# Patient Record
Sex: FEMALE | Race: WHITE | NOT HISPANIC OR LATINO | Employment: OTHER | URBAN - METROPOLITAN AREA
[De-identification: names, ages, dates, MRNs, and addresses within clinical notes are randomized per-mention and may not be internally consistent; named-entity substitution may affect disease eponyms.]

---

## 2017-01-09 ENCOUNTER — OFFICE VISIT (OUTPATIENT)
Dept: CARDIOLOGY | Facility: CLINIC | Age: 77
End: 2017-01-09

## 2017-01-09 VITALS
SYSTOLIC BLOOD PRESSURE: 132 MMHG | DIASTOLIC BLOOD PRESSURE: 74 MMHG | HEART RATE: 65 BPM | WEIGHT: 179 LBS | BODY MASS INDEX: 30.02 KG/M2

## 2017-01-09 DIAGNOSIS — R60.0 LOCALIZED EDEMA: ICD-10-CM

## 2017-01-09 DIAGNOSIS — I10 ESSENTIAL HYPERTENSION: Primary | ICD-10-CM

## 2017-01-09 DIAGNOSIS — F32.89 OTHER DEPRESSION: ICD-10-CM

## 2017-01-09 DIAGNOSIS — R06.02 SHORTNESS OF BREATH ON EXERTION: ICD-10-CM

## 2017-01-09 PROCEDURE — 99204 OFFICE O/P NEW MOD 45 MIN: CPT | Performed by: INTERNAL MEDICINE

## 2017-01-09 PROCEDURE — 93000 ELECTROCARDIOGRAM COMPLETE: CPT | Performed by: INTERNAL MEDICINE

## 2017-01-09 RX ORDER — ASPIRIN 81 MG/1
81 TABLET ORAL DAILY
COMMUNITY

## 2017-01-09 NOTE — PROGRESS NOTES
[facility]  Cardiology Consult Note  .  Patient Identification:  Name: Estela Joshua  Age: 76 y.o.  Sex: female  :  1940  MRN: [id number]       2017      Requesting Physician:Dr Sheldon  Reason for Consultation: shortness of breath      Chief Complaint   Patient presents with   • Hypertension   • Shortness of Breath     HPI     She has known CAD with occluded RCA [facility] with left to right collaterals. Normal EF in the past    Now she has progressively worsening dyspnea on exertion for the last 6 months. Walking less than a block makes her short of breath. No pnd or orthopnea. No edema of the legs.  Occasionally she gets chest tightness on walking and going up/down steps. The tightess doesn't last long. No heart palpitations or syncope.    She does get bronchitis and quit smoking 30 years ago.    Past Medical History:  Past Medical History   Diagnosis Date   • Depression    • Glaucoma    • Hyperglycemia    • Hyperlipidemia    • Hypertension    • Osteoarthritis      Past Surgical History:  Past Surgical History   Procedure Laterality Date   • Appendectomy     • Cardiac surgery     • Laparoscopic tubal ligation     • Cataract extraction        Home Meds:    (Not in a hospital admission)  Current Meds:     Current Outpatient Prescriptions:   •  amLODIPine (NORVASC) 5 MG tablet, TAKE 1 TABLET BY MOUTH TWICE DAILY, Disp: 180 tablet, Rfl: 0  •  aspirin 81 MG EC tablet, Take 81 mg by mouth Daily., Disp: , Rfl:   •  atorvastatin (LIPITOR) 40 MG tablet, TAKE 1 TABLET BY MOUTH EVERY DAY, Disp: 90 tablet, Rfl: 0  •  BESIVANCE 0.6 % suspension ophthalmic suspension, SHAKE LQ AND INT 1 GTT IN OD BID, Disp: , Rfl: 1  •  diclofenac (VOLTAREN) 75 MG EC tablet, TAKE 1 TABLET BY MOUTH TWICE DAILY WITH FOOD, Disp: 180 tablet, Rfl: 0  •  fluticasone (FLONASE) 50 MCG/ACT nasal spray, into each nostril., Disp: , Rfl:   •  FLUZONE HIGH-DOSE 0.5 ML suspension prefilled syringe  injection, ADM 0.5ML IM UTD, Disp: , Rfl: 0  •  furosemide (LASIX) 20 MG tablet, TAKE 1 TABLET BY MOUTH EVERY MORNING, Disp: 90 tablet, Rfl: 0  •  Glucosamine HCl (GLUCOSAMINE PO), Take  by mouth., Disp: , Rfl:   •  LOTEMAX 0.5 % gel ophthalmic gel, INT 1 DROP IN THE OD BID, Disp: , Rfl: 1  •  pantoprazole (PROTONIX) 40 MG EC tablet, TAKE 1 TABLET BY MOUTH EVERY DAY, Disp: 90 tablet, Rfl: 5  •  PROLENSA 0.07 % solution, INT 1 DROP IN THE OD QD, Disp: , Rfl: 1  •  sertraline (ZOLOFT) 25 MG tablet, TAKE 1 TABLET BY MOUTH EVERY DAY, Disp: 90 tablet, Rfl: 0  •  vitamin D (ERGOCALCIFEROL) 71039 UNITS capsule capsule, Take  by mouth., Disp: , Rfl:   Allergies:  No Known Allergies  Social History:   Social History   Substance Use Topics   • Smoking status: Former Smoker   • Smokeless tobacco: Not on file   • Alcohol use Yes      Family History:  Family History   Problem Relation Age of Onset   • Alzheimer's disease Mother    • Stroke Mother    • Heart failure Father       Review of Systems   Constitution: Positive for malaise/fatigue. Negative for chills and fever.   HENT: Negative for headaches.    Eyes: Negative for blurred vision and double vision.   Cardiovascular: Positive for irregular heartbeat and leg swelling. Negative for chest pain and palpitations.   Respiratory: Positive for shortness of breath and snoring.    Skin: Negative for color change.   Musculoskeletal: Positive for arthritis.   Gastrointestinal: Negative for abdominal pain, nausea and vomiting.   Neurological: Positive for light-headedness. Negative for dizziness and numbness.   Psychiatric/Behavioral: Depression: meds.      Al the other Ros are stable  Objective:  Visit Vitals   • /74   • Pulse 65   • Wt 179 lb (81.2 kg)   • BMI 30.02 kg/m2     Exam:  Physical Exam    General: No acute distress, well developed, well nourished    Skin: Warm and dry, no diaphoresis noted; well hydrated; no rash; no pallor or cyanosis   HEENT: Normal Pupills; no  conjunctiva erythema; external ear and nose normal; oral mucosa moist, no xanthelasma, lips not cyanotic   Neck: Supple; no carotid bruits; no JVP; thyroid not enlarged. Trahea mid line    Heart: S1S2 regular rate and rhythm; no murmurs, no rubs or gallops are auscultated.   Chest: Respirations regular, unlabored at rest, bilateral breath sounds have good air entry throughout all lung fields; no crackles, rubs or wheezes auscultated.     Abdomen: Soft, non-tender, non-distended, positive bowel sounds, no organomegaly   Extremities: Bilateral lower extremities have no pre-tibial pitting edema; Femoral pulses are palpable   Musculoskeletal:  Moves all extremities well; no deformities; no tenderness; no clubbing of the fingers   Neurological/  Psychological:  Alert and oriented x 3; no new  motor deficits; speech and behavior appropriate; normal mood and affect    Rest of the exam is stable       ECG 12 Lead  Date/Time: 1/9/2017 1:06 PM  Performed by: QUIN SALVADOR  Authorized by: QUIN SALVADOR   Previous ECG: no previous ECG available  Rhythm: sinus rhythm  Rate: normal  QRS axis: normal  Comments: Nonspecific ST abnormality            Assessment:    Problem List Items Addressed This Visit        Cardiovascular and Mediastinum    Hypertension - Primary       Respiratory    Shortness of breath on exertion       Other    Depression    Edema          Recommendations:    Overall her shortness of breath appears multifactorial. It could be ischemia equivalent as well. I will get an Echo and a cardiolyte stress test and further recommondations will follow. I will check her BNP        Quin Salvador MD  1/9/2017, 1:06 PM

## 2017-01-09 NOTE — LETTER
2017     Robert Miner MD  [address] [facility] [address]    Patient: Estela Joshua   YOB: 1940   Date of Visit: 2017       Dear Dr. Kristofer MD:    Thank you for referring Estela Joshua to me for evaluation. Below are the relevant portions of my assessment and plan of care.    If you have questions, please do not hesitate to call me. I look forward to following Estela along with you.         Sincerely,        Pramod Salvador MD        CC: No Recipients  Pramod Salvador MD  2017  1:09 PM  Sign at close encounter  [facility]  Cardiology Consult Note  .  Patient Identification:  Name: Estela Joshua  Age: 76 y.o.  Sex: female  :  1940  MRN: [id number]       2017      Requesting Physician:Dr Sheldon  Reason for Consultation: shortness of breath      Chief Complaint   Patient presents with   • Hypertension   • Shortness of Breath     HPI     She has known CAD with occluded RCA per cath report in  at [facility] with left to right collaterals. Normal EF in the past    Now she has progressively worsening dyspnea on exertion for the last 6 months. Walking less than a block makes her short of breath. No pnd or orthopnea. No edema of the legs.  Occasionally she gets chest tightness on walking and going up/down steps. The tightess doesn't last long. No heart palpitations or syncope.    She does get bronchitis and quit smoking 30 years ago.    Past Medical History:  Past Medical History   Diagnosis Date   • Depression    • Glaucoma    • Hyperglycemia    • Hyperlipidemia    • Hypertension    • Osteoarthritis      Past Surgical History:  Past Surgical History   Procedure Laterality Date   • Appendectomy     • Cardiac surgery     • Laparoscopic tubal ligation     • Cataract extraction        Home Meds:    (Not in a hospital admission)  Current Meds:     Current Outpatient Prescriptions:   •  amLODIPine (NORVASC) 5 MG tablet, TAKE 1 TABLET BY MOUTH  TWICE DAILY, Disp: 180 tablet, Rfl: 0  •  aspirin 81 MG EC tablet, Take 81 mg by mouth Daily., Disp: , Rfl:   •  atorvastatin (LIPITOR) 40 MG tablet, TAKE 1 TABLET BY MOUTH EVERY DAY, Disp: 90 tablet, Rfl: 0  •  BESIVANCE 0.6 % suspension ophthalmic suspension, SHAKE LQ AND INT 1 GTT IN OD BID, Disp: , Rfl: 1  •  diclofenac (VOLTAREN) 75 MG EC tablet, TAKE 1 TABLET BY MOUTH TWICE DAILY WITH FOOD, Disp: 180 tablet, Rfl: 0  •  fluticasone (FLONASE) 50 MCG/ACT nasal spray, into each nostril., Disp: , Rfl:   •  FLUZONE HIGH-DOSE 0.5 ML suspension prefilled syringe injection, ADM 0.5ML IM UTD, Disp: , Rfl: 0  •  furosemide (LASIX) 20 MG tablet, TAKE 1 TABLET BY MOUTH EVERY MORNING, Disp: 90 tablet, Rfl: 0  •  Glucosamine HCl (GLUCOSAMINE PO), Take  by mouth., Disp: , Rfl:   •  LOTEMAX 0.5 % gel ophthalmic gel, INT 1 DROP IN THE OD BID, Disp: , Rfl: 1  •  pantoprazole (PROTONIX) 40 MG EC tablet, TAKE 1 TABLET BY MOUTH EVERY DAY, Disp: 90 tablet, Rfl: 5  •  PROLENSA 0.07 % solution, INT 1 DROP IN THE OD QD, Disp: , Rfl: 1  •  sertraline (ZOLOFT) 25 MG tablet, TAKE 1 TABLET BY MOUTH EVERY DAY, Disp: 90 tablet, Rfl: 0  •  vitamin D (ERGOCALCIFEROL) 28075 UNITS capsule capsule, Take  by mouth., Disp: , Rfl:   Allergies:  No Known Allergies  Social History:   Social History   Substance Use Topics   • Smoking status: Former Smoker   • Smokeless tobacco: Not on file   • Alcohol use Yes      Family History:  Family History   Problem Relation Age of Onset   • Alzheimer's disease Mother    • Stroke Mother    • Heart failure Father       Review of Systems   Constitution: Positive for malaise/fatigue. Negative for chills and fever.   HENT: Negative for headaches.    Eyes: Negative for blurred vision and double vision.   Cardiovascular: Positive for irregular heartbeat and leg swelling. Negative for chest pain and palpitations.   Respiratory: Positive for shortness of breath and snoring.    Skin: Negative for color change.    Musculoskeletal: Positive for arthritis.   Gastrointestinal: Negative for abdominal pain, nausea and vomiting.   Neurological: Positive for light-headedness. Negative for dizziness and numbness.   Psychiatric/Behavioral: Depression: meds.      Al the other Ros are stable  Objective:  Visit Vitals   • /74   • Pulse 65   • Wt 179 lb (81.2 kg)   • BMI 30.02 kg/m2     Exam:  Physical Exam    General: No acute distress, well developed, well nourished    Skin: Warm and dry, no diaphoresis noted; well hydrated; no rash; no pallor or cyanosis   HEENT: Normal Pupills; no conjunctiva erythema; external ear and nose normal; oral mucosa moist, no xanthelasma, lips not cyanotic   Neck: Supple; no carotid bruits; no JVP; thyroid not enlarged. Trahea mid line    Heart: S1S2 regular rate and rhythm; no murmurs, no rubs or gallops are auscultated.   Chest: Respirations regular, unlabored at rest, bilateral breath sounds have good air entry throughout all lung fields; no crackles, rubs or wheezes auscultated.     Abdomen: Soft, non-tender, non-distended, positive bowel sounds, no organomegaly   Extremities: Bilateral lower extremities have no pre-tibial pitting edema; Femoral pulses are palpable   Musculoskeletal:  Moves all extremities well; no deformities; no tenderness; no clubbing of the fingers   Neurological/  Psychological:  Alert and oriented x 3; no new  motor deficits; speech and behavior appropriate; normal mood and affect    Rest of the exam is stable       ECG 12 Lead  Date/Time: 1/9/2017 1:06 PM  Performed by: QUIN DA SILVA  Authorized by: QUIN DA SILVA   Previous ECG: no previous ECG available  Rhythm: sinus rhythm  Rate: normal  QRS axis: normal  Comments: Nonspecific ST abnormality            Assessment:    Problem List Items Addressed This Visit        Cardiovascular and Mediastinum    Hypertension - Primary       Respiratory    Shortness of breath on exertion       Other    Depression     Edema          Recommendations:    Overall her shortness of breath appears multifactorial. It could be ischemia equivalent as well. I will get an Echo and a cardiolyte stress test and further recommondations will follow. I will check her BNP        Pramod Salvador MD  1/9/2017, 1:06 PM

## 2017-01-09 NOTE — MR AVS SNAPSHOT
[facility]  [phone]                    Estela Joshua   1/9/2017 12:30 PM   Office Visit    Dept Phone:  [phone]   Encounter #:  [id number]    Provider:  Pramod Salvador MD   Department:  [facility]                Your Full Care Plan              Today's Medication Changes          These changes are accurate as of: 1/9/17  1:13 PM.  If you have any questions, ask your nurse or doctor.               Medication(s)that have changed:     sertraline 25 MG tablet   Commonly known as:  ZOLOFT   TAKE 1 TABLET BY MOUTH EVERY DAY   What changed:  Another medication with the same name was removed. Continue taking this medication, and follow the directions you see here.   Changed by:  Robert Miner MD                  Your Updated Medication List          This list is accurate as of: 1/9/17  1:13 PM.  Always use your most recent med list.                amLODIPine 5 MG tablet   Commonly known as:  NORVASC   TAKE 1 TABLET BY MOUTH TWICE DAILY       aspirin 81 MG EC tablet       atorvastatin 40 MG tablet   Commonly known as:  LIPITOR   TAKE 1 TABLET BY MOUTH EVERY DAY       BESIVANCE 0.6 % suspension ophthalmic suspension   Generic drug:  besifloxacin       diclofenac 75 MG EC tablet   Commonly known as:  VOLTAREN   TAKE 1 TABLET BY MOUTH TWICE DAILY WITH FOOD       fluticasone 50 MCG/ACT nasal spray   Commonly known as:  FLONASE       FLUZONE HIGH-DOSE 0.5 ML suspension prefilled syringe injection   Generic drug:  influenza vac split high-dose       furosemide 20 MG tablet   Commonly known as:  LASIX   TAKE 1 TABLET BY MOUTH EVERY MORNING       GLUCOSAMINE PO       LOTEMAX 0.5 % gel ophthalmic gel   Generic drug:  loteprednol etabonate       pantoprazole 40 MG EC tablet   Commonly known as:  PROTONIX   TAKE 1 TABLET BY MOUTH EVERY DAY       PROLENSA 0.07 % solution   Generic drug:  Bromfenac Sodium       sertraline 25  MG tablet   Commonly known as:  ZOLOFT   TAKE 1 TABLET BY MOUTH EVERY DAY       vitamin D 00686 UNITS capsule capsule   Commonly known as:  ERGOCALCIFEROL               We Performed the Following     ECG 12 Lead     proBNP       You Were Diagnosed With        Codes Comments    Essential hypertension    -  Primary ICD-10-CM: I10  ICD-9-CM: 401.9     Shortness of breath on exertion     ICD-10-CM: R06.02  ICD-9-CM: 786.05     Other depression     ICD-10-CM: F32.89     Localized edema     ICD-10-CM: R60.0  ICD-9-CM: 782.3       Instructions     None    Patient Instructions History      Upcoming Appointments     Visit Type Date Time Department    NEW PATIENT 2017 12:30 PM [facility]    [facility]    FOLLOW UP 2017  9:30 AM [facility]      [employer] Signup     [facility] [employer] allows you to send messages to your doctor, view your test results, renew your prescriptions, schedule appointments, and more. To sign up, go to [employer]aze and click on the Sign Up Now link in the New User? box. Enter your [employer] Activation Code exactly as it appears below along with the last four digits of your Social Security Number and your Date of Birth () to complete the sign-up process. If you do not sign up before the expiration date, you must request a new code.    [employer] Activation Code: [email or web address]  Expires: 2017  1:13 PM    If you have questions, you can email [email or web address] or call [phone] to talk to our [employer] staff. Remember, [employer] is NOT to be used for urgent needs. For medical emergencies, dial 911.               Other Info from Your Visit           Your Appointments     2017  8:30 AM EDT   [facility] with [facility] (--)    [address] [facility] [address]   [phone]            2017  9:30 AM EDT   Follow Up with Robert Miner MD    [facility] (--)    [address] [facility] [address] [phone]           Arrive 15 minutes prior to appointment.              Allergies     No Known Allergies      Reason for Visit     Hypertension     Shortness of Breath           Vital Signs     Blood Pressure Pulse Weight Body Mass Index Smoking Status       132/74 65 179 lb (81.2 kg) 30.02 kg/m2 Former Smoker       Problems and Diagnoses Noted     Depression    Accumulation of fluid in tissues    High blood pressure    Shortness of breath on exertion      Results     ECG 12 Lead

## 2017-01-10 ENCOUNTER — HOSPITAL ENCOUNTER (OUTPATIENT)
Dept: CARDIOLOGY | Facility: HOSPITAL | Age: 77
Discharge: HOME | End: 2017-01-10
Attending: INTERNAL MEDICINE

## 2017-01-10 ENCOUNTER — HOSPITAL ENCOUNTER (OUTPATIENT)
Dept: CARDIOLOGY | Facility: HOSPITAL | Age: 77
Discharge: HOME | End: 2017-01-10
Attending: INTERNAL MEDICINE | Admitting: INTERNAL MEDICINE

## 2017-01-10 ENCOUNTER — HOSPITAL ENCOUNTER (OUTPATIENT)
Dept: CARDIOLOGY | Facility: HOSPITAL | Age: 77
Discharge: HOME | End: 2017-01-10

## 2017-01-10 VITALS — HEART RATE: 56 BPM | DIASTOLIC BLOOD PRESSURE: 68 MMHG | SYSTOLIC BLOOD PRESSURE: 108 MMHG

## 2017-01-10 VITALS — BODY MASS INDEX: 30.56 KG/M2 | HEIGHT: 64 IN | WEIGHT: 179 LBS

## 2017-01-10 DIAGNOSIS — I10 ESSENTIAL HYPERTENSION: ICD-10-CM

## 2017-01-10 DIAGNOSIS — R06.02 SHORTNESS OF BREATH ON EXERTION: ICD-10-CM

## 2017-01-10 DIAGNOSIS — F32.89 OTHER DEPRESSION: ICD-10-CM

## 2017-01-10 DIAGNOSIS — R60.0 LOCALIZED EDEMA: ICD-10-CM

## 2017-01-10 LAB — NT-PROBNP SERPL-MCNC: 159.1 PG/ML (ref 0–1800)

## 2017-01-10 PROCEDURE — 93018 CV STRESS TEST I&R ONLY: CPT | Performed by: INTERNAL MEDICINE

## 2017-01-10 PROCEDURE — 93016 CV STRESS TEST SUPVJ ONLY: CPT | Performed by: INTERNAL MEDICINE

## 2017-01-10 PROCEDURE — 78452 HT MUSCLE IMAGE SPECT MULT: CPT

## 2017-01-10 PROCEDURE — 0399T HC MYOCARDL STRAIN IMAG QUAN ASSMT PER SESS: CPT

## 2017-01-10 PROCEDURE — 25010000002 REGADENOSON 0.4 MG/5ML SOLUTION: Performed by: INTERNAL MEDICINE

## 2017-01-10 PROCEDURE — 93306 TTE W/DOPPLER COMPLETE: CPT

## 2017-01-10 PROCEDURE — A9500 TC99M SESTAMIBI: HCPCS | Performed by: INTERNAL MEDICINE

## 2017-01-10 PROCEDURE — 93306 TTE W/DOPPLER COMPLETE: CPT | Performed by: INTERNAL MEDICINE

## 2017-01-10 PROCEDURE — 36415 COLL VENOUS BLD VENIPUNCTURE: CPT | Performed by: INTERNAL MEDICINE

## 2017-01-10 PROCEDURE — 0 TECHNETIUM SESTAMIBI: Performed by: INTERNAL MEDICINE

## 2017-01-10 PROCEDURE — 78452 HT MUSCLE IMAGE SPECT MULT: CPT | Performed by: INTERNAL MEDICINE

## 2017-01-10 PROCEDURE — 93017 CV STRESS TEST TRACING ONLY: CPT

## 2017-01-10 PROCEDURE — 83880 ASSAY OF NATRIURETIC PEPTIDE: CPT | Performed by: INTERNAL MEDICINE

## 2017-01-10 RX ADMIN — Medication 1 DOSE: at 08:50

## 2017-01-10 RX ADMIN — Medication 1 DOSE: at 10:47

## 2017-01-10 RX ADMIN — REGADENOSON 0.4 MG: 0.08 INJECTION, SOLUTION INTRAVENOUS at 10:48

## 2017-01-11 LAB
BH CV STRESS BP STAGE 1: NORMAL
BH CV STRESS DURATION MIN STAGE 1: 3
BH CV STRESS DURATION SEC STAGE 1: 0
BH CV STRESS GRADE STAGE 1: 10
BH CV STRESS HR STAGE 1: 111
BH CV STRESS METS STAGE 1: 4.6
BH CV STRESS PROTOCOL 1: NORMAL
BH CV STRESS PROTOCOL 2 COMMENTS STAGE 1: NORMAL
BH CV STRESS PROTOCOL 2 DOSE REGADENOSON STAGE 1: 0.4
BH CV STRESS PROTOCOL 2 DURATION MIN STAGE 1: 0
BH CV STRESS PROTOCOL 2 DURATION SEC STAGE 1: 15
BH CV STRESS PROTOCOL 2 HR STAGE 1: 114
BH CV STRESS PROTOCOL 2 STAGE 1: 1
BH CV STRESS PROTOCOL 2: NORMAL
BH CV STRESS RECOVERY BP: NORMAL MMHG
BH CV STRESS RECOVERY HR: 76 BPM
BH CV STRESS SPEED STAGE 1: 1.7
BH CV STRESS STAGE 1: 1
LV EF NUC BP: 77 %
MAXIMAL PREDICTED HEART RATE: 144 BPM
PERCENT MAX PREDICTED HR: 79.17 %
STRESS BASELINE BP: NORMAL MMHG
STRESS BASELINE HR: 60 BPM
STRESS PERCENT HR: 93 %
STRESS POST ESTIMATED WORKLOAD: 5 METS
STRESS POST EXERCISE DUR MIN: 6 MIN
STRESS POST EXERCISE DUR SEC: 0 SEC
STRESS POST PEAK BP: NORMAL MMHG
STRESS POST PEAK HR: 114 BPM
STRESS TARGET HR: 122 BPM

## 2017-01-12 ENCOUNTER — TELEPHONE (OUTPATIENT)
Dept: CARDIOLOGY | Facility: HOSPITAL | Age: 77
End: 2017-01-12

## 2017-01-12 NOTE — TELEPHONE ENCOUNTER
Left VM asking patient to call back regarding abnormal ECG on Cardiolite stress test per Dr. Salvador and to keep FU OV scheduled for 1/24/17 at 3:00.

## 2017-01-16 LAB
BH CV ECHO MEAS - ACS: 2 CM
BH CV ECHO MEAS - AI DEC SLOPE: 158 CM/SEC^2
BH CV ECHO MEAS - AI MAX PG: 37.5 MMHG
BH CV ECHO MEAS - AI MAX VEL: 306 CM/SEC
BH CV ECHO MEAS - AI P1/2T: 567.2 MSEC
BH CV ECHO MEAS - AO MAX PG (FULL): 8.2 MMHG
BH CV ECHO MEAS - AO MAX PG: 14.4 MMHG
BH CV ECHO MEAS - AO MEAN PG (FULL): 4 MMHG
BH CV ECHO MEAS - AO MEAN PG: 7 MMHG
BH CV ECHO MEAS - AO ROOT AREA (BSA CORRECTED): 1.6
BH CV ECHO MEAS - AO ROOT AREA: 7.1 CM^2
BH CV ECHO MEAS - AO ROOT DIAM: 3 CM
BH CV ECHO MEAS - AO V2 MAX: 190 CM/SEC
BH CV ECHO MEAS - AO V2 MEAN: 123 CM/SEC
BH CV ECHO MEAS - AO V2 VTI: 41.9 CM
BH CV ECHO MEAS - AVA(I,A): 2.6 CM^2
BH CV ECHO MEAS - AVA(I,D): 2.6 CM^2
BH CV ECHO MEAS - AVA(V,A): 2.3 CM^2
BH CV ECHO MEAS - AVA(V,D): 2.3 CM^2
BH CV ECHO MEAS - BSA(HAYCOCK): 1.9 M^2
BH CV ECHO MEAS - BSA: 1.9 M^2
BH CV ECHO MEAS - BZI_BMI: 30.7 KILOGRAMS/M^2
BH CV ECHO MEAS - BZI_METRIC_HEIGHT: 162.6 CM
BH CV ECHO MEAS - BZI_METRIC_WEIGHT: 81.2 KG
BH CV ECHO MEAS - CONTRAST EF 4CH: 69.9 ML/M^2
BH CV ECHO MEAS - EDV(CUBED): 91.1 ML
BH CV ECHO MEAS - EDV(MOD-SP4): 73 ML
BH CV ECHO MEAS - EDV(TEICH): 92.4 ML
BH CV ECHO MEAS - EF(CUBED): 60.6 %
BH CV ECHO MEAS - EF(MOD-SP4): 69.9 %
BH CV ECHO MEAS - EF(TEICH): 52.3 %
BH CV ECHO MEAS - ESV(CUBED): 35.9 ML
BH CV ECHO MEAS - ESV(MOD-SP4): 22 ML
BH CV ECHO MEAS - ESV(TEICH): 44.1 ML
BH CV ECHO MEAS - FS: 26.7 %
BH CV ECHO MEAS - IVS/LVPW: 0.82
BH CV ECHO MEAS - IVSD: 0.9 CM
BH CV ECHO MEAS - LA DIMENSION: 4.1 CM
BH CV ECHO MEAS - LA/AO: 1.4
BH CV ECHO MEAS - LAT PEAK E' VEL: 7.7 CM/SEC
BH CV ECHO MEAS - LV DIASTOLIC VOL/BSA (35-75): 39.1 ML/M^2
BH CV ECHO MEAS - LV MASS(C)D: 153.3 GRAMS
BH CV ECHO MEAS - LV MASS(C)DI: 82.1 GRAMS/M^2
BH CV ECHO MEAS - LV MAX PG: 6.3 MMHG
BH CV ECHO MEAS - LV MEAN PG: 3 MMHG
BH CV ECHO MEAS - LV SYSTOLIC VOL/BSA (12-30): 11.8 ML/M^2
BH CV ECHO MEAS - LV V1 MAX: 125 CM/SEC
BH CV ECHO MEAS - LV V1 MEAN: 88.3 CM/SEC
BH CV ECHO MEAS - LV V1 VTI: 31.3 CM
BH CV ECHO MEAS - LVIDD: 4.5 CM
BH CV ECHO MEAS - LVIDS: 3.3 CM
BH CV ECHO MEAS - LVLD AP4: 6.7 CM
BH CV ECHO MEAS - LVLS AP4: 5.2 CM
BH CV ECHO MEAS - LVOT AREA (M): 3.5 CM^2
BH CV ECHO MEAS - LVOT AREA: 3.5 CM^2
BH CV ECHO MEAS - LVOT DIAM: 2.1 CM
BH CV ECHO MEAS - LVPWD: 1.1 CM
BH CV ECHO MEAS - MED PEAK E' VEL: 7.8 CM/SEC
BH CV ECHO MEAS - MR MAX PG: 84.6 MMHG
BH CV ECHO MEAS - MR MAX VEL: 460 CM/SEC
BH CV ECHO MEAS - MV A DUR: 0.19 SEC
BH CV ECHO MEAS - MV A MAX VEL: 126 CM/SEC
BH CV ECHO MEAS - MV DEC SLOPE: 484 CM/SEC^2
BH CV ECHO MEAS - MV DEC TIME: 0.23 SEC
BH CV ECHO MEAS - MV E MAX VEL: 126 CM/SEC
BH CV ECHO MEAS - MV E/A: 1
BH CV ECHO MEAS - MV MAX PG: 6.4 MMHG
BH CV ECHO MEAS - MV MEAN PG: 2 MMHG
BH CV ECHO MEAS - MV P1/2T MAX VEL: 123 CM/SEC
BH CV ECHO MEAS - MV P1/2T: 74.4 MSEC
BH CV ECHO MEAS - MV V2 MAX: 126 CM/SEC
BH CV ECHO MEAS - MV V2 MEAN: 66.7 CM/SEC
BH CV ECHO MEAS - MV V2 VTI: 40.7 CM
BH CV ECHO MEAS - MVA P1/2T LCG: 1.8 CM^2
BH CV ECHO MEAS - MVA(P1/2T): 3 CM^2
BH CV ECHO MEAS - MVA(VTI): 2.7 CM^2
BH CV ECHO MEAS - PA MAX PG (FULL): 3 MMHG
BH CV ECHO MEAS - PA MAX PG: 3.8 MMHG
BH CV ECHO MEAS - PA V2 MAX: 96.9 CM/SEC
BH CV ECHO MEAS - PULM A REVS DUR: 0.16 SEC
BH CV ECHO MEAS - PULM A REVS VEL: 35.9 CM/SEC
BH CV ECHO MEAS - PULM DIAS VEL: 55.3 CM/SEC
BH CV ECHO MEAS - PULM S/D: 1.2
BH CV ECHO MEAS - PULM SYS VEL: 64 CM/SEC
BH CV ECHO MEAS - PVA(V,A): 2.2 CM^2
BH CV ECHO MEAS - PVA(V,D): 2.2 CM^2
BH CV ECHO MEAS - QP/QS: 0.4
BH CV ECHO MEAS - RAP SYSTOLE: 10 MMHG
BH CV ECHO MEAS - RV MAX PG: 0.78 MMHG
BH CV ECHO MEAS - RV MEAN PG: 0 MMHG
BH CV ECHO MEAS - RV V1 MAX: 44.3 CM/SEC
BH CV ECHO MEAS - RV V1 MEAN: 31.5 CM/SEC
BH CV ECHO MEAS - RV V1 VTI: 8.8 CM
BH CV ECHO MEAS - RVDD: 2.7 CM
BH CV ECHO MEAS - RVOT AREA: 4.9 CM^2
BH CV ECHO MEAS - RVOT DIAM: 2.5 CM
BH CV ECHO MEAS - RVSP: 39.6 MMHG
BH CV ECHO MEAS - SI(AO): 158.7 ML/M^2
BH CV ECHO MEAS - SI(CUBED): 29.6 ML/M^2
BH CV ECHO MEAS - SI(LVOT): 58.1 ML/M^2
BH CV ECHO MEAS - SI(MOD-SP4): 27.3 ML/M^2
BH CV ECHO MEAS - SI(TEICH): 25.9 ML/M^2
BH CV ECHO MEAS - SV(AO): 296.2 ML
BH CV ECHO MEAS - SV(CUBED): 55.2 ML
BH CV ECHO MEAS - SV(LVOT): 108.4 ML
BH CV ECHO MEAS - SV(MOD-SP4): 51 ML
BH CV ECHO MEAS - SV(RVOT): 43.2 ML
BH CV ECHO MEAS - SV(TEICH): 48.3 ML
BH CV ECHO MEAS - TAPSE (>1.6): 2 CM2
BH CV ECHO MEAS - TR MAX VEL: 272 CM/SEC
BH CV XLRA - RV BASE: 3.5 CM
BH CV XLRA - RV LENGTH: 5.9 CM
BH CV XLRA - RV MID: 2 CM
BH CV XLRA - TDI S': 12 CM/SEC

## 2017-01-16 RX ORDER — ERGOCALCIFEROL 1.25 MG/1
CAPSULE ORAL
Qty: 12 CAPSULE | Refills: 0 | Status: ON HOLD | OUTPATIENT
Start: 2017-01-16 | End: 2017-01-31

## 2017-01-24 ENCOUNTER — OFFICE VISIT (OUTPATIENT)
Dept: CARDIOLOGY | Facility: CLINIC | Age: 77
End: 2017-01-24

## 2017-01-24 VITALS
WEIGHT: 180 LBS | HEART RATE: 58 BPM | SYSTOLIC BLOOD PRESSURE: 117 MMHG | BODY MASS INDEX: 30.9 KG/M2 | DIASTOLIC BLOOD PRESSURE: 70 MMHG

## 2017-01-24 DIAGNOSIS — R73.01 IMPAIRED FASTING GLUCOSE: ICD-10-CM

## 2017-01-24 DIAGNOSIS — F32.89 OTHER DEPRESSION: ICD-10-CM

## 2017-01-24 DIAGNOSIS — I25.10 CAD IN NATIVE ARTERY: ICD-10-CM

## 2017-01-24 DIAGNOSIS — I10 ESSENTIAL HYPERTENSION: Primary | ICD-10-CM

## 2017-01-24 DIAGNOSIS — R06.02 SHORTNESS OF BREATH ON EXERTION: ICD-10-CM

## 2017-01-24 PROCEDURE — 93000 ELECTROCARDIOGRAM COMPLETE: CPT | Performed by: INTERNAL MEDICINE

## 2017-01-24 PROCEDURE — 99214 OFFICE O/P EST MOD 30 MIN: CPT | Performed by: INTERNAL MEDICINE

## 2017-01-24 NOTE — PROGRESS NOTES
Procedure   [facility]  Cardiology Progress Note    Patient Identification:  Name:Estela Joshua  Age:76 y.o.  Sex: female  :  1940  MRN: [id number]           2017    Subjective:    Chief Complaint   Patient presents with   • Hypertension   CAD    HPI    She has known CAD with occluded RCA per cath report in  at [facility] with left to right collaterals. Normal EF in the past     Now she has progressively worsening dyspnea on exertion for the last 6 months. Walking less than a block makes her short of breath. No pnd or orthopnea. No edema of the legs. Occasionally she gets chest tightness on walking and going up/down steps. The tightess doesn't last long. No heart palpitations or syncope.     She does get bronchitis and quit smoking 30 years ago.    She did have an Echo done that showed normal LVEF.  Her cardiolyte stress test did not show ischemia but she got very short of breath with mild exertion on the treadmill. She still c/o class 3 dyspnea with occasional chest tightness        Review of Systems   Constitution: Positive for malaise/fatigue. Negative for chills and fever.   HENT: Negative for headaches.    Eyes: Negative for blurred vision and double vision.   Cardiovascular: Positive for chest pain and leg swelling. Negative for irregular heartbeat and palpitations.   Respiratory: Positive for shortness of breath and snoring.    Skin: Negative for color change.   Musculoskeletal: Positive for arthritis and joint pain.   Gastrointestinal: Negative for abdominal pain, nausea and vomiting.   Neurological: Positive for light-headedness. Negative for dizziness and numbness.   Psychiatric/Behavioral: Positive for depression.       Past Medical History   Diagnosis Date   • Depression    • Glaucoma    • Hyperglycemia    • Hyperlipidemia    • Hypertension    • Osteoarthritis        Past Surgical History   Procedure Laterality Date   • Appendectomy     • Cardiac surgery     • Laparoscopic  tubal ligation     • Cataract extraction         Social History     Social History   • Marital status:      Spouse name: N/A   • Number of children: N/A   • Years of education: N/A     Occupational History   • Not on file.     Social History Main Topics   • Smoking status: Former Smoker   • Smokeless tobacco: Not on file   • Alcohol use Yes   • Drug use: Not on file   • Sexual activity: Not on file     Other Topics Concern   • Not on file     Social History Narrative       Family History   Problem Relation Age of Onset   • Alzheimer's disease Mother    • Stroke Mother    • Heart failure Father        Scheduled Meds:    Current Outpatient Prescriptions:   •  amLODIPine (NORVASC) 5 MG tablet, TAKE 1 TABLET BY MOUTH TWICE DAILY, Disp: 180 tablet, Rfl: 0  •  aspirin 81 MG EC tablet, Take 81 mg by mouth Daily., Disp: , Rfl:   •  atorvastatin (LIPITOR) 40 MG tablet, TAKE 1 TABLET BY MOUTH EVERY DAY, Disp: 90 tablet, Rfl: 0  •  BESIVANCE 0.6 % suspension ophthalmic suspension, SHAKE LQ AND INT 1 GTT IN OD BID, Disp: , Rfl: 1  •  diclofenac (VOLTAREN) 75 MG EC tablet, TAKE 1 TABLET BY MOUTH TWICE DAILY WITH FOOD, Disp: 180 tablet, Rfl: 0  •  fluticasone (FLONASE) 50 MCG/ACT nasal spray, into each nostril., Disp: , Rfl:   •  FLUZONE HIGH-DOSE 0.5 ML suspension prefilled syringe injection, ADM 0.5ML IM UTD, Disp: , Rfl: 0  •  furosemide (LASIX) 20 MG tablet, TAKE 1 TABLET BY MOUTH EVERY MORNING, Disp: 90 tablet, Rfl: 0  •  Glucosamine HCl (GLUCOSAMINE PO), Take  by mouth., Disp: , Rfl:   •  LOTEMAX 0.5 % gel ophthalmic gel, INT 1 DROP IN THE OD BID, Disp: , Rfl: 1  •  pantoprazole (PROTONIX) 40 MG EC tablet, TAKE 1 TABLET BY MOUTH EVERY DAY, Disp: 90 tablet, Rfl: 5  •  PROLENSA 0.07 % solution, INT 1 DROP IN THE OD QD, Disp: , Rfl: 1  •  sertraline (ZOLOFT) 25 MG tablet, TAKE 1 TABLET BY MOUTH EVERY DAY, Disp: 90 tablet, Rfl: 0  •  vitamin D (ERGOCALCIFEROL) 20136 UNITS capsule capsule, TAKE 1 CAPSULE BY MOUTH EVERY  WEEK, Disp: 12 capsule, Rfl: 0    Objective:  Visit Vitals   • /70   • Pulse 58   • Wt 180 lb (81.6 kg)   • BMI 30.9 kg/m2        Physical Exam  Physical Exam:    General: No acute distress.    Skin: Warm and dry, no diaphoresis noted   HEENT: No ptosis; external ear and nose normal; oral mucosa moist   Neck: Supple; no carotid bruits; no JVD, Trachea mid line   Heart: S1S2 regular rate and rhythm; no murmurs; no gallop or rub appreciated, apex not displaced   Chest: Respirations regular, unlabored at rest, bilateral breath sounds have good air entry; no  wheezes auscultated.     Abdomen: Soft, non-tender, non-distended, positive bowel sounds  No hepatosplenomegaly   Extremities: Bilateral lower extremities have no pre-tibial pitting edema; Radials are palpable   Neurological: Alert and oriented x 3; no new motor deficits,           ECG 12 Lead  Date/Time: 1/24/2017 4:16 PM  Performed by: QUIN SALVADOR  Authorized by: QUIN SALVADOR   Comparison: compared with previous ECG   Similar to previous ECG  Rhythm: sinus rhythm  Rate: normal  QRS axis: left               Assessment:  Problem List Items Addressed This Visit        Cardiovascular and Mediastinum    Hypertension - Primary       Respiratory    Shortness of breath on exertion       Endocrine    Impaired fasting glucose       Other    Depression          Plan:    Overall she is still complaining of significant shortness of breath on exertion and occasional dyspnea. Her occluded RCA may be causing these symptoms, but she is known to have it occluded for 4 years and I don't know why she is having symptoms worsening in the last 6 months    I will obtain right and left heart cath to further evaluate her marked dyspnea on exertion.        01/24/2017  Ricardo Salvador MD, FACC

## 2017-01-24 NOTE — MR AVS SNAPSHOT
Estela Joshua   1/24/2017 3:00 PM   Office Visit    Dept Phone:  [phone]   Encounter #:  [id number]    Provider:  Pramod Salvador MD   Department:  [facility]                Your Full Care Plan              Your Updated Medication List          This list is accurate as of: 1/24/17  4:25 PM.  Always use your most recent med list.                amLODIPine 5 MG tablet   Commonly known as:  NORVASC   TAKE 1 TABLET BY MOUTH TWICE DAILY       aspirin 81 MG EC tablet       atorvastatin 40 MG tablet   Commonly known as:  LIPITOR   TAKE 1 TABLET BY MOUTH EVERY DAY       BESIVANCE 0.6 % suspension ophthalmic suspension   Generic drug:  besifloxacin       diclofenac 75 MG EC tablet   Commonly known as:  VOLTAREN   TAKE 1 TABLET BY MOUTH TWICE DAILY WITH FOOD       fluticasone 50 MCG/ACT nasal spray   Commonly known as:  FLONASE       FLUZONE HIGH-DOSE 0.5 ML suspension prefilled syringe injection   Generic drug:  influenza vac split high-dose       furosemide 20 MG tablet   Commonly known as:  LASIX   TAKE 1 TABLET BY MOUTH EVERY MORNING       GLUCOSAMINE PO       LOTEMAX 0.5 % gel ophthalmic gel   Generic drug:  loteprednol etabonate       pantoprazole 40 MG EC tablet   Commonly known as:  PROTONIX   TAKE 1 TABLET BY MOUTH EVERY DAY       PROLENSA 0.07 % solution   Generic drug:  Bromfenac Sodium       sertraline 25 MG tablet   Commonly known as:  ZOLOFT   TAKE 1 TABLET BY MOUTH EVERY DAY       vitamin D 56734 UNITS capsule capsule   Commonly known as:  ERGOCALCIFEROL   TAKE 1 CAPSULE BY MOUTH EVERY WEEK               We Performed the Following     Case Request Cath Lab: Right and Left Heart Cath     ECG 12 Lead       You Were Diagnosed With        Codes Comments    Essential hypertension    -  Primary ICD-10-CM: I10  ICD-9-CM: 401.9     Shortness of breath on exertion     ICD-10-CM: R06.02  ICD-9-CM: 786.05     Impaired fasting glucose      ICD-10-CM: R73.01  ICD-9-CM: 790.21     Other depression     ICD-10-CM: F32.89     CAD in native artery     ICD-10-CM: I25.10  ICD-9-CM: 414.01       Instructions     None    Patient Instructions History      Upcoming Appointments     Visit Type Date Time Department    OFFICE VISIT 1/24/2017  3:00 PM [facility]    [facility] 6/20/2017  8:30 AM [facility]    [facility]      MyChart Signup     Our records indicate that you have an active [employer] account.    You can view your After Visit Summary by going to [employer] and logging in with your [employer] username and password.  If you don't have a [employer] username and password but a parent or guardian has access to your record, the parent or guardian should login with their own [employer] username and password and access your record to view the After Visit Summary.    If you have questions, you can email [email or web address] or call [phone] to talk to our [employer] staff.  Remember, [employer] is NOT to be used for urgent needs.  For medical emergencies, dial 911.               Other Info from Your Visit           Your Appointments     Jun 20, 2017  8:30 AM EDT   [facility] (--)    [address] [facility] [address] [facility] [id number] [phone]            Jun 27, 2017  9:30 AM EDT   Follow Up with Robert Miner MD   [facility] (--)    [address] [address] [facility] [id number] [phone]           Arrive 15 minutes prior to appointment.              Allergies     No Known Allergies      Reason for Visit     Hypertension           Vital Signs     Blood Pressure Pulse Weight Body Mass Index Smoking Status       117/70 58 180 lb (81.6 kg) 30.9 kg/m2 Former Smoker       Problems and Diagnoses Noted     Depression    High blood pressure    Increased fasting blood sugar    Shortness  of breath on exertion    Heart disease due to blocked artery          Results     ECG 12 Lead

## 2017-01-24 NOTE — LETTER
2017     Robert Miner MD  [address] [address] [address]    Patient: Estela Joshua   YOB: 1940   Date of Visit: 2017       Dear Dr. Kristofer MD:    Thank you for referring Estela Joshua to me for evaluation. Below are the relevant portions of my assessment and plan of care.    If you have questions, please do not hesitate to call me. I look forward to following Estela along with you.         Sincerely,        Pramod Salvador MD        CC: No Recipients  Pramod Salvador MD  2017  4:21 PM  Sign at close encounter  Procedure   [facility]  Cardiology Progress Note    Patient Identification:  Name:Estela Joshua  Age:76 y.o.  Sex: female  :  1940  MRN: [id number]           2017    Subjective:    Chief Complaint   Patient presents with   • Hypertension   CAD    HPI    She has known CAD with occluded RCA per cath report in  at [facility] with left to right collaterals. Normal EF in the past     Now she has progressively worsening dyspnea on exertion for the last 6 months. Walking less than a block makes her short of breath. No pnd or orthopnea. No edema of the legs. Occasionally she gets chest tightness on walking and going up/down steps. The tightess doesn't last long. No heart palpitations or syncope.     She does get bronchitis and quit smoking 30 years ago.    She did have an Echo done that showed normal LVEF.  Her cardiolyte stress test did not show ischemia but she got very short of breath with mild exertion on the treadmill. She still c/o class 3 dyspnea with occasional chest tightness        Review of Systems   Constitution: Positive for malaise/fatigue. Negative for chills and fever.   HENT: Negative for headaches.    Eyes: Negative for blurred vision and double vision.   Cardiovascular: Positive for chest pain and leg swelling. Negative for irregular heartbeat and palpitations.   Respiratory: Positive for shortness of breath  and snoring.    Skin: Negative for color change.   Musculoskeletal: Positive for arthritis and joint pain.   Gastrointestinal: Negative for abdominal pain, nausea and vomiting.   Neurological: Positive for light-headedness. Negative for dizziness and numbness.   Psychiatric/Behavioral: Positive for depression.       Past Medical History   Diagnosis Date   • Depression    • Glaucoma    • Hyperglycemia    • Hyperlipidemia    • Hypertension    • Osteoarthritis        Past Surgical History   Procedure Laterality Date   • Appendectomy     • Cardiac surgery     • Laparoscopic tubal ligation     • Cataract extraction         Social History     Social History   • Marital status:      Spouse name: N/A   • Number of children: N/A   • Years of education: N/A     Occupational History   • Not on file.     Social History Main Topics   • Smoking status: Former Smoker   • Smokeless tobacco: Not on file   • Alcohol use Yes   • Drug use: Not on file   • Sexual activity: Not on file     Other Topics Concern   • Not on file     Social History Narrative       Family History   Problem Relation Age of Onset   • Alzheimer's disease Mother    • Stroke Mother    • Heart failure Father        Scheduled Meds:    Current Outpatient Prescriptions:   •  amLODIPine (NORVASC) 5 MG tablet, TAKE 1 TABLET BY MOUTH TWICE DAILY, Disp: 180 tablet, Rfl: 0  •  aspirin 81 MG EC tablet, Take 81 mg by mouth Daily., Disp: , Rfl:   •  atorvastatin (LIPITOR) 40 MG tablet, TAKE 1 TABLET BY MOUTH EVERY DAY, Disp: 90 tablet, Rfl: 0  •  BESIVANCE 0.6 % suspension ophthalmic suspension, SHAKE LQ AND INT 1 GTT IN OD BID, Disp: , Rfl: 1  •  diclofenac (VOLTAREN) 75 MG EC tablet, TAKE 1 TABLET BY MOUTH TWICE DAILY WITH FOOD, Disp: 180 tablet, Rfl: 0  •  fluticasone (FLONASE) 50 MCG/ACT nasal spray, into each nostril., Disp: , Rfl:   •  FLUZONE HIGH-DOSE 0.5 ML suspension prefilled syringe injection, ADM 0.5ML IM UTD, Disp: , Rfl: 0  •  furosemide (LASIX) 20 MG  tablet, TAKE 1 TABLET BY MOUTH EVERY MORNING, Disp: 90 tablet, Rfl: 0  •  Glucosamine HCl (GLUCOSAMINE PO), Take  by mouth., Disp: , Rfl:   •  LOTEMAX 0.5 % gel ophthalmic gel, INT 1 DROP IN THE OD BID, Disp: , Rfl: 1  •  pantoprazole (PROTONIX) 40 MG EC tablet, TAKE 1 TABLET BY MOUTH EVERY DAY, Disp: 90 tablet, Rfl: 5  •  PROLENSA 0.07 % solution, INT 1 DROP IN THE OD QD, Disp: , Rfl: 1  •  sertraline (ZOLOFT) 25 MG tablet, TAKE 1 TABLET BY MOUTH EVERY DAY, Disp: 90 tablet, Rfl: 0  •  vitamin D (ERGOCALCIFEROL) 60006 UNITS capsule capsule, TAKE 1 CAPSULE BY MOUTH EVERY WEEK, Disp: 12 capsule, Rfl: 0    Objective:  Visit Vitals   • /70   • Pulse 58   • Wt 180 lb (81.6 kg)   • BMI 30.9 kg/m2        Physical Exam  Physical Exam:    General: No acute distress.    Skin: Warm and dry, no diaphoresis noted   HEENT: No ptosis; external ear and nose normal; oral mucosa moist   Neck: Supple; no carotid bruits; no JVD, Trachea mid line   Heart: S1S2 regular rate and rhythm; no murmurs; no gallop or rub appreciated, apex not displaced   Chest: Respirations regular, unlabored at rest, bilateral breath sounds have good air entry; no  wheezes auscultated.     Abdomen: Soft, non-tender, non-distended, positive bowel sounds  No hepatosplenomegaly   Extremities: Bilateral lower extremities have no pre-tibial pitting edema; Radials are palpable   Neurological: Alert and oriented x 3; no new motor deficits,           ECG 12 Lead  Date/Time: 1/24/2017 4:16 PM  Performed by: QUIN DA SILVA  Authorized by: QUIN DA SILVA   Comparison: compared with previous ECG   Similar to previous ECG  Rhythm: sinus rhythm  Rate: normal  QRS axis: left               Assessment:  Problem List Items Addressed This Visit        Cardiovascular and Mediastinum    Hypertension - Primary       Respiratory    Shortness of breath on exertion       Endocrine    Impaired fasting glucose       Other    Depression          Plan:    Overall she  is still complaining of significant shortness of breath on exertion and occasional dyspnea. Her occluded RCA may be causing these symptoms, but she is known to have it occluded for 4 years and I don't know why she is having symptoms worsening in the last 6 months    I will obtain right and left heart cath to further evaluate her marked dyspnea on exertion.        01/24/2017  Ricardo Salvador MD, FACC

## 2017-01-26 ENCOUNTER — TRANSCRIBE ORDERS (OUTPATIENT)
Dept: CARDIOLOGY | Facility: CLINIC | Age: 77
End: 2017-01-26

## 2017-01-26 DIAGNOSIS — I25.10 CAD IN NATIVE ARTERY: ICD-10-CM

## 2017-01-26 DIAGNOSIS — R06.02 SHORTNESS OF BREATH ON EXERTION: ICD-10-CM

## 2017-01-26 DIAGNOSIS — I10 HYPERTENSION, ESSENTIAL: Primary | ICD-10-CM

## 2017-01-30 ENCOUNTER — LAB (OUTPATIENT)
Dept: LAB | Facility: HOSPITAL | Age: 77
End: 2017-01-30

## 2017-01-30 DIAGNOSIS — R06.02 SHORTNESS OF BREATH ON EXERTION: ICD-10-CM

## 2017-01-30 DIAGNOSIS — I25.10 CAD IN NATIVE ARTERY: ICD-10-CM

## 2017-01-30 DIAGNOSIS — I10 HYPERTENSION, ESSENTIAL: ICD-10-CM

## 2017-01-30 LAB
ANION GAP SERPL CALCULATED.3IONS-SCNC: 13.9 MMOL/L
APTT PPP: 31.1 SECONDS (ref 22.7–35.4)
BASOPHILS # BLD AUTO: 0.03 10*3/MM3 (ref 0–0.2)
BASOPHILS NFR BLD AUTO: 0.4 % (ref 0–1.5)
BUN BLD-MCNC: 16 MG/DL (ref 8–23)
BUN/CREAT SERPL: 20.3 (ref 7–25)
CALCIUM SPEC-SCNC: 9.2 MG/DL (ref 8.6–10.5)
CHLORIDE SERPL-SCNC: 105 MMOL/L (ref 98–107)
CO2 SERPL-SCNC: 27.1 MMOL/L (ref 22–29)
CREAT BLD-MCNC: 0.79 MG/DL (ref 0.57–1)
DEPRECATED RDW RBC AUTO: 50 FL (ref 37–54)
EOSINOPHIL # BLD AUTO: 0.1 10*3/MM3 (ref 0–0.7)
EOSINOPHIL NFR BLD AUTO: 1.4 % (ref 0.3–6.2)
ERYTHROCYTE [DISTWIDTH] IN BLOOD BY AUTOMATED COUNT: 15.6 % (ref 11.7–13)
GFR SERPL CREATININE-BSD FRML MDRD: 71 ML/MIN/1.73
GLUCOSE BLD-MCNC: 94 MG/DL (ref 65–99)
HCT VFR BLD AUTO: 36.5 % (ref 35.6–45.5)
HGB BLD-MCNC: 10.9 G/DL (ref 11.9–15.5)
IMM GRANULOCYTES # BLD: 0.02 10*3/MM3 (ref 0–0.03)
IMM GRANULOCYTES NFR BLD: 0.3 % (ref 0–0.5)
INR PPP: 1.03 (ref 0.9–1.1)
LYMPHOCYTES # BLD AUTO: 1.69 10*3/MM3 (ref 0.9–4.8)
LYMPHOCYTES NFR BLD AUTO: 24.5 % (ref 19.6–45.3)
MCH RBC QN AUTO: 26.1 PG (ref 26.9–32)
MCHC RBC AUTO-ENTMCNC: 29.9 G/DL (ref 32.4–36.3)
MCV RBC AUTO: 87.3 FL (ref 80.5–98.2)
MONOCYTES # BLD AUTO: 0.58 10*3/MM3 (ref 0.2–1.2)
MONOCYTES NFR BLD AUTO: 8.4 % (ref 5–12)
NEUTROPHILS # BLD AUTO: 4.48 10*3/MM3 (ref 1.9–8.1)
NEUTROPHILS NFR BLD AUTO: 65 % (ref 42.7–76)
NRBC BLD MANUAL-RTO: 0 /100 WBC (ref 0–0)
PLATELET # BLD AUTO: 296 10*3/MM3 (ref 140–500)
PMV BLD AUTO: 9.6 FL (ref 6–12)
POTASSIUM BLD-SCNC: 3.7 MMOL/L (ref 3.5–5.2)
PROTHROMBIN TIME: 13.1 SECONDS (ref 11.7–14.2)
RBC # BLD AUTO: 4.18 10*6/MM3 (ref 3.9–5.2)
SODIUM BLD-SCNC: 146 MMOL/L (ref 136–145)
WBC NRBC COR # BLD: 6.9 10*3/MM3 (ref 4.5–10.7)

## 2017-01-30 PROCEDURE — 85025 COMPLETE CBC W/AUTO DIFF WBC: CPT

## 2017-01-30 PROCEDURE — 85730 THROMBOPLASTIN TIME PARTIAL: CPT

## 2017-01-30 PROCEDURE — 36415 COLL VENOUS BLD VENIPUNCTURE: CPT

## 2017-01-30 PROCEDURE — 85610 PROTHROMBIN TIME: CPT

## 2017-01-30 PROCEDURE — 80048 BASIC METABOLIC PNL TOTAL CA: CPT

## 2017-01-31 ENCOUNTER — HOSPITAL ENCOUNTER (OUTPATIENT)
Facility: HOSPITAL | Age: 77
Setting detail: HOSPITAL OUTPATIENT SURGERY
Discharge: HOME | End: 2017-01-31
Attending: INTERNAL MEDICINE | Admitting: INTERNAL MEDICINE

## 2017-01-31 VITALS
OXYGEN SATURATION: 97 % | HEIGHT: 64 IN | SYSTOLIC BLOOD PRESSURE: 116 MMHG | DIASTOLIC BLOOD PRESSURE: 84 MMHG | WEIGHT: 173.56 LBS | BODY MASS INDEX: 29.63 KG/M2 | RESPIRATION RATE: 16 BRPM | TEMPERATURE: 99.1 F | HEART RATE: 52 BPM

## 2017-01-31 DIAGNOSIS — R73.01 IMPAIRED FASTING GLUCOSE: ICD-10-CM

## 2017-01-31 DIAGNOSIS — I25.10 CAD IN NATIVE ARTERY: ICD-10-CM

## 2017-01-31 DIAGNOSIS — F32.89 OTHER DEPRESSION: ICD-10-CM

## 2017-01-31 DIAGNOSIS — I10 ESSENTIAL HYPERTENSION: ICD-10-CM

## 2017-01-31 DIAGNOSIS — R06.02 SHORTNESS OF BREATH ON EXERTION: ICD-10-CM

## 2017-01-31 PROCEDURE — C1894 INTRO/SHEATH, NON-LASER: HCPCS | Performed by: INTERNAL MEDICINE

## 2017-01-31 PROCEDURE — C1769 GUIDE WIRE: HCPCS | Performed by: INTERNAL MEDICINE

## 2017-01-31 PROCEDURE — 25010000002 FENTANYL CITRATE (PF) 100 MCG/2ML SOLUTION: Performed by: INTERNAL MEDICINE

## 2017-01-31 PROCEDURE — 93459 L HRT ART/GRFT ANGIO: CPT | Performed by: INTERNAL MEDICINE

## 2017-01-31 PROCEDURE — 93460 R&L HRT ART/VENTRICLE ANGIO: CPT | Performed by: INTERNAL MEDICINE

## 2017-01-31 PROCEDURE — 25010000002 MIDAZOLAM PER 1 MG: Performed by: INTERNAL MEDICINE

## 2017-01-31 PROCEDURE — 0 IOPAMIDOL PER 1 ML: Performed by: INTERNAL MEDICINE

## 2017-01-31 PROCEDURE — 25010000002 HEPARIN (PORCINE) PER 1000 UNITS: Performed by: INTERNAL MEDICINE

## 2017-01-31 RX ORDER — SODIUM CHLORIDE 0.9 % (FLUSH) 0.9 %
1-10 SYRINGE (ML) INJECTION AS NEEDED
Status: DISCONTINUED | OUTPATIENT
Start: 2017-01-31 | End: 2017-01-31 | Stop reason: HOSPADM

## 2017-01-31 RX ORDER — LIDOCAINE HYDROCHLORIDE 10 MG/ML
0.1 INJECTION, SOLUTION EPIDURAL; INFILTRATION; INTRACAUDAL; PERINEURAL ONCE AS NEEDED
Status: DISCONTINUED | OUTPATIENT
Start: 2017-01-31 | End: 2017-01-31 | Stop reason: HOSPADM

## 2017-01-31 RX ORDER — VERAPAMIL HYDROCHLORIDE 2.5 MG/ML
INJECTION, SOLUTION INTRAVENOUS AS NEEDED
Status: DISCONTINUED | OUTPATIENT
Start: 2017-01-31 | End: 2017-01-31 | Stop reason: HOSPADM

## 2017-01-31 RX ORDER — LIDOCAINE HYDROCHLORIDE 20 MG/ML
INJECTION, SOLUTION INFILTRATION; PERINEURAL AS NEEDED
Status: DISCONTINUED | OUTPATIENT
Start: 2017-01-31 | End: 2017-01-31 | Stop reason: HOSPADM

## 2017-01-31 RX ORDER — SODIUM CHLORIDE 9 MG/ML
75 INJECTION, SOLUTION INTRAVENOUS CONTINUOUS
Status: DISCONTINUED | OUTPATIENT
Start: 2017-01-31 | End: 2017-01-31 | Stop reason: HOSPADM

## 2017-01-31 RX ORDER — SODIUM CHLORIDE 9 MG/ML
125 INJECTION, SOLUTION INTRAVENOUS CONTINUOUS
Status: DISCONTINUED | OUTPATIENT
Start: 2017-01-31 | End: 2017-01-31 | Stop reason: HOSPADM

## 2017-01-31 RX ORDER — MIDAZOLAM HYDROCHLORIDE 1 MG/ML
INJECTION INTRAMUSCULAR; INTRAVENOUS AS NEEDED
Status: DISCONTINUED | OUTPATIENT
Start: 2017-01-31 | End: 2017-01-31 | Stop reason: HOSPADM

## 2017-01-31 RX ORDER — AMLODIPINE BESYLATE 5 MG/1
5 TABLET ORAL DAILY
COMMUNITY
End: 2017-06-27

## 2017-01-31 RX ORDER — ATORVASTATIN CALCIUM 40 MG/1
40 TABLET, FILM COATED ORAL DAILY
COMMUNITY
End: 2017-08-14 | Stop reason: SDUPTHER

## 2017-01-31 RX ORDER — ERGOCALCIFEROL 1.25 MG/1
50000 CAPSULE ORAL
COMMUNITY
End: 2018-07-16 | Stop reason: SDUPTHER

## 2017-01-31 RX ORDER — DICLOFENAC SODIUM AND MISOPROSTOL 75; 200 MG/1; UG/1
1 TABLET, DELAYED RELEASE ORAL 2 TIMES DAILY
COMMUNITY
End: 2017-05-23 | Stop reason: ALTCHOICE

## 2017-01-31 RX ORDER — FENTANYL CITRATE 50 UG/ML
INJECTION, SOLUTION INTRAMUSCULAR; INTRAVENOUS AS NEEDED
Status: DISCONTINUED | OUTPATIENT
Start: 2017-01-31 | End: 2017-01-31 | Stop reason: HOSPADM

## 2017-01-31 RX ORDER — FUROSEMIDE 20 MG/1
20 TABLET ORAL 2 TIMES DAILY
COMMUNITY
End: 2017-06-02

## 2017-01-31 RX ORDER — SERTRALINE HYDROCHLORIDE 25 MG/1
25 TABLET, FILM COATED ORAL DAILY
COMMUNITY
End: 2017-08-17

## 2017-01-31 RX ORDER — PANTOPRAZOLE SODIUM 40 MG/1
40 TABLET, DELAYED RELEASE ORAL DAILY
COMMUNITY
End: 2017-08-15

## 2017-01-31 RX ORDER — NITROGLYCERIN 5 MG/ML
INJECTION, SOLUTION INTRAVENOUS AS NEEDED
Status: DISCONTINUED | OUTPATIENT
Start: 2017-01-31 | End: 2017-01-31 | Stop reason: HOSPADM

## 2017-01-31 RX ADMIN — SODIUM CHLORIDE 75 ML/HR: 9 INJECTION, SOLUTION INTRAVENOUS at 07:52

## 2017-02-01 LAB
HCT VFR BLDA CALC: 27 % (ref 38–51)
HGB BLDA-MCNC: 9.2 G/DL (ref 12–17)
SAO2 % BLDA: 64 % (ref 95–98)

## 2017-02-02 LAB
HCT VFR BLDA CALC: 26 % (ref 38–51)
HCT VFR BLDA CALC: 27 % (ref 38–51)
HCT VFR BLDA CALC: 28 % (ref 38–51)
HGB BLDA-MCNC: 8.8 G/DL (ref 12–17)
HGB BLDA-MCNC: 9.2 G/DL (ref 12–17)
HGB BLDA-MCNC: 9.5 G/DL (ref 12–17)
SAO2 % BLDA: 67 % (ref 95–98)
SAO2 % BLDA: 89 % (ref 95–98)
SAO2 % BLDA: 93 % (ref 95–98)

## 2017-02-03 ENCOUNTER — TELEPHONE (OUTPATIENT)
Dept: CARDIOLOGY | Facility: CLINIC | Age: 77
End: 2017-02-03

## 2017-02-03 NOTE — TELEPHONE ENCOUNTER
----- Message from Heike Tang sent at 2/2/2017  8:34 AM EST -----  Contact: [phone]  Had cath on Tuesday when does she need to f/u?    Per [facility] can follow up in 3 months

## 2017-02-09 ENCOUNTER — OFFICE VISIT (OUTPATIENT)
Dept: FAMILY MEDICINE CLINIC | Facility: CLINIC | Age: 77
End: 2017-02-09

## 2017-02-09 VITALS
BODY MASS INDEX: 30.87 KG/M2 | HEART RATE: 64 BPM | HEIGHT: 64 IN | WEIGHT: 180.8 LBS | RESPIRATION RATE: 18 BRPM | SYSTOLIC BLOOD PRESSURE: 122 MMHG | DIASTOLIC BLOOD PRESSURE: 64 MMHG

## 2017-02-09 DIAGNOSIS — R06.02 SHORTNESS OF BREATH ON EXERTION: Primary | ICD-10-CM

## 2017-02-09 DIAGNOSIS — J98.4 RESTRICTIVE LUNG DISEASE: ICD-10-CM

## 2017-02-09 PROCEDURE — 99214 OFFICE O/P EST MOD 30 MIN: CPT | Performed by: INTERNAL MEDICINE

## 2017-02-09 PROCEDURE — 94010 BREATHING CAPACITY TEST: CPT | Performed by: INTERNAL MEDICINE

## 2017-02-09 NOTE — PROGRESS NOTES
Juan Manuel Joshua is a 76 y.o. female. Patient is here today for   Chief Complaint   Patient presents with   • Hospital Follow Up          Vitals:    02/09/17 1451   BP: 122/64   Pulse: 64   Resp: 18     The following portions of the patient's history were reviewed and updated as appropriate: allergies, current medications, past family history, past medical history, past social history, past surgical history and problem list.    Past Medical History   Diagnosis Date   • Depression    • Glaucoma    • Hyperglycemia    • Hyperlipidemia    • Hypertension    • Osteoarthritis       No Known Allergies   Social History     Social History   • Marital status:      Spouse name: N/A   • Number of children: N/A   • Years of education: N/A     Occupational History   • Not on file.     Social History Main Topics   • Smoking status: Former Smoker     Packs/day: 1.00     Years: 20.00     Types: Cigarettes     Quit date: 1987   • Smokeless tobacco: Not on file   • Alcohol use 1.2 oz/week     1 Glasses of wine, 1 Cans of beer per week   • Drug use: No   • Sexual activity: Defer     Other Topics Concern   • Not on file     Social History Narrative        Current Outpatient Prescriptions:   •  amLODIPine (NORVASC) 5 MG tablet, Take 5 mg by mouth Daily., Disp: , Rfl:   •  aspirin 81 MG EC tablet, Take 81 mg by mouth Daily., Disp: , Rfl:   •  atorvastatin (LIPITOR) 40 MG tablet, Take 40 mg by mouth Daily., Disp: , Rfl:   •  BESIVANCE 0.6 % suspension ophthalmic suspension, SHAKE LQ AND INT 1 GTT IN OD BID, Disp: , Rfl: 1  •  diclofenac-misoprostol (ARTHROTEC 75) 75-0.2 MG EC tablet, Take 1 tablet by mouth 2 (Two) Times a Day., Disp: , Rfl:   •  fluticasone (FLONASE) 50 MCG/ACT nasal spray, 2 sprays into each nostril., Disp: , Rfl:   •  furosemide (LASIX) 20 MG tablet, Take 20 mg by mouth 2 (Two) Times a Day., Disp: , Rfl:   •  Glucosamine HCl (GLUCOSAMINE PO), Take 50 mg by mouth Daily., Disp: , Rfl:   •  Multiple  Vitamins-Minerals (EYE VITAMINS PO), Take  by mouth., Disp: , Rfl:   •  pantoprazole (PROTONIX) 40 MG EC tablet, Take 40 mg by mouth Daily., Disp: , Rfl:   •  sertraline (ZOLOFT) 25 MG tablet, Take 25 mg by mouth Daily., Disp: , Rfl:   •  vitamin D (ERGOCALCIFEROL) 00699 UNITS capsule capsule, Take 50,000 Units by mouth Every 7 (Seven) Days., Disp: , Rfl:      Objective     History of Present Illness  Estela is here today with her daughter.  She recently had a stents of cardiovascular workup to evaluate complaints of dyspnea on exertion including an echocardiogram, nuclear stress test, and cardiac catheterization.  She was not found to have any significant coronary artery disease.  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  Her echocardiogram is normal except for some moderate mitral regurgitation.  She does have a 20-pack-year history of smoking and is currently a nonsmoker.    Review of Systems   Respiratory: Positive for shortness of breath.    Cardiovascular: Negative for chest pain and leg swelling.       Physical Exam   Cardiovascular: Normal rate, regular rhythm and normal heart sounds.    Trace ankle edema   Pulmonary/Chest: Effort normal and breath sounds normal. She has no wheezes.   Neurological: She is alert.       ASSESSMENT     Problem List Items Addressed This Visit        Respiratory    Shortness of breath on exertion - Primary    Relevant Orders    Breathing Capacity Test (Completed)    Restrictive lung disease          PLAN  Patient Instructions   Reviewed and discussed your extensive cardiovascular workup.  Pulmonary function testing today is a mild restrictive defect and borderline obstructive defect.  Try Symbicort 80/4.5 one puff twice a day sample.  Also discussed starting an exercise program for weight loss and to improve pulmonary function.    Return if symptoms worsen or fail to improve, for Next  scheduled follow up.

## 2017-02-09 NOTE — PATIENT INSTRUCTIONS
Reviewed and discussed your extensive cardiovascular workup.  Pulmonary function testing today is a mild restrictive defect and borderline obstructive defect.  Try Symbicort 80/4.5 one puff twice a day sample.  Also discussed starting an exercise program for weight loss and to improve pulmonary function.

## 2017-02-13 RX ORDER — ATORVASTATIN CALCIUM 40 MG/1
TABLET, FILM COATED ORAL
Qty: 90 TABLET | Refills: 0 | Status: SHIPPED | OUTPATIENT
Start: 2017-02-13 | End: 2017-05-13 | Stop reason: SDUPTHER

## 2017-02-16 RX ORDER — AMLODIPINE BESYLATE 5 MG/1
TABLET ORAL
Qty: 180 TABLET | Refills: 0 | Status: SHIPPED | OUTPATIENT
Start: 2017-02-16 | End: 2017-05-13 | Stop reason: SDUPTHER

## 2017-02-16 RX ORDER — SERTRALINE HYDROCHLORIDE 25 MG/1
TABLET, FILM COATED ORAL
Qty: 90 TABLET | Refills: 0 | Status: SHIPPED | OUTPATIENT
Start: 2017-02-16 | End: 2017-05-13 | Stop reason: SDUPTHER

## 2017-03-01 RX ORDER — DICLOFENAC SODIUM 75 MG/1
TABLET, DELAYED RELEASE ORAL
Qty: 180 TABLET | Refills: 0 | Status: SHIPPED | OUTPATIENT
Start: 2017-03-01 | End: 2017-05-19 | Stop reason: SDUPTHER

## 2017-03-07 RX ORDER — BUDESONIDE AND FORMOTEROL FUMARATE DIHYDRATE 80; 4.5 UG/1; UG/1
2 AEROSOL RESPIRATORY (INHALATION)
Qty: 10.2 G | Refills: 5 | Status: SHIPPED | OUTPATIENT
Start: 2017-03-07 | End: 2017-12-05 | Stop reason: SDUPTHER

## 2017-03-08 RX ORDER — FUROSEMIDE 20 MG/1
TABLET ORAL
Qty: 90 TABLET | Refills: 0 | Status: SHIPPED | OUTPATIENT
Start: 2017-03-08 | End: 2017-06-01 | Stop reason: SDUPTHER

## 2017-04-21 RX ORDER — ERGOCALCIFEROL 1.25 MG/1
CAPSULE ORAL
Qty: 12 CAPSULE | Refills: 0 | Status: SHIPPED | OUTPATIENT
Start: 2017-04-21 | End: 2017-08-17

## 2017-05-15 RX ORDER — SERTRALINE HYDROCHLORIDE 25 MG/1
TABLET, FILM COATED ORAL
Qty: 90 TABLET | Refills: 0 | Status: SHIPPED | OUTPATIENT
Start: 2017-05-15 | End: 2017-08-11 | Stop reason: SDUPTHER

## 2017-05-15 RX ORDER — ATORVASTATIN CALCIUM 40 MG/1
TABLET, FILM COATED ORAL
Qty: 90 TABLET | Refills: 0 | Status: SHIPPED | OUTPATIENT
Start: 2017-05-15 | End: 2017-08-11 | Stop reason: SDUPTHER

## 2017-05-15 RX ORDER — AMLODIPINE BESYLATE 5 MG/1
TABLET ORAL
Qty: 180 TABLET | Refills: 0 | Status: SHIPPED | OUTPATIENT
Start: 2017-05-15 | End: 2017-08-11 | Stop reason: SDUPTHER

## 2017-05-19 RX ORDER — PANTOPRAZOLE SODIUM 40 MG/1
TABLET, DELAYED RELEASE ORAL
Qty: 90 TABLET | Refills: 0 | Status: SHIPPED | OUTPATIENT
Start: 2017-05-19 | End: 2017-08-14 | Stop reason: SDUPTHER

## 2017-05-19 RX ORDER — DICLOFENAC SODIUM 75 MG/1
TABLET, DELAYED RELEASE ORAL
Qty: 180 TABLET | Refills: 0 | Status: SHIPPED | OUTPATIENT
Start: 2017-05-19 | End: 2017-05-23 | Stop reason: ALTCHOICE

## 2017-05-23 ENCOUNTER — OFFICE VISIT (OUTPATIENT)
Dept: CARDIOLOGY | Facility: CLINIC | Age: 77
End: 2017-05-23

## 2017-05-23 VITALS
WEIGHT: 177 LBS | BODY MASS INDEX: 30.22 KG/M2 | HEART RATE: 59 BPM | DIASTOLIC BLOOD PRESSURE: 68 MMHG | HEIGHT: 64 IN | SYSTOLIC BLOOD PRESSURE: 123 MMHG

## 2017-05-23 DIAGNOSIS — R06.02 SHORTNESS OF BREATH ON EXERTION: ICD-10-CM

## 2017-05-23 DIAGNOSIS — I10 ESSENTIAL HYPERTENSION: Primary | ICD-10-CM

## 2017-05-23 PROCEDURE — 99215 OFFICE O/P EST HI 40 MIN: CPT | Performed by: INTERNAL MEDICINE

## 2017-05-23 PROCEDURE — 93000 ELECTROCARDIOGRAM COMPLETE: CPT | Performed by: INTERNAL MEDICINE

## 2017-06-02 RX ORDER — FUROSEMIDE 20 MG/1
TABLET ORAL
Qty: 90 TABLET | Refills: 0 | Status: SHIPPED | OUTPATIENT
Start: 2017-06-02 | End: 2017-11-06 | Stop reason: SDUPTHER

## 2017-06-19 DIAGNOSIS — I10 ESSENTIAL HYPERTENSION: ICD-10-CM

## 2017-06-19 DIAGNOSIS — E78.5 HYPERLIPIDEMIA, UNSPECIFIED HYPERLIPIDEMIA TYPE: ICD-10-CM

## 2017-06-20 LAB
ALBUMIN SERPL-MCNC: 4.1 G/DL (ref 3.5–5.2)
ALBUMIN/GLOB SERPL: 1.7 G/DL
ALP SERPL-CCNC: 57 U/L (ref 39–117)
ALT SERPL-CCNC: 15 U/L (ref 1–33)
AST SERPL-CCNC: 11 U/L (ref 1–32)
BASOPHILS # BLD AUTO: 0.02 10*3/MM3 (ref 0–0.2)
BASOPHILS NFR BLD AUTO: 0.3 % (ref 0–1.5)
BILIRUB SERPL-MCNC: 0.2 MG/DL (ref 0.1–1.2)
BUN SERPL-MCNC: 16 MG/DL (ref 8–23)
BUN/CREAT SERPL: 20.3 (ref 7–25)
CALCIUM SERPL-MCNC: 8.8 MG/DL (ref 8.6–10.5)
CHLORIDE SERPL-SCNC: 106 MMOL/L (ref 98–107)
CHOLEST SERPL-MCNC: 150 MG/DL (ref 0–200)
CO2 SERPL-SCNC: 24.8 MMOL/L (ref 22–29)
CREAT SERPL-MCNC: 0.79 MG/DL (ref 0.57–1)
EOSINOPHIL # BLD AUTO: 0.1 10*3/MM3 (ref 0–0.7)
EOSINOPHIL NFR BLD AUTO: 1.7 % (ref 0.3–6.2)
ERYTHROCYTE [DISTWIDTH] IN BLOOD BY AUTOMATED COUNT: 16.5 % (ref 11.7–13)
GLOBULIN SER CALC-MCNC: 2.4 GM/DL
GLUCOSE SERPL-MCNC: 110 MG/DL (ref 65–99)
HCT VFR BLD AUTO: 35.6 % (ref 35.6–45.5)
HDLC SERPL-MCNC: 45 MG/DL (ref 40–60)
HGB BLD-MCNC: 10.8 G/DL (ref 11.9–15.5)
IMM GRANULOCYTES # BLD: 0 10*3/MM3 (ref 0–0.03)
IMM GRANULOCYTES NFR BLD: 0 % (ref 0–0.5)
LDLC SERPL CALC-MCNC: 72 MG/DL (ref 0–100)
LDLC/HDLC SERPL: 1.6 {RATIO}
LYMPHOCYTES # BLD AUTO: 1.34 10*3/MM3 (ref 0.9–4.8)
LYMPHOCYTES NFR BLD AUTO: 23.2 % (ref 19.6–45.3)
MCH RBC QN AUTO: 26.2 PG (ref 26.9–32)
MCHC RBC AUTO-ENTMCNC: 30.3 G/DL (ref 32.4–36.3)
MCV RBC AUTO: 86.4 FL (ref 80.5–98.2)
MONOCYTES # BLD AUTO: 0.44 10*3/MM3 (ref 0.2–1.2)
MONOCYTES NFR BLD AUTO: 7.6 % (ref 5–12)
NEUTROPHILS # BLD AUTO: 3.87 10*3/MM3 (ref 1.9–8.1)
NEUTROPHILS NFR BLD AUTO: 67.2 % (ref 42.7–76)
PLATELET # BLD AUTO: 258 10*3/MM3 (ref 140–500)
POTASSIUM SERPL-SCNC: 4 MMOL/L (ref 3.5–5.2)
PROT SERPL-MCNC: 6.5 G/DL (ref 6–8.5)
RBC # BLD AUTO: 4.12 10*6/MM3 (ref 3.9–5.2)
SODIUM SERPL-SCNC: 146 MMOL/L (ref 136–145)
TRIGL SERPL-MCNC: 165 MG/DL (ref 0–150)
TSH SERPL DL<=0.005 MIU/L-ACNC: 2.21 MIU/ML (ref 0.27–4.2)
VLDLC SERPL CALC-MCNC: 33 MG/DL (ref 5–40)
WBC # BLD AUTO: 5.77 10*3/MM3 (ref 4.5–10.7)

## 2017-06-27 ENCOUNTER — OFFICE VISIT (OUTPATIENT)
Dept: FAMILY MEDICINE CLINIC | Facility: CLINIC | Age: 77
End: 2017-06-27

## 2017-06-27 VITALS
BODY MASS INDEX: 30.59 KG/M2 | SYSTOLIC BLOOD PRESSURE: 136 MMHG | WEIGHT: 179.2 LBS | DIASTOLIC BLOOD PRESSURE: 62 MMHG | HEIGHT: 64 IN | HEART RATE: 62 BPM | RESPIRATION RATE: 18 BRPM

## 2017-06-27 DIAGNOSIS — M25.50 ARTHRALGIA, UNSPECIFIED JOINT: ICD-10-CM

## 2017-06-27 DIAGNOSIS — I10 ESSENTIAL HYPERTENSION: Primary | ICD-10-CM

## 2017-06-27 DIAGNOSIS — D64.9 ANEMIA, UNSPECIFIED TYPE: ICD-10-CM

## 2017-06-27 DIAGNOSIS — E78.5 HYPERLIPIDEMIA, UNSPECIFIED HYPERLIPIDEMIA TYPE: ICD-10-CM

## 2017-06-27 PROCEDURE — 99214 OFFICE O/P EST MOD 30 MIN: CPT | Performed by: INTERNAL MEDICINE

## 2017-06-27 NOTE — PROGRESS NOTES
Juan Manuel Joshua is a 76 y.o. female. Patient is here today for   Chief Complaint   Patient presents with   • Hyperlipidemia   • Hypertension          Vitals:    06/27/17 0932   BP: 136/62   Pulse: 62   Resp: 18       The following portions of the patient's history were reviewed and updated as appropriate: allergies, current medications, past family history, past medical history, past social history, past surgical history and problem list.    Past Medical History:   Diagnosis Date   • Depression    • Glaucoma    • Hyperglycemia    • Hyperlipidemia    • Hypertension    • Osteoarthritis       No Known Allergies   Social History     Social History   • Marital status:      Spouse name: N/A   • Number of children: N/A   • Years of education: N/A     Occupational History   • Not on file.     Social History Main Topics   • Smoking status: Former Smoker     Packs/day: 1.00     Years: 20.00     Types: Cigarettes     Quit date: 1987   • Smokeless tobacco: Not on file   • Alcohol use 1.2 oz/week     1 Glasses of wine, 1 Cans of beer per week   • Drug use: No   • Sexual activity: Defer     Other Topics Concern   • Not on file     Social History Narrative        Current Outpatient Prescriptions:   •  amLODIPine (NORVASC) 5 MG tablet, TAKE 1 TABLET BY MOUTH TWICE DAILY, Disp: 180 tablet, Rfl: 0  •  aspirin 81 MG EC tablet, Take 81 mg by mouth Daily., Disp: , Rfl:   •  atorvastatin (LIPITOR) 40 MG tablet, Take 40 mg by mouth Daily., Disp: , Rfl:   •  atorvastatin (LIPITOR) 40 MG tablet, TAKE 1 TABLET BY MOUTH EVERY DAY, Disp: 90 tablet, Rfl: 0  •  BESIVANCE 0.6 % suspension ophthalmic suspension, SHAKE LQ AND INT 1 GTT IN OD BID, Disp: , Rfl: 1  •  budesonide-formoterol (SYMBICORT) 80-4.5 MCG/ACT inhaler, Inhale 2 puffs 2 (Two) Times a Day., Disp: 10.2 g, Rfl: 5  •  fluticasone (FLONASE) 50 MCG/ACT nasal spray, 2 sprays into each nostril., Disp: , Rfl:   •  furosemide (LASIX) 20 MG tablet, TAKE 1 TABLET BY MOUTH  EVERY MORNING, Disp: 90 tablet, Rfl: 0  •  Glucosamine HCl (GLUCOSAMINE PO), Take 50 mg by mouth Daily., Disp: , Rfl:   •  Multiple Vitamins-Minerals (EYE VITAMINS PO), Take  by mouth., Disp: , Rfl:   •  pantoprazole (PROTONIX) 40 MG EC tablet, Take 40 mg by mouth Daily., Disp: , Rfl:   •  pantoprazole (PROTONIX) 40 MG EC tablet, TAKE 1 TABLET BY MOUTH EVERY DAY, Disp: 90 tablet, Rfl: 0  •  sertraline (ZOLOFT) 25 MG tablet, Take 25 mg by mouth Daily., Disp: , Rfl:   •  sertraline (ZOLOFT) 25 MG tablet, TAKE 1 TABLET BY MOUTH EVERY DAY, Disp: 90 tablet, Rfl: 0  •  vitamin D (ERGOCALCIFEROL) 30111 UNITS capsule capsule, Take 50,000 Units by mouth Every 7 (Seven) Days., Disp: , Rfl:   •  vitamin D (ERGOCALCIFEROL) 57331 UNITS capsule capsule, TAKE 1 CAPSULE BY MOUTH EVERY WEEK, Disp: 12 capsule, Rfl: 0     Objective   History of Present Illness Estela is here today for a blood pressure and lab follow-up.  She has hypertension, hyperlipidemia, anemia, COPD, arthritis, depression, and gastroesophageal reflux.  She eats healthy and is physically active.  She had a cardiac cath in January when she complained of redness of breath on exertion.  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  She has complaints of arthritis and was on diclofenac but was told to stop it.  She has diffuse arthralgias and wonders if she could start taking it again.    Review of Systems   Constitutional: Positive for activity change. Negative for unexpected weight change.   Cardiovascular: Negative for chest pain and leg swelling.   Musculoskeletal: Positive for arthralgias.   Neurological: Negative for light-headedness.       Physical Exam   Constitutional: She appears well-developed and well-nourished.   Neck: Carotid bruit is not present. No thyromegaly present.   Cardiovascular: Normal rate, regular rhythm and normal heart sounds.    Pulmonary/Chest: Effort  normal and breath sounds normal.   Neurological: She is alert.   Psychiatric: She has a normal mood and affect.   Vitals reviewed.      ASSESSMENT     Problem List Items Addressed This Visit        Cardiovascular and Mediastinum    Hyperlipidemia    Hypertension - Primary       Nervous and Auditory    Arthralgia    Relevant Orders    Sedimentation Rate    Uric Acid    Rheumatoid Factor, Quant    GEOFFREY       Hematopoietic and Hemostatic    Anemia    Relevant Orders    Iron Profile    Ferritin          PLAN  Patient Instructions   Your blood pressure is stable.  Total cholesterol is 150 and triglycerides are mildly elevated at 165.  HDL is 45 and LDL 72.  Fasting blood sugar is 110.  Hemoglobin is decreased at 10.8.  Thyroid-stimulating hormone level is normal.  We'll recheck some iron studies as well as arthritis studies.  Will most likely need upper and lower endoscopy.  Her last colonoscopy was 4 and 1 half years ago.      Return in about 1 month (around 7/27/2017), or with CBC.

## 2017-06-27 NOTE — PATIENT INSTRUCTIONS
Your blood pressure is stable.  Total cholesterol is 150 and triglycerides are mildly elevated at 165.  HDL is 45 and LDL 72.  Fasting blood sugar is 110.  Hemoglobin is decreased at 10.8.  Thyroid-stimulating hormone level is normal.  We'll recheck some iron studies as well as arthritis studies.  Will most likely need upper and lower endoscopy.  Her last colonoscopy was 4 and 1 half years ago.

## 2017-06-28 LAB
ANA SER QL: NEGATIVE
ERYTHROCYTE [SEDIMENTATION RATE] IN BLOOD BY WESTERGREN METHOD: 14 MM/HR (ref 0–30)
FERRITIN SERPL-MCNC: 11.66 NG/ML (ref 13–150)
IRON SATN MFR SERPL: 6 % (ref 20–50)
IRON SERPL-MCNC: 36 MCG/DL (ref 37–145)
RHEUMATOID FACT SERPL-ACNC: <10 IU/ML (ref 0–13.9)
TIBC SERPL-MCNC: 569 MCG/DL
UIBC SERPL-MCNC: 533 MCG/DL
URATE SERPL-MCNC: 5.6 MG/DL (ref 2.4–5.7)

## 2017-06-29 ENCOUNTER — TELEPHONE (OUTPATIENT)
Dept: FAMILY MEDICINE CLINIC | Facility: CLINIC | Age: 77
End: 2017-06-29

## 2017-06-29 NOTE — TELEPHONE ENCOUNTER
I called patient and notified her, per Dr. Sheldon, that she should take Iron OTC twice daily.  Patient understood

## 2017-07-26 ENCOUNTER — OFFICE VISIT (OUTPATIENT)
Dept: FAMILY MEDICINE CLINIC | Facility: CLINIC | Age: 77
End: 2017-07-26

## 2017-07-26 VITALS
DIASTOLIC BLOOD PRESSURE: 58 MMHG | HEART RATE: 74 BPM | HEIGHT: 64 IN | BODY MASS INDEX: 30.05 KG/M2 | RESPIRATION RATE: 20 BRPM | SYSTOLIC BLOOD PRESSURE: 124 MMHG | WEIGHT: 176 LBS

## 2017-07-26 DIAGNOSIS — K21.9 GASTROESOPHAGEAL REFLUX DISEASE, ESOPHAGITIS PRESENCE NOT SPECIFIED: Primary | ICD-10-CM

## 2017-07-26 DIAGNOSIS — D64.9 ANEMIA, UNSPECIFIED TYPE: ICD-10-CM

## 2017-07-26 PROCEDURE — 99213 OFFICE O/P EST LOW 20 MIN: CPT | Performed by: INTERNAL MEDICINE

## 2017-07-26 PROCEDURE — 85025 COMPLETE CBC W/AUTO DIFF WBC: CPT | Performed by: INTERNAL MEDICINE

## 2017-07-26 NOTE — PROGRESS NOTES
Juan Manuel Joshua is a 76 y.o. female. Patient is here today for   Chief Complaint   Patient presents with   • Follow-up     CBC results           Vitals:    07/26/17 0920   BP: 124/58   Pulse: 74   Resp: 20     The following portions of the patient's history were reviewed and updated as appropriate: allergies, current medications, past family history, past medical history, past social history, past surgical history and problem list.    Past Medical History:   Diagnosis Date   • Depression    • Glaucoma    • Hyperglycemia    • Hyperlipidemia    • Hypertension    • Osteoarthritis       No Known Allergies   Social History     Social History   • Marital status:      Spouse name: N/A   • Number of children: N/A   • Years of education: N/A     Occupational History   • Not on file.     Social History Main Topics   • Smoking status: Former Smoker     Packs/day: 1.00     Years: 20.00     Types: Cigarettes     Quit date: 1987   • Smokeless tobacco: Not on file   • Alcohol use 1.2 oz/week     1 Glasses of wine, 1 Cans of beer per week   • Drug use: No   • Sexual activity: Defer     Other Topics Concern   • Not on file     Social History Narrative        Current Outpatient Prescriptions:   •  amLODIPine (NORVASC) 5 MG tablet, TAKE 1 TABLET BY MOUTH TWICE DAILY, Disp: 180 tablet, Rfl: 0  •  aspirin 81 MG EC tablet, Take 81 mg by mouth Daily., Disp: , Rfl:   •  atorvastatin (LIPITOR) 40 MG tablet, Take 40 mg by mouth Daily., Disp: , Rfl:   •  atorvastatin (LIPITOR) 40 MG tablet, TAKE 1 TABLET BY MOUTH EVERY DAY, Disp: 90 tablet, Rfl: 0  •  BESIVANCE 0.6 % suspension ophthalmic suspension, SHAKE LQ AND INT 1 GTT IN OD BID, Disp: , Rfl: 1  •  budesonide-formoterol (SYMBICORT) 80-4.5 MCG/ACT inhaler, Inhale 2 puffs 2 (Two) Times a Day., Disp: 10.2 g, Rfl: 5  •  fluticasone (FLONASE) 50 MCG/ACT nasal spray, 2 sprays into each nostril., Disp: , Rfl:   •  furosemide (LASIX) 20 MG tablet, TAKE 1 TABLET BY MOUTH EVERY  MORNING, Disp: 90 tablet, Rfl: 0  •  Glucosamine HCl (GLUCOSAMINE PO), Take 50 mg by mouth Daily., Disp: , Rfl:   •  IRON PO, Take  by mouth., Disp: , Rfl:   •  Multiple Vitamins-Minerals (EYE VITAMINS PO), Take  by mouth., Disp: , Rfl:   •  pantoprazole (PROTONIX) 40 MG EC tablet, Take 40 mg by mouth Daily., Disp: , Rfl:   •  pantoprazole (PROTONIX) 40 MG EC tablet, TAKE 1 TABLET BY MOUTH EVERY DAY, Disp: 90 tablet, Rfl: 0  •  sertraline (ZOLOFT) 25 MG tablet, Take 25 mg by mouth Daily., Disp: , Rfl:   •  sertraline (ZOLOFT) 25 MG tablet, TAKE 1 TABLET BY MOUTH EVERY DAY, Disp: 90 tablet, Rfl: 0  •  vitamin D (ERGOCALCIFEROL) 39326 UNITS capsule capsule, Take 50,000 Units by mouth Every 7 (Seven) Days., Disp: , Rfl:   •  vitamin D (ERGOCALCIFEROL) 11972 UNITS capsule capsule, TAKE 1 CAPSULE BY MOUTH EVERY WEEK, Disp: 12 capsule, Rfl: 0     Objective     History of Present Illness Estela is here today for a complete blood count as she was noted to be slightly anemic one month ago.  Her serum ferritin was low.  She started on iron.  She had a colonoscopy 4 years ago.  She does have symptoms of gastroesophageal reflux especially if she eats fried foods or spicy foods.  She takes Protonix 40 mg daily.  She also has hypertension, hyperlipidemia, and asthma.  She had a cardiovascular evaluation earlier this year as she was having exertional shortness of breath which included a cardiac catheterization.  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    Review of Systems   Constitutional: Negative for unexpected weight change.   Respiratory: Negative.    Cardiovascular: Negative for chest pain.   Gastrointestinal: Negative for abdominal pain and blood in stool.       Physical Exam   Constitutional: She appears well-developed and well-nourished.   Cardiovascular: Normal rate, regular rhythm and normal heart sounds.    Pulmonary/Chest: Effort normal and breath sounds normal.    Psychiatric: She has a normal mood and affect.   Vitals reviewed.      ASSESSMENT     Problem List Items Addressed This Visit        Digestive    GERD (gastroesophageal reflux disease) - Primary    Relevant Orders    Ambulatory Referral to Gastroenterology       Hematopoietic and Hemostatic    Anemia    Relevant Medications    IRON PO    Other Relevant Orders    POC CBC With / Auto Diff (Completed)    Ambulatory Referral to Gastroenterology          PLAN  Patient Instructions   Hemoglobin is normal today.  Continue iron for another month or 2.  Will refer to gastroenterology for endoscopy.    Return for Next scheduled follow up.

## 2017-07-26 NOTE — PATIENT INSTRUCTIONS
Hemoglobin is normal today.  Continue iron for another month or 2.  Will refer to gastroenterology for endoscopy.

## 2017-08-14 RX ORDER — ATORVASTATIN CALCIUM 40 MG/1
TABLET, FILM COATED ORAL
Qty: 90 TABLET | Refills: 1 | Status: SHIPPED | OUTPATIENT
Start: 2017-08-14 | End: 2018-01-10 | Stop reason: SDUPTHER

## 2017-08-14 RX ORDER — AMLODIPINE BESYLATE 5 MG/1
TABLET ORAL
Qty: 180 TABLET | Refills: 1 | Status: SHIPPED | OUTPATIENT
Start: 2017-08-14 | End: 2018-01-10 | Stop reason: SDUPTHER

## 2017-08-14 RX ORDER — SERTRALINE HYDROCHLORIDE 25 MG/1
TABLET, FILM COATED ORAL
Qty: 90 TABLET | Refills: 1 | Status: SHIPPED | OUTPATIENT
Start: 2017-08-14 | End: 2017-12-29 | Stop reason: SDUPTHER

## 2017-08-15 RX ORDER — DICLOFENAC SODIUM 75 MG/1
TABLET, DELAYED RELEASE ORAL
Qty: 180 TABLET | Refills: 0 | Status: SHIPPED | OUTPATIENT
Start: 2017-08-15 | End: 2017-11-12 | Stop reason: SDUPTHER

## 2017-08-15 RX ORDER — PANTOPRAZOLE SODIUM 40 MG/1
TABLET, DELAYED RELEASE ORAL
Qty: 90 TABLET | Refills: 0 | Status: SHIPPED | OUTPATIENT
Start: 2017-08-15 | End: 2017-08-17

## 2017-08-17 ENCOUNTER — OFFICE VISIT (OUTPATIENT)
Dept: GASTROENTEROLOGY | Facility: CLINIC | Age: 77
End: 2017-08-17

## 2017-08-17 VITALS
SYSTOLIC BLOOD PRESSURE: 118 MMHG | HEIGHT: 64 IN | DIASTOLIC BLOOD PRESSURE: 78 MMHG | BODY MASS INDEX: 29.71 KG/M2 | WEIGHT: 174 LBS | TEMPERATURE: 98.9 F

## 2017-08-17 DIAGNOSIS — D50.9 IRON DEFICIENCY ANEMIA, UNSPECIFIED IRON DEFICIENCY ANEMIA TYPE: Primary | ICD-10-CM

## 2017-08-17 DIAGNOSIS — K21.9 GASTROESOPHAGEAL REFLUX DISEASE, ESOPHAGITIS PRESENCE NOT SPECIFIED: ICD-10-CM

## 2017-08-17 PROCEDURE — 99204 OFFICE O/P NEW MOD 45 MIN: CPT | Performed by: INTERNAL MEDICINE

## 2017-08-17 RX ORDER — DEXLANSOPRAZOLE 60 MG/1
60 CAPSULE, DELAYED RELEASE ORAL DAILY
Qty: 15 CAPSULE | Refills: 0 | COMMUNITY
Start: 2017-08-17 | End: 2017-09-01

## 2017-08-17 NOTE — PROGRESS NOTES
Chief Complaint   Patient presents with   • Heartburn     Referral     Estela Joshua is a 76 y.o. female who presents with a History of iron deficiency anemia and symptomatic GERD   HPI     Patient 76-year-old female with history of hypertension hyperlipidemia hyperglycemia and GERD presenting for evaluation of iron deficiency anemia.  Patient presented with atypical chest pain and actually underwent cardiac catheterization for evaluation for which was determined she had heartburn.  Patient reports since that time his been on pantoprazole with no improvement.  Patient still waking up with severe heartburn has to limit her evening meals and avoid acidic foods.  Despite this still symptomatic.  Patient also with iron deficiency anemia with decreased iron saturation and ferritin for a year to year and a half patient denies melena or bright red blood per rectum.  Patient with no hematemesis no coffee ground emesis.  Patient also denies any sources of bleeding that she can report.  Patient reports no change in urine color no change in stool color as well.  Patient now for further recommendations.  Patient denies weight loss or abdominal pain.  Patient did have colonoscopy in 2012 showing diverticulosis but no other findings.  Was done for screening.    Past Medical History:   Diagnosis Date   • Depression    • GERD (gastroesophageal reflux disease)    • Glaucoma    • Hyperglycemia    • Hyperlipidemia    • Hypertension    • Osteoarthritis        Current Outpatient Prescriptions:   •  amLODIPine (NORVASC) 5 MG tablet, TAKE 1 TABLET BY MOUTH TWICE DAILY, Disp: 180 tablet, Rfl: 1  •  aspirin 81 MG EC tablet, Take 81 mg by mouth Daily., Disp: , Rfl:   •  atorvastatin (LIPITOR) 40 MG tablet, TAKE 1 TABLET BY MOUTH EVERY DAY, Disp: 90 tablet, Rfl: 1  •  BESIVANCE 0.6 % suspension ophthalmic suspension, SHAKE LQ AND INT 1 GTT IN OD BID, Disp: , Rfl: 1  •  diclofenac (VOLTAREN) 75 MG EC tablet, TAKE 1 TABLET BY MOUTH TWICE DAILY  WITH FOOD, Disp: 180 tablet, Rfl: 0  •  furosemide (LASIX) 20 MG tablet, TAKE 1 TABLET BY MOUTH EVERY MORNING, Disp: 90 tablet, Rfl: 0  •  Glucosamine HCl (GLUCOSAMINE PO), Take 50 mg by mouth Daily., Disp: , Rfl:   •  sertraline (ZOLOFT) 25 MG tablet, TAKE 1 TABLET BY MOUTH EVERY DAY, Disp: 90 tablet, Rfl: 1  •  vitamin D (ERGOCALCIFEROL) 21106 UNITS capsule capsule, Take 50,000 Units by mouth Every 7 (Seven) Days., Disp: , Rfl:   •  budesonide-formoterol (SYMBICORT) 80-4.5 MCG/ACT inhaler, Inhale 2 puffs 2 (Two) Times a Day., Disp: 10.2 g, Rfl: 5  •  dexlansoprazole (DEXILANT) 60 MG capsule, Take 1 capsule by mouth Daily for 15 days., Disp: 15 capsule, Rfl: 0  •  fluticasone (FLONASE) 50 MCG/ACT nasal spray, 2 sprays into each nostril., Disp: , Rfl:   •  IRON PO, Take  by mouth., Disp: , Rfl:   •  Multiple Vitamins-Minerals (EYE VITAMINS PO), Take  by mouth., Disp: , Rfl:   No Known Allergies  Social History     Social History   • Marital status:      Spouse name: N/A   • Number of children: N/A   • Years of education: N/A     Occupational History   • Not on file.     Social History Main Topics   • Smoking status: Former Smoker     Packs/day: 1.00     Years: 20.00     Types: Cigarettes     Quit date: 1987   • Smokeless tobacco: Not on file   • Alcohol use 1.2 oz/week     1 Glasses of wine, 1 Cans of beer per week   • Drug use: No   • Sexual activity: Defer     Other Topics Concern   • Not on file     Social History Narrative     Family History   Problem Relation Age of Onset   • Alzheimer's disease Mother    • Stroke Mother    • Heart failure Father      Review of Systems   Constitutional: Negative.    HENT: Negative.    Eyes: Negative.    Respiratory: Negative.    Cardiovascular: Positive for chest pain.   Gastrointestinal: Negative for abdominal distention, abdominal pain, blood in stool, constipation, diarrhea, nausea, rectal pain and vomiting.   Endocrine: Negative.    Musculoskeletal: Negative.     Allergic/Immunologic: Negative.    Hematological: Negative.      Vitals:    08/17/17 1101   BP: 118/78   Temp: 98.9 °F (37.2 °C)     Physical Exam   Constitutional: She is oriented to person, place, and time. She appears well-developed and well-nourished.   HENT:   Head: Normocephalic and atraumatic.   Eyes: Pupils are equal, round, and reactive to light. No scleral icterus.   Cardiovascular: Normal rate, regular rhythm and normal heart sounds.  Exam reveals no gallop and no friction rub.    No murmur heard.  Pulmonary/Chest: Effort normal and breath sounds normal. She has no wheezes. She has no rales.   Abdominal: Soft. Bowel sounds are normal. She exhibits no shifting dullness, no distension, no pulsatile liver, no fluid wave, no abdominal bruit, no ascites, no pulsatile midline mass and no mass. There is no hepatosplenomegaly. There is no tenderness. There is no rigidity and no guarding. No hernia.   Musculoskeletal: Normal range of motion. She exhibits no edema.   Neurological: She is alert and oriented to person, place, and time. No cranial nerve deficit.   Skin: Skin is warm and dry. No rash noted.   Psychiatric: She has a normal mood and affect. Her behavior is normal. Thought content normal.   Nursing note and vitals reviewed.    Diagnoses and all orders for this visit:    Iron deficiency anemia, unspecified iron deficiency anemia type  -     Case Request; Standing  -     Case Request    Gastroesophageal reflux disease, esophagitis presence not specified    Other orders  -     Implement Anesthesia orders day of procedure.; Standing  -     Obtain informed consent; Standing  -     dexlansoprazole (DEXILANT) 60 MG capsule; Take 1 capsule by mouth Daily for 15 days.    Patient is a 76-year-old female with history of hypertension hyperlipidemia hyperglycemia osteoarthritis and GERD presenting with iron deficiency anemia.  Patient noted with abnormal iron levels for proximally 120 Joshua years.  Patient with  persistently low iron saturation and low ferritin.  Patient reports no chelita blood per rectum but does have frequent heartburn.  Patient reports taking pantoprazole daily since noted with atypical chest pain and negative cardiac catheter.  Patient reports no significant improvement with the pantoprazole.  Patient status post colonoscopy 2012 was negative except for diverticulosis.  Patient now referred for further evaluation.  This point would recommend changing her proton pump inhibitor to see if we can get better control of her reflux, will give trial of Dexilant as we have samples and can adjust from there.  We will also arrange EGD colonoscopy to assess for bleeding site, if negative consider for small bowel capsule enteroscopy.  We'll follow-up based on the above findings and consider further intervention.

## 2017-09-01 ENCOUNTER — ANESTHESIA (OUTPATIENT)
Dept: GASTROENTEROLOGY | Facility: HOSPITAL | Age: 77
End: 2017-09-01

## 2017-09-01 ENCOUNTER — HOSPITAL ENCOUNTER (OUTPATIENT)
Facility: HOSPITAL | Age: 77
Setting detail: HOSPITAL OUTPATIENT SURGERY
Discharge: HOME | End: 2017-09-01
Attending: INTERNAL MEDICINE | Admitting: INTERNAL MEDICINE

## 2017-09-01 ENCOUNTER — ANESTHESIA EVENT (OUTPATIENT)
Dept: GASTROENTEROLOGY | Facility: HOSPITAL | Age: 77
End: 2017-09-01

## 2017-09-01 VITALS
HEIGHT: 65 IN | HEART RATE: 49 BPM | DIASTOLIC BLOOD PRESSURE: 70 MMHG | WEIGHT: 171.25 LBS | RESPIRATION RATE: 16 BRPM | BODY MASS INDEX: 28.53 KG/M2 | SYSTOLIC BLOOD PRESSURE: 121 MMHG | OXYGEN SATURATION: 99 % | TEMPERATURE: 98.1 F

## 2017-09-01 DIAGNOSIS — D50.9 IRON DEFICIENCY ANEMIA, UNSPECIFIED IRON DEFICIENCY ANEMIA TYPE: ICD-10-CM

## 2017-09-01 PROCEDURE — 88305 TISSUE EXAM BY PATHOLOGIST: CPT | Performed by: INTERNAL MEDICINE

## 2017-09-01 PROCEDURE — 45378 DIAGNOSTIC COLONOSCOPY: CPT | Performed by: INTERNAL MEDICINE

## 2017-09-01 PROCEDURE — 43239 EGD BIOPSY SINGLE/MULTIPLE: CPT | Performed by: INTERNAL MEDICINE

## 2017-09-01 PROCEDURE — 88312 SPECIAL STAINS GROUP 1: CPT | Performed by: INTERNAL MEDICINE

## 2017-09-01 PROCEDURE — 25010000002 PROPOFOL 10 MG/ML EMULSION: Performed by: ANESTHESIOLOGY

## 2017-09-01 RX ORDER — PROPOFOL 10 MG/ML
VIAL (ML) INTRAVENOUS AS NEEDED
Status: DISCONTINUED | OUTPATIENT
Start: 2017-09-01 | End: 2017-09-01 | Stop reason: SURG

## 2017-09-01 RX ORDER — SODIUM CHLORIDE, SODIUM LACTATE, POTASSIUM CHLORIDE, CALCIUM CHLORIDE 600; 310; 30; 20 MG/100ML; MG/100ML; MG/100ML; MG/100ML
1000 INJECTION, SOLUTION INTRAVENOUS CONTINUOUS PRN
Status: DISCONTINUED | OUTPATIENT
Start: 2017-09-01 | End: 2017-09-01 | Stop reason: HOSPADM

## 2017-09-01 RX ORDER — SODIUM CHLORIDE 0.9 % (FLUSH) 0.9 %
3 SYRINGE (ML) INJECTION AS NEEDED
Status: DISCONTINUED | OUTPATIENT
Start: 2017-09-01 | End: 2017-09-01 | Stop reason: HOSPADM

## 2017-09-01 RX ORDER — PROPOFOL 10 MG/ML
VIAL (ML) INTRAVENOUS CONTINUOUS PRN
Status: DISCONTINUED | OUTPATIENT
Start: 2017-09-01 | End: 2017-09-01 | Stop reason: SURG

## 2017-09-01 RX ORDER — DEXLANSOPRAZOLE 60 MG/1
60 CAPSULE, DELAYED RELEASE ORAL DAILY
Qty: 30 CAPSULE | Refills: 0 | Status: SHIPPED | OUTPATIENT
Start: 2017-09-01 | End: 2017-10-01

## 2017-09-01 RX ORDER — LIDOCAINE HYDROCHLORIDE 20 MG/ML
INJECTION, SOLUTION INFILTRATION; PERINEURAL AS NEEDED
Status: DISCONTINUED | OUTPATIENT
Start: 2017-09-01 | End: 2017-09-01 | Stop reason: SURG

## 2017-09-01 RX ORDER — DEXLANSOPRAZOLE 60 MG/1
60 CAPSULE, DELAYED RELEASE ORAL DAILY
Qty: 90 CAPSULE | Refills: 3 | Status: SHIPPED | OUTPATIENT
Start: 2017-09-01 | End: 2017-10-01

## 2017-09-01 RX ADMIN — SODIUM CHLORIDE, POTASSIUM CHLORIDE, SODIUM LACTATE AND CALCIUM CHLORIDE: 600; 310; 30; 20 INJECTION, SOLUTION INTRAVENOUS at 10:10

## 2017-09-01 RX ADMIN — LIDOCAINE HYDROCHLORIDE 60 MG: 20 INJECTION, SOLUTION INFILTRATION; PERINEURAL at 10:10

## 2017-09-01 RX ADMIN — PROPOFOL 100 MCG/KG/MIN: 10 INJECTION, EMULSION INTRAVENOUS at 10:10

## 2017-09-01 RX ADMIN — SODIUM CHLORIDE, POTASSIUM CHLORIDE, SODIUM LACTATE AND CALCIUM CHLORIDE 1000 ML: 600; 310; 30; 20 INJECTION, SOLUTION INTRAVENOUS at 09:49

## 2017-09-01 RX ADMIN — PROPOFOL 100 MG: 10 INJECTION, EMULSION INTRAVENOUS at 10:10

## 2017-09-01 NOTE — BRIEF OP NOTE
COLONOSCOPY, ESOPHAGOGASTRODUODENOSCOPY  Procedure Note    Estela Joshua  9/1/2017    Pre-op Diagnosis:   Iron deficiency anemia, unspecified iron deficiency anemia type [D50.9]    Post-op Diagnosis:     Post-Op Diagnosis Codes:     * Iron deficiency anemia, unspecified iron deficiency anemia type [D50.9]     * Diverticulosis [K57.90]     * Internal hemorrhoids [K64.8]     * Gastritis [K29.70]     * Hiatal hernia [K44.9]    Procedure/CPT® Codes:      Procedure(s):  COLONOSCOPY TO CECUM  ESOPHAGOGASTRODUODENOSCOPY WITH COLD BIOPSIES    Surgeon(s):  Siddhartha Mujica MD    Anesthesia: Monitor Anesthesia Care    Staff:   Endo Technician: Kennedi Farmer  Endo Nurse: Eliza Reyes RN    Estimated Blood Loss: *No blood loss documented*  Urine Voided: * No values recorded between 9/1/2017 10:07 AM and 9/1/2017 10:39 AM *    Specimens:                  ID Type Source Tests Collected by Time Destination   A : DUODENAL BIOPSIES R/O CELIAC Tissue Small Intestine, Duodenum TISSUE EXAM Siddhartha Mujica MD 9/1/2017 1036    B : GASTRIC ANTRUM Tissue Stomach TISSUE EXAM Siddhartha Mujica MD 9/1/2017 1038          Drains:    na       Findings: Diverticulosis  Internal hemorrhoids  Gastritis    Complications: None      Siddhartha Mujica MD     Date: 9/1/2017  Time: 10:42 AM

## 2017-09-01 NOTE — H&P (VIEW-ONLY)
Chief Complaint   Patient presents with   • Heartburn     Referral     Estela Joshua is a 76 y.o. female who presents with a History of iron deficiency anemia and symptomatic GERD   HPI     Patient 76-year-old female with history of hypertension hyperlipidemia hyperglycemia and GERD presenting for evaluation of iron deficiency anemia.  Patient presented with atypical chest pain and actually underwent cardiac catheterization for evaluation for which was determined she had heartburn.  Patient reports since that time his been on pantoprazole with no improvement.  Patient still waking up with severe heartburn has to limit her evening meals and avoid acidic foods.  Despite this still symptomatic.  Patient also with iron deficiency anemia with decreased iron saturation and ferritin for a year to year and a half patient denies melena or bright red blood per rectum.  Patient with no hematemesis no coffee ground emesis.  Patient also denies any sources of bleeding that she can report.  Patient reports no change in urine color no change in stool color as well.  Patient now for further recommendations.  Patient denies weight loss or abdominal pain.  Patient did have colonoscopy in 2012 showing diverticulosis but no other findings.  Was done for screening.    Past Medical History:   Diagnosis Date   • Depression    • GERD (gastroesophageal reflux disease)    • Glaucoma    • Hyperglycemia    • Hyperlipidemia    • Hypertension    • Osteoarthritis        Current Outpatient Prescriptions:   •  amLODIPine (NORVASC) 5 MG tablet, TAKE 1 TABLET BY MOUTH TWICE DAILY, Disp: 180 tablet, Rfl: 1  •  aspirin 81 MG EC tablet, Take 81 mg by mouth Daily., Disp: , Rfl:   •  atorvastatin (LIPITOR) 40 MG tablet, TAKE 1 TABLET BY MOUTH EVERY DAY, Disp: 90 tablet, Rfl: 1  •  BESIVANCE 0.6 % suspension ophthalmic suspension, SHAKE LQ AND INT 1 GTT IN OD BID, Disp: , Rfl: 1  •  diclofenac (VOLTAREN) 75 MG EC tablet, TAKE 1 TABLET BY MOUTH TWICE DAILY  WITH FOOD, Disp: 180 tablet, Rfl: 0  •  furosemide (LASIX) 20 MG tablet, TAKE 1 TABLET BY MOUTH EVERY MORNING, Disp: 90 tablet, Rfl: 0  •  Glucosamine HCl (GLUCOSAMINE PO), Take 50 mg by mouth Daily., Disp: , Rfl:   •  sertraline (ZOLOFT) 25 MG tablet, TAKE 1 TABLET BY MOUTH EVERY DAY, Disp: 90 tablet, Rfl: 1  •  vitamin D (ERGOCALCIFEROL) 69441 UNITS capsule capsule, Take 50,000 Units by mouth Every 7 (Seven) Days., Disp: , Rfl:   •  budesonide-formoterol (SYMBICORT) 80-4.5 MCG/ACT inhaler, Inhale 2 puffs 2 (Two) Times a Day., Disp: 10.2 g, Rfl: 5  •  dexlansoprazole (DEXILANT) 60 MG capsule, Take 1 capsule by mouth Daily for 15 days., Disp: 15 capsule, Rfl: 0  •  fluticasone (FLONASE) 50 MCG/ACT nasal spray, 2 sprays into each nostril., Disp: , Rfl:   •  IRON PO, Take  by mouth., Disp: , Rfl:   •  Multiple Vitamins-Minerals (EYE VITAMINS PO), Take  by mouth., Disp: , Rfl:   No Known Allergies  Social History     Social History   • Marital status:      Spouse name: N/A   • Number of children: N/A   • Years of education: N/A     Occupational History   • Not on file.     Social History Main Topics   • Smoking status: Former Smoker     Packs/day: 1.00     Years: 20.00     Types: Cigarettes     Quit date: 1987   • Smokeless tobacco: Not on file   • Alcohol use 1.2 oz/week     1 Glasses of wine, 1 Cans of beer per week   • Drug use: No   • Sexual activity: Defer     Other Topics Concern   • Not on file     Social History Narrative     Family History   Problem Relation Age of Onset   • Alzheimer's disease Mother    • Stroke Mother    • Heart failure Father      Review of Systems   Constitutional: Negative.    HENT: Negative.    Eyes: Negative.    Respiratory: Negative.    Cardiovascular: Positive for chest pain.   Gastrointestinal: Negative for abdominal distention, abdominal pain, blood in stool, constipation, diarrhea, nausea, rectal pain and vomiting.   Endocrine: Negative.    Musculoskeletal: Negative.     Allergic/Immunologic: Negative.    Hematological: Negative.      Vitals:    08/17/17 1101   BP: 118/78   Temp: 98.9 °F (37.2 °C)     Physical Exam   Constitutional: She is oriented to person, place, and time. She appears well-developed and well-nourished.   HENT:   Head: Normocephalic and atraumatic.   Eyes: Pupils are equal, round, and reactive to light. No scleral icterus.   Cardiovascular: Normal rate, regular rhythm and normal heart sounds.  Exam reveals no gallop and no friction rub.    No murmur heard.  Pulmonary/Chest: Effort normal and breath sounds normal. She has no wheezes. She has no rales.   Abdominal: Soft. Bowel sounds are normal. She exhibits no shifting dullness, no distension, no pulsatile liver, no fluid wave, no abdominal bruit, no ascites, no pulsatile midline mass and no mass. There is no hepatosplenomegaly. There is no tenderness. There is no rigidity and no guarding. No hernia.   Musculoskeletal: Normal range of motion. She exhibits no edema.   Neurological: She is alert and oriented to person, place, and time. No cranial nerve deficit.   Skin: Skin is warm and dry. No rash noted.   Psychiatric: She has a normal mood and affect. Her behavior is normal. Thought content normal.   Nursing note and vitals reviewed.    Diagnoses and all orders for this visit:    Iron deficiency anemia, unspecified iron deficiency anemia type  -     Case Request; Standing  -     Case Request    Gastroesophageal reflux disease, esophagitis presence not specified    Other orders  -     Implement Anesthesia orders day of procedure.; Standing  -     Obtain informed consent; Standing  -     dexlansoprazole (DEXILANT) 60 MG capsule; Take 1 capsule by mouth Daily for 15 days.    Patient is a 76-year-old female with history of hypertension hyperlipidemia hyperglycemia osteoarthritis and GERD presenting with iron deficiency anemia.  Patient noted with abnormal iron levels for proximally 120 Joshua years.  Patient with  persistently low iron saturation and low ferritin.  Patient reports no chelita blood per rectum but does have frequent heartburn.  Patient reports taking pantoprazole daily since noted with atypical chest pain and negative cardiac catheter.  Patient reports no significant improvement with the pantoprazole.  Patient status post colonoscopy 2012 was negative except for diverticulosis.  Patient now referred for further evaluation.  This point would recommend changing her proton pump inhibitor to see if we can get better control of her reflux, will give trial of Dexilant as we have samples and can adjust from there.  We will also arrange EGD colonoscopy to assess for bleeding site, if negative consider for small bowel capsule enteroscopy.  We'll follow-up based on the above findings and consider further intervention.

## 2017-09-01 NOTE — ANESTHESIA POSTPROCEDURE EVALUATION
Patient: Estela Joshua    Procedure Summary     Date Anesthesia Start Anesthesia Stop Room / Location    09/01/17 1009 1045  [facility] /  [facility]       Procedure Diagnosis Surgeon Provider    COLONOSCOPY TO CECUM (N/A ); ESOPHAGOGASTRODUODENOSCOPY WITH COLD BIOPSIES (N/A Esophagus) Iron deficiency anemia, unspecified iron deficiency anemia type; Diverticulosis; Internal hemorrhoids; Gastritis; Hiatal hernia  (Iron deficiency anemia, unspecified iron deficiency anemia type [D50.9]) MD Jose Carlos Strange MD          Anesthesia Type: MAC  Last vitals  BP   121/70 (09/01/17 1112)    Temp   36.7 °C (98.1 °F) (09/01/17 1105)    Pulse   (!) 49 (09/01/17 1112)   Resp   16 (09/01/17 1112)    SpO2   99 % (09/01/17 1112)      Post Anesthesia Care and Evaluation    Patient location during evaluation: PACU  Patient participation: complete - patient participated  Level of consciousness: awake and alert  Pain score: 0  Pain management: adequate  Airway patency: patent  Anesthetic complications: No anesthetic complications  PONV Status: none  Cardiovascular status: acceptable  Respiratory status: acceptable  Hydration status: acceptable

## 2017-09-01 NOTE — ANESTHESIA PREPROCEDURE EVALUATION
Anesthesia Evaluation     Patient summary reviewed and Nursing notes reviewed          Airway   Mallampati: I  TM distance: <3 FB  Neck ROM: full  no difficulty expected  Dental - normal exam     Pulmonary - normal exam   (+) COPD, shortness of breath,   Cardiovascular - normal exam    (+) hypertension, hyperlipidemia      Neuro/Psych  (+) psychiatric history,    GI/Hepatic/Renal/Endo    (+)  GERD,     Musculoskeletal     Abdominal  - normal exam    Bowel sounds: normal.   Substance History - negative use     OB/GYN negative ob/gyn ROS         Other   (+) arthritis                                     Anesthesia Plan    ASA 3     MAC     Anesthetic plan and risks discussed with patient.

## 2017-09-05 LAB
LAB AP CASE REPORT: NORMAL
Lab: NORMAL
PATH REPORT.FINAL DX SPEC: NORMAL
PATH REPORT.GROSS SPEC: NORMAL

## 2017-11-06 RX ORDER — FUROSEMIDE 20 MG/1
TABLET ORAL
Qty: 90 TABLET | Refills: 0 | Status: SHIPPED | OUTPATIENT
Start: 2017-11-06 | End: 2018-01-10 | Stop reason: SDUPTHER

## 2017-11-13 RX ORDER — DICLOFENAC SODIUM 75 MG/1
TABLET, DELAYED RELEASE ORAL
Qty: 180 TABLET | Refills: 0 | Status: SHIPPED | OUTPATIENT
Start: 2017-11-13 | End: 2017-12-29 | Stop reason: SDUPTHER

## 2017-12-05 RX ORDER — DILTIAZEM HYDROCHLORIDE 60 MG/1
TABLET, FILM COATED ORAL
Qty: 10.2 G | Refills: 0 | Status: SHIPPED | OUTPATIENT
Start: 2017-12-05 | End: 2017-12-30 | Stop reason: SDUPTHER

## 2017-12-29 RX ORDER — SERTRALINE HYDROCHLORIDE 25 MG/1
TABLET, FILM COATED ORAL
Qty: 90 TABLET | Refills: 0 | Status: SHIPPED | OUTPATIENT
Start: 2017-12-29 | End: 2018-01-25

## 2017-12-29 RX ORDER — DICLOFENAC SODIUM 75 MG/1
TABLET, DELAYED RELEASE ORAL
Qty: 180 TABLET | Refills: 0 | Status: SHIPPED | OUTPATIENT
Start: 2017-12-29 | End: 2018-07-12 | Stop reason: SDUPTHER

## 2018-01-02 RX ORDER — DILTIAZEM HYDROCHLORIDE 60 MG/1
TABLET, FILM COATED ORAL
Qty: 10.2 G | Refills: 0 | Status: SHIPPED | OUTPATIENT
Start: 2018-01-02 | End: 2018-01-10 | Stop reason: SDUPTHER

## 2018-01-10 RX ORDER — FUROSEMIDE 20 MG/1
20 TABLET ORAL DAILY
Qty: 90 TABLET | Refills: 1 | Status: SHIPPED | OUTPATIENT
Start: 2018-01-10 | End: 2018-07-12 | Stop reason: SDUPTHER

## 2018-01-10 RX ORDER — AMLODIPINE BESYLATE 5 MG/1
5 TABLET ORAL 2 TIMES DAILY
Qty: 180 TABLET | Refills: 1 | Status: SHIPPED | OUTPATIENT
Start: 2018-01-10 | End: 2018-07-12 | Stop reason: SDUPTHER

## 2018-01-10 RX ORDER — BUDESONIDE AND FORMOTEROL FUMARATE DIHYDRATE 80; 4.5 UG/1; UG/1
2 AEROSOL RESPIRATORY (INHALATION)
Qty: 1 INHALER | Refills: 5 | Status: SHIPPED | OUTPATIENT
Start: 2018-01-10 | End: 2019-06-03 | Stop reason: SDUPTHER

## 2018-01-10 RX ORDER — ATORVASTATIN CALCIUM 40 MG/1
40 TABLET, FILM COATED ORAL NIGHTLY
Qty: 90 TABLET | Refills: 1 | Status: SHIPPED | OUTPATIENT
Start: 2018-01-10 | End: 2018-07-12 | Stop reason: SDUPTHER

## 2018-01-17 DIAGNOSIS — E55.9 VITAMIN D DEFICIENCY: ICD-10-CM

## 2018-01-17 DIAGNOSIS — E78.5 HYPERLIPIDEMIA, UNSPECIFIED HYPERLIPIDEMIA TYPE: ICD-10-CM

## 2018-01-17 DIAGNOSIS — I10 ESSENTIAL HYPERTENSION: ICD-10-CM

## 2018-01-19 LAB
25(OH)D3+25(OH)D2 SERPL-MCNC: 17.4 NG/ML (ref 30–100)
ALBUMIN SERPL-MCNC: 4.1 G/DL (ref 3.5–5.2)
ALBUMIN/GLOB SERPL: 1.5 G/DL
ALP SERPL-CCNC: 78 U/L (ref 39–117)
ALT SERPL-CCNC: 21 U/L (ref 1–33)
AST SERPL-CCNC: 17 U/L (ref 1–32)
BASOPHILS # BLD AUTO: 0.02 10*3/MM3 (ref 0–0.2)
BASOPHILS NFR BLD AUTO: 0.3 % (ref 0–1.5)
BILIRUB SERPL-MCNC: 0.3 MG/DL (ref 0.1–1.2)
BUN SERPL-MCNC: 16 MG/DL (ref 8–23)
BUN/CREAT SERPL: 19.3 (ref 7–25)
CALCIUM SERPL-MCNC: 9.3 MG/DL (ref 8.6–10.5)
CHLORIDE SERPL-SCNC: 106 MMOL/L (ref 98–107)
CHOLEST SERPL-MCNC: 172 MG/DL (ref 0–200)
CO2 SERPL-SCNC: 28.2 MMOL/L (ref 22–29)
CREAT SERPL-MCNC: 0.83 MG/DL (ref 0.57–1)
EOSINOPHIL # BLD AUTO: 0.12 10*3/MM3 (ref 0–0.7)
EOSINOPHIL NFR BLD AUTO: 1.5 % (ref 0.3–6.2)
ERYTHROCYTE [DISTWIDTH] IN BLOOD BY AUTOMATED COUNT: 13.2 % (ref 11.7–13)
GLOBULIN SER CALC-MCNC: 2.8 GM/DL
GLUCOSE SERPL-MCNC: 112 MG/DL (ref 65–99)
HCT VFR BLD AUTO: 42.1 % (ref 35.6–45.5)
HDLC SERPL-MCNC: 42 MG/DL (ref 40–60)
HGB BLD-MCNC: 13.5 G/DL (ref 11.9–15.5)
IMM GRANULOCYTES # BLD: 0.02 10*3/MM3 (ref 0–0.03)
IMM GRANULOCYTES NFR BLD: 0.3 % (ref 0–0.5)
LDLC SERPL CALC-MCNC: 92 MG/DL (ref 0–100)
LDLC/HDLC SERPL: 2.2 {RATIO}
LYMPHOCYTES # BLD AUTO: 1.94 10*3/MM3 (ref 0.9–4.8)
LYMPHOCYTES NFR BLD AUTO: 24.6 % (ref 19.6–45.3)
MCH RBC QN AUTO: 31.3 PG (ref 26.9–32)
MCHC RBC AUTO-ENTMCNC: 32.1 G/DL (ref 32.4–36.3)
MCV RBC AUTO: 97.7 FL (ref 80.5–98.2)
MONOCYTES # BLD AUTO: 0.57 10*3/MM3 (ref 0.2–1.2)
MONOCYTES NFR BLD AUTO: 7.2 % (ref 5–12)
NEUTROPHILS # BLD AUTO: 5.23 10*3/MM3 (ref 1.9–8.1)
NEUTROPHILS NFR BLD AUTO: 66.1 % (ref 42.7–76)
PLATELET # BLD AUTO: 217 10*3/MM3 (ref 140–500)
POTASSIUM SERPL-SCNC: 4.3 MMOL/L (ref 3.5–5.2)
PROT SERPL-MCNC: 6.9 G/DL (ref 6–8.5)
RBC # BLD AUTO: 4.31 10*6/MM3 (ref 3.9–5.2)
SODIUM SERPL-SCNC: 147 MMOL/L (ref 136–145)
TRIGL SERPL-MCNC: 189 MG/DL (ref 0–150)
VLDLC SERPL CALC-MCNC: 37.8 MG/DL (ref 5–40)
WBC # BLD AUTO: 7.9 10*3/MM3 (ref 4.5–10.7)

## 2018-01-25 ENCOUNTER — OFFICE VISIT (OUTPATIENT)
Dept: FAMILY MEDICINE CLINIC | Facility: CLINIC | Age: 78
End: 2018-01-25

## 2018-01-25 VITALS
HEART RATE: 80 BPM | DIASTOLIC BLOOD PRESSURE: 74 MMHG | BODY MASS INDEX: 29.16 KG/M2 | RESPIRATION RATE: 16 BRPM | SYSTOLIC BLOOD PRESSURE: 130 MMHG | WEIGHT: 175 LBS | HEIGHT: 65 IN

## 2018-01-25 DIAGNOSIS — E78.5 HYPERLIPIDEMIA, UNSPECIFIED HYPERLIPIDEMIA TYPE: Primary | ICD-10-CM

## 2018-01-25 DIAGNOSIS — R73.9 HYPERGLYCEMIA: ICD-10-CM

## 2018-01-25 DIAGNOSIS — K21.9 GASTROESOPHAGEAL REFLUX DISEASE, ESOPHAGITIS PRESENCE NOT SPECIFIED: ICD-10-CM

## 2018-01-25 DIAGNOSIS — F32.89 OTHER DEPRESSION: ICD-10-CM

## 2018-01-25 DIAGNOSIS — I10 ESSENTIAL HYPERTENSION: ICD-10-CM

## 2018-01-25 DIAGNOSIS — E55.9 VITAMIN D DEFICIENCY: ICD-10-CM

## 2018-01-25 LAB — HBA1C MFR BLD: 5.5 %

## 2018-01-25 PROCEDURE — 99214 OFFICE O/P EST MOD 30 MIN: CPT | Performed by: INTERNAL MEDICINE

## 2018-01-25 PROCEDURE — 83036 HEMOGLOBIN GLYCOSYLATED A1C: CPT | Performed by: INTERNAL MEDICINE

## 2018-01-25 RX ORDER — PANTOPRAZOLE SODIUM 40 MG/1
40 TABLET, DELAYED RELEASE ORAL DAILY
Qty: 90 TABLET | Refills: 3 | Status: SHIPPED | OUTPATIENT
Start: 2018-01-25 | End: 2019-01-28 | Stop reason: SDUPTHER

## 2018-01-25 RX ORDER — DEXLANSOPRAZOLE 60 MG/1
CAPSULE, DELAYED RELEASE ORAL
Refills: 3 | COMMUNITY
Start: 2017-12-29 | End: 2018-01-25

## 2018-01-25 RX ORDER — ERGOCALCIFEROL 1.25 MG/1
50000 CAPSULE ORAL WEEKLY
Qty: 30 CAPSULE | Refills: 0 | Status: SHIPPED | OUTPATIENT
Start: 2018-01-25 | End: 2018-07-16 | Stop reason: SDUPTHER

## 2018-01-25 RX ORDER — SERTRALINE HYDROCHLORIDE 100 MG/1
100 TABLET, FILM COATED ORAL DAILY
Qty: 90 TABLET | Refills: 3 | Status: SHIPPED | OUTPATIENT
Start: 2018-01-25 | End: 2019-03-15 | Stop reason: SDUPTHER

## 2018-01-25 NOTE — PROGRESS NOTES
Juan Manuel Joshua is a 77 y.o. female. Patient is here today for   Chief Complaint   Patient presents with   • Hyperlipidemia   • Hypertension   • Vitamin D Deficiency          Vitals:    01/25/18 1532   BP: 130/74   Pulse: 80   Resp: 16       The following portions of the patient's history were reviewed and updated as appropriate: allergies, current medications, past family history, past medical history, past social history, past surgical history and problem list.    Past Medical History:   Diagnosis Date   • COPD (chronic obstructive pulmonary disease)    • Depression    • GERD (gastroesophageal reflux disease)    • Hyperglycemia    • Hyperlipidemia    • Hypertension    • Macular degeneration    • Osteoarthritis       No Known Allergies   Social History     Social History   • Marital status:      Spouse name: N/A   • Number of children: N/A   • Years of education: N/A     Occupational History   • Not on file.     Social History Main Topics   • Smoking status: Former Smoker     Packs/day: 1.00     Years: 20.00     Types: Cigarettes     Quit date: 1987   • Smokeless tobacco: Never Used   • Alcohol use 1.2 oz/week     1 Glasses of wine, 1 Cans of beer per week   • Drug use: No   • Sexual activity: Defer     Other Topics Concern   • Not on file     Social History Narrative        Current Outpatient Prescriptions:   •  amLODIPine (NORVASC) 5 MG tablet, Take 1 tablet by mouth 2 (Two) Times a Day., Disp: 180 tablet, Rfl: 1  •  aspirin 81 MG EC tablet, Take 81 mg by mouth Daily., Disp: , Rfl:   •  atorvastatin (LIPITOR) 40 MG tablet, Take 1 tablet by mouth Every Night., Disp: 90 tablet, Rfl: 1  •  BESIVANCE 0.6 % suspension ophthalmic suspension, SHAKE LQ AND INT 1 GTT IN OD BID, Disp: , Rfl: 1  •  budesonide-formoterol (SYMBICORT) 80-4.5 MCG/ACT inhaler, Inhale 2 puffs 2 (Two) Times a Day., Disp: 1 inhaler, Rfl: 5  •  diclofenac (VOLTAREN) 75 MG EC tablet, TAKE 1 TABLET BY MOUTH TWICE DAILY WITH FOOD,  Disp: 180 tablet, Rfl: 0  •  fluticasone (FLONASE) 50 MCG/ACT nasal spray, 2 sprays into each nostril., Disp: , Rfl:   •  furosemide (LASIX) 20 MG tablet, Take 1 tablet by mouth Daily., Disp: 90 tablet, Rfl: 1  •  Glucosamine HCl (GLUCOSAMINE PO), Take 50 mg by mouth Daily., Disp: , Rfl:   •  IRON PO, Take  by mouth., Disp: , Rfl:   •  Multiple Vitamins-Minerals (EYE VITAMINS PO), Take  by mouth., Disp: , Rfl:   •  vitamin D (ERGOCALCIFEROL) 53719 UNITS capsule capsule, Take 50,000 Units by mouth Every 7 (Seven) Days., Disp: , Rfl:   •  pantoprazole (PROTONIX) 40 MG EC tablet, Take 1 tablet by mouth Daily., Disp: 90 tablet, Rfl: 3  •  sertraline (ZOLOFT) 100 MG tablet, Take 1 tablet by mouth Daily., Disp: 90 tablet, Rfl: 3  •  vitamin D (ERGOCALCIFEROL) 12727 units capsule capsule, Take 1 capsule by mouth 1 (One) Time Per Week., Disp: 30 capsule, Rfl: 0     Objective   History of Present Illness Estela is here for a blood pressure and lab follow-up.  She has hypertension, hyperlipidemia, elevated fasting blood sugar, depression, and iron deficiency anemia.  She underwent an EGD and colonoscopy last September.  Biopsy showed some chronic gastritis.  She has been on Dexilant 60 mg daily but needs to switch to something his insurance will not pay for it.  She also states that she has been more depressed lately and would like to have sertraline increased.  She eats healthy but does not exercise.  She does monitor her blood pressure.    Review of Systems   Constitutional: Negative for activity change and unexpected weight change.   Respiratory: Negative.    Cardiovascular:        /<80   Gastrointestinal:        Colonoscopy last year   Genitourinary: Negative.    Neurological: Negative.    Psychiatric/Behavioral:        Stress       Physical Exam   Constitutional: She appears well-developed and well-nourished.   Neck: Carotid bruit is not present.   Cardiovascular: Normal rate, regular rhythm and normal heart  sounds.    110/60   Pulmonary/Chest: Effort normal and breath sounds normal.   Musculoskeletal: She exhibits no edema.   Neurological: She is alert.   Psychiatric: She has a normal mood and affect.   Vitals reviewed.      ASSESSMENT     Problem List Items Addressed This Visit        Cardiovascular and Mediastinum    Hyperlipidemia - Primary    Hypertension       Digestive    Vitamin D deficiency    GERD (gastroesophageal reflux disease)    Relevant Medications    pantoprazole (PROTONIX) 40 MG EC tablet       Other    Depression    Relevant Medications    sertraline (ZOLOFT) 100 MG tablet    Hyperglycemia    Relevant Orders    POC Glycated Hemoglobin, Total (Completed)          PLAN  Patient Instructions   Blood pressure is normal today.  Fasting blood sugar is mildly elevated at 112 and hemoglobin A1c is normal at 5.5.  Total cholesterol is 172 and triglycerides are mildly elevated at 189.  HDL is 42 and LDL is Slated at 92.  Vitamin D is low at 17.4.  Discussed starting an exercise program.  Will increase sertraline to 50 mg and increase to 100 mg if indicated.  Take pantoprazole 40 mg one half-hour before breakfast.      Return in about 6 months (around 7/25/2018) for labsbmp,lipid,a1c,vit d.

## 2018-01-25 NOTE — PATIENT INSTRUCTIONS
Blood pressure is normal today.  Fasting blood sugar is mildly elevated at 112 and hemoglobin A1c is normal at 5.5.  Total cholesterol is 172 and triglycerides are mildly elevated at 189.  HDL is 42 and LDL is Slated at 92.  Vitamin D is low at 17.4.  Discussed starting an exercise program.  Will increase sertraline to 50 mg and increase to 100 mg if indicated.  Take pantoprazole 40 mg one half-hour before breakfast.

## 2018-04-04 ENCOUNTER — OFFICE VISIT (OUTPATIENT)
Dept: FAMILY MEDICINE CLINIC | Facility: CLINIC | Age: 78
End: 2018-04-04

## 2018-04-04 VITALS
SYSTOLIC BLOOD PRESSURE: 112 MMHG | WEIGHT: 177.8 LBS | DIASTOLIC BLOOD PRESSURE: 68 MMHG | BODY MASS INDEX: 30.35 KG/M2 | HEART RATE: 58 BPM | OXYGEN SATURATION: 95 % | TEMPERATURE: 97.7 F | HEIGHT: 64 IN

## 2018-04-04 DIAGNOSIS — Z12.31 SCREENING MAMMOGRAM, ENCOUNTER FOR: ICD-10-CM

## 2018-04-04 DIAGNOSIS — Z00.00 MEDICARE ANNUAL WELLNESS VISIT, SUBSEQUENT: Primary | ICD-10-CM

## 2018-04-04 PROCEDURE — 96160 PT-FOCUSED HLTH RISK ASSMT: CPT | Performed by: NURSE PRACTITIONER

## 2018-04-04 PROCEDURE — G0439 PPPS, SUBSEQ VISIT: HCPCS | Performed by: NURSE PRACTITIONER

## 2018-04-04 RX ORDER — FUROSEMIDE 20 MG/1
TABLET ORAL
COMMUNITY
Start: 2018-01-18 | End: 2018-04-04 | Stop reason: SDUPTHER

## 2018-04-04 NOTE — PROGRESS NOTES
QUICK REFERENCE INFORMATION:  The ABCs of the Annual Wellness Visit    Subsequent Medicare Wellness Visit    HEALTH RISK ASSESSMENT    1940    Recent Hospitalizations:  No hospitalization(s) within the last year..        Current Medical Providers:  Patient Care Team:  Robert Miner MD as PCP - General  Robert Miner MD as PCP - Family Medicine        Smoking Status:  History   Smoking Status   • Former Smoker   • Packs/day: 1.00   • Years: 20.00   • Types: Cigarettes   • Quit date: 1987   Smokeless Tobacco   • Never Used       Alcohol Consumption:  History   Alcohol Use   • 1.2 oz/week   • 1 Glasses of wine, 1 Cans of beer per week       Depression Screen:   PHQ-2/PHQ-9 Depression Screening 4/4/2018   Little interest or pleasure in doing things 0   Feeling down, depressed, or hopeless 0   Total Score 0       Health Habits and Functional and Cognitive Screening:  Functional & Cognitive Status 4/4/2018   Do you have difficulty preparing food and eating? No   Do you have difficulty bathing yourself, getting dressed or grooming yourself? No   Do you have difficulty using the toilet? No   Do you have difficulty moving around from place to place? No   Do you have trouble with steps or getting out of a bed or a chair? No   In the past year have you fallen or experienced a near fall? No   Current Diet Well Balanced Diet   Dental Exam Not up to date   Eye Exam Up to date   Exercise (times per week) 0 times per week   Current Exercise Activities Include None   Do you need help using the phone?  No   Are you deaf or do you have serious difficulty hearing?  Yes   Do you need help with transportation? No   Do you need help shopping? No   Do you need help preparing meals?  No   Do you need help with housework?  No   Do you need help with laundry? No   Do you need help taking your medications? No   Do you need help managing money? No   Do you ever drive or ride in a car without wearing a seat belt? No   Have you felt  unusual stress, anger or loneliness in the last month? No   Who do you live with? Child   If you need help, do you have trouble finding someone available to you? No   Have you been bothered in the last four weeks by sexual problems? No   Do you have difficulty concentrating, remembering or making decisions? No           Does the patient have evidence of cognitive impairment? No    Aspirin use counseling: Taking ASA appropriately as indicated      Recent Lab Results:  CMP:  Lab Results   Component Value Date     (H) 01/19/2018    BUN 16 01/19/2018    CREATININE 0.83 01/19/2018    EGFRIFNONA 67 01/19/2018    EGFRIFAFRI 81 01/19/2018    BCR 19.3 01/19/2018     (H) 01/19/2018    K 4.3 01/19/2018    CO2 28.2 01/19/2018    CALCIUM 9.3 01/19/2018    PROTENTOTREF 6.9 01/19/2018    ALBUMIN 4.10 01/19/2018    LABGLOBREF 2.8 01/19/2018    LABIL2 1.5 01/19/2018    BILITOT 0.3 01/19/2018    ALKPHOS 78 01/19/2018    AST 17 01/19/2018    ALT 21 01/19/2018     Lipid Panel:  Lab Results   Component Value Date    TRIG 189 (H) 01/19/2018    HDL 42 01/19/2018    VLDL 37.8 01/19/2018    LDLHDL 2.20 01/19/2018     HbA1c:  Lab Results   Component Value Date    HGBA1C 5.5 01/25/2018       Age-appropriate Screening Schedule:  Refer to the list below for future screening recommendations based on patient's age, sex and/or medical conditions. Orders for these recommended tests are listed in the plan section. The patient has been provided with a written plan.    Health Maintenance   Topic Date Due   • TDAP/TD VACCINES (1 - Tdap) 11/17/1959   • PNEUMOCOCCAL VACCINES (65+ LOW/MEDIUM RISK) (1 of 2 - PCV13) 11/17/2005   • MAMMOGRAM  06/15/2016   • LIPID PANEL  01/19/2019   • COLONOSCOPY  09/01/2027   • INFLUENZA VACCINE  Completed   • ZOSTER VACCINE  Completed        Subjective   History of Present Illness    Estela Joshua is a 77 y.o. female who presents for an Subsequent Wellness Visit.    The following portions of the patient's  history were reviewed and updated as appropriate: allergies, current medications, past family history, past medical history, past social history, past surgical history and problem list.    Outpatient Medications Prior to Visit   Medication Sig Dispense Refill   • amLODIPine (NORVASC) 5 MG tablet Take 1 tablet by mouth 2 (Two) Times a Day. 180 tablet 1   • aspirin 81 MG EC tablet Take 81 mg by mouth Daily.     • atorvastatin (LIPITOR) 40 MG tablet Take 1 tablet by mouth Every Night. 90 tablet 1   • BESIVANCE 0.6 % suspension ophthalmic suspension SHAKE LQ AND INT 1 GTT IN OD BID  1   • budesonide-formoterol (SYMBICORT) 80-4.5 MCG/ACT inhaler Inhale 2 puffs 2 (Two) Times a Day. 1 inhaler 5   • diclofenac (VOLTAREN) 75 MG EC tablet TAKE 1 TABLET BY MOUTH TWICE DAILY WITH FOOD 180 tablet 0   • fluticasone (FLONASE) 50 MCG/ACT nasal spray 2 sprays into each nostril.     • furosemide (LASIX) 20 MG tablet Take 1 tablet by mouth Daily. 90 tablet 1   • Glucosamine HCl (GLUCOSAMINE PO) Take 50 mg by mouth Daily.     • IRON PO Take  by mouth.     • Multiple Vitamins-Minerals (EYE VITAMINS PO) Take  by mouth.     • pantoprazole (PROTONIX) 40 MG EC tablet Take 1 tablet by mouth Daily. 90 tablet 3   • sertraline (ZOLOFT) 100 MG tablet Take 1 tablet by mouth Daily. 90 tablet 3   • vitamin D (ERGOCALCIFEROL) 75731 UNITS capsule capsule Take 50,000 Units by mouth Every 7 (Seven) Days.     • vitamin D (ERGOCALCIFEROL) 92675 units capsule capsule Take 1 capsule by mouth 1 (One) Time Per Week. 30 capsule 0     No facility-administered medications prior to visit.        Patient Active Problem List   Diagnosis   • Depression   • Impaired fasting glucose   • Hyperglycemia   • Hyperlipidemia   • Hypertension   • Visual disturbance   • Vitamin D deficiency   • Anemia   • Imbalance   • Impacted cerumen of right ear   • Shortness of breath on exertion   • Edema   • Restrictive lung disease   • Arthralgia   • GERD (gastroesophageal reflux  "disease)   • Iron deficiency anemia       Advance Care Planning:  has an advance directive - a copy HAS NOT been provided. Have asked the patient to send this to us to add to record.    Identification of Risk Factors:  Risk factors include: cardiovascular risk and increased fall risk.    Review of Systems    Compared to one year ago, the patient feels her physical health is the same.  Compared to one year ago, the patient feels her mental health is the same.    Objective     Physical Exam    Vitals:    04/04/18 0836   BP: 112/68   BP Location: Left arm   Patient Position: Sitting   Cuff Size: Adult   Pulse: 58   Temp: 97.7 °F (36.5 °C)   TempSrc: Oral   SpO2: 95%   Weight: 80.6 kg (177 lb 12.8 oz)   Height: 163.8 cm (64.49\")   PainSc: 0-No pain       Body mass index is 30.06 kg/m².  Discussed the patient's BMI with her. BMI is above normal parameters. Follow-up plan includes:  no follow-up required.    Assessment/Plan   Patient Self-Management and Personalized Health Advice  The patient has been provided with information about: diet, exercise, weight management, prevention of cardiac or vascular disease and fall prevention and preventive services including:   · she had prevnar and pneumovax at the pharmacy, she will get the records from [employer].    Visit Diagnoses:    ICD-10-CM ICD-9-CM   1. Medicare annual wellness visit, subsequent Z00.00 V70.0   2. Screening mammogram, encounter for Z12.31 V76.12       Orders Placed This Encounter   Procedures   • Mammo Screening Bilateral With CAD     Scheduling Instructions:      Prefer [place]     Order Specific Question:   Reason for Exam:     Answer:   Screening mammogram       Outpatient Encounter Prescriptions as of 4/4/2018   Medication Sig Dispense Refill   • amLODIPine (NORVASC) 5 MG tablet Take 1 tablet by mouth 2 (Two) Times a Day. 180 tablet 1   • aspirin 81 MG EC tablet Take 81 mg by mouth Daily.     • atorvastatin (LIPITOR) 40 MG tablet Take 1 tablet by mouth " Every Night. 90 tablet 1   • BESIVANCE 0.6 % suspension ophthalmic suspension SHAKE LQ AND INT 1 GTT IN OD BID  1   • budesonide-formoterol (SYMBICORT) 80-4.5 MCG/ACT inhaler Inhale 2 puffs 2 (Two) Times a Day. 1 inhaler 5   • diclofenac (VOLTAREN) 75 MG EC tablet TAKE 1 TABLET BY MOUTH TWICE DAILY WITH FOOD 180 tablet 0   • fluticasone (FLONASE) 50 MCG/ACT nasal spray 2 sprays into each nostril.     • furosemide (LASIX) 20 MG tablet Take 1 tablet by mouth Daily. 90 tablet 1   • Glucosamine HCl (GLUCOSAMINE PO) Take 50 mg by mouth Daily.     • IRON PO Take  by mouth.     • Multiple Vitamins-Minerals (EYE VITAMINS PO) Take  by mouth.     • pantoprazole (PROTONIX) 40 MG EC tablet Take 1 tablet by mouth Daily. 90 tablet 3   • sertraline (ZOLOFT) 100 MG tablet Take 1 tablet by mouth Daily. 90 tablet 3   • vitamin D (ERGOCALCIFEROL) 43933 UNITS capsule capsule Take 50,000 Units by mouth Every 7 (Seven) Days.     • vitamin D (ERGOCALCIFEROL) 55023 units capsule capsule Take 1 capsule by mouth 1 (One) Time Per Week. 30 capsule 0   • [DISCONTINUED] furosemide (LASIX) 20 MG tablet        No facility-administered encounter medications on file as of 4/4/2018.        Reviewed use of high risk medication in the elderly: yes  Reviewed for potential of harmful drug interactions in the elderly: yes    Follow Up:  No Follow-up on file.     An After Visit Summary and PPPS with all of these plans were given to the patient.

## 2018-07-12 RX ORDER — ATORVASTATIN CALCIUM 40 MG/1
TABLET, FILM COATED ORAL
Qty: 90 TABLET | Refills: 1 | Status: SHIPPED | OUTPATIENT
Start: 2018-07-12 | End: 2019-03-15 | Stop reason: SDUPTHER

## 2018-07-12 RX ORDER — AMLODIPINE BESYLATE 5 MG/1
TABLET ORAL
Qty: 180 TABLET | Refills: 1 | Status: SHIPPED | OUTPATIENT
Start: 2018-07-12 | End: 2019-04-08 | Stop reason: SDUPTHER

## 2018-07-12 RX ORDER — FUROSEMIDE 20 MG/1
TABLET ORAL
Qty: 90 TABLET | Refills: 1 | Status: SHIPPED | OUTPATIENT
Start: 2018-07-12 | End: 2020-07-03 | Stop reason: SDUPTHER

## 2018-07-12 RX ORDER — DICLOFENAC SODIUM 75 MG/1
TABLET, DELAYED RELEASE ORAL
Qty: 180 TABLET | Refills: 0 | Status: SHIPPED | OUTPATIENT
Start: 2018-07-12 | End: 2018-07-31 | Stop reason: SDUPTHER

## 2018-07-16 RX ORDER — ERGOCALCIFEROL 1.25 MG/1
50000 CAPSULE ORAL
Qty: 12 CAPSULE | Refills: 1 | Status: SHIPPED | OUTPATIENT
Start: 2018-07-16 | End: 2019-01-28 | Stop reason: SDUPTHER

## 2018-07-20 DIAGNOSIS — E55.9 VITAMIN D DEFICIENCY: ICD-10-CM

## 2018-07-20 DIAGNOSIS — R73.9 HYPERGLYCEMIA: ICD-10-CM

## 2018-07-20 DIAGNOSIS — E78.5 HYPERLIPIDEMIA, UNSPECIFIED HYPERLIPIDEMIA TYPE: ICD-10-CM

## 2018-07-25 LAB
25(OH)D3+25(OH)D2 SERPL-MCNC: 25.7 NG/ML (ref 30–100)
BUN SERPL-MCNC: 17 MG/DL (ref 8–23)
BUN/CREAT SERPL: 20 (ref 7–25)
CALCIUM SERPL-MCNC: 9.4 MG/DL (ref 8.6–10.5)
CHLORIDE SERPL-SCNC: 108 MMOL/L (ref 98–107)
CHOLEST SERPL-MCNC: 158 MG/DL (ref 0–200)
CO2 SERPL-SCNC: 27.3 MMOL/L (ref 22–29)
CREAT SERPL-MCNC: 0.85 MG/DL (ref 0.57–1)
GLUCOSE SERPL-MCNC: 94 MG/DL (ref 65–99)
HBA1C MFR BLD: 5.55 % (ref 4.8–5.6)
HDLC SERPL-MCNC: 47 MG/DL (ref 40–60)
LDLC SERPL CALC-MCNC: 86 MG/DL (ref 0–100)
LDLC/HDLC SERPL: 1.84 {RATIO}
POTASSIUM SERPL-SCNC: 5 MMOL/L (ref 3.5–5.2)
SODIUM SERPL-SCNC: 147 MMOL/L (ref 136–145)
TRIGL SERPL-MCNC: 123 MG/DL (ref 0–150)
VLDLC SERPL CALC-MCNC: 24.6 MG/DL (ref 5–40)

## 2018-07-31 RX ORDER — DICLOFENAC SODIUM 75 MG/1
75 TABLET, DELAYED RELEASE ORAL 2 TIMES DAILY WITH MEALS
Qty: 180 TABLET | Refills: 0 | Status: SHIPPED | OUTPATIENT
Start: 2018-07-31 | End: 2019-03-18 | Stop reason: SDUPTHER

## 2018-08-01 ENCOUNTER — OFFICE VISIT (OUTPATIENT)
Dept: FAMILY MEDICINE CLINIC | Facility: CLINIC | Age: 78
End: 2018-08-01

## 2018-08-01 VITALS
DIASTOLIC BLOOD PRESSURE: 68 MMHG | RESPIRATION RATE: 16 BRPM | BODY MASS INDEX: 29.53 KG/M2 | HEART RATE: 53 BPM | SYSTOLIC BLOOD PRESSURE: 124 MMHG | WEIGHT: 173 LBS | HEIGHT: 64 IN

## 2018-08-01 DIAGNOSIS — F32.89 OTHER DEPRESSION: ICD-10-CM

## 2018-08-01 DIAGNOSIS — K21.9 GASTROESOPHAGEAL REFLUX DISEASE, ESOPHAGITIS PRESENCE NOT SPECIFIED: ICD-10-CM

## 2018-08-01 DIAGNOSIS — I10 ESSENTIAL HYPERTENSION: ICD-10-CM

## 2018-08-01 DIAGNOSIS — M17.11 ARTHRITIS OF KNEE, RIGHT: ICD-10-CM

## 2018-08-01 DIAGNOSIS — E78.5 HYPERLIPIDEMIA, UNSPECIFIED HYPERLIPIDEMIA TYPE: Primary | ICD-10-CM

## 2018-08-01 DIAGNOSIS — J98.4 RESTRICTIVE LUNG DISEASE: ICD-10-CM

## 2018-08-01 DIAGNOSIS — E55.9 VITAMIN D DEFICIENCY: ICD-10-CM

## 2018-08-01 DIAGNOSIS — M17.12 ARTHRITIS OF LEFT KNEE: ICD-10-CM

## 2018-08-01 PROCEDURE — 99214 OFFICE O/P EST MOD 30 MIN: CPT | Performed by: INTERNAL MEDICINE

## 2018-08-01 NOTE — PROGRESS NOTES
Juan Manuel oJshua is a 77 y.o. female. Patient is here today for   Chief Complaint   Patient presents with   • Hyperlipidemia          Vitals:    08/01/18 0847   BP: 124/68   Pulse: 53   Resp: 16       The following portions of the patient's history were reviewed and updated as appropriate: allergies, current medications, past family history, past medical history, past social history, past surgical history and problem list.    Past Medical History:   Diagnosis Date   • COPD (chronic obstructive pulmonary disease) (CMS/[facility])    • Depression    • GERD (gastroesophageal reflux disease)    • Hyperglycemia    • Hyperlipidemia    • Hypertension    • Macular degeneration    • Osteoarthritis       No Known Allergies   Social History     Social History   • Marital status:      Spouse name: N/A   • Number of children: N/A   • Years of education: N/A     Occupational History   • Not on file.     Social History Main Topics   • Smoking status: Former Smoker     Packs/day: 1.00     Years: 20.00     Types: Cigarettes     Quit date: 1987   • Smokeless tobacco: Never Used   • Alcohol use 1.2 oz/week     1 Glasses of wine, 1 Cans of beer per week   • Drug use: No   • Sexual activity: Defer     Other Topics Concern   • Not on file     Social History Narrative   • No narrative on file        Current Outpatient Prescriptions:   •  amLODIPine (NORVASC) 5 MG tablet, TAKE ONE TABLET BY MOUTH TWICE DAILY, Disp: 180 tablet, Rfl: 1  •  aspirin 81 MG EC tablet, Take 81 mg by mouth Daily., Disp: , Rfl:   •  atorvastatin (LIPITOR) 40 MG tablet, TAKE ONE TABLET BY MOUTH EVERY NIGHT., Disp: 90 tablet, Rfl: 1  •  BESIVANCE 0.6 % suspension ophthalmic suspension, SHAKE LQ AND INT 1 GTT IN OD BID, Disp: , Rfl: 1  •  budesonide-formoterol (SYMBICORT) 80-4.5 MCG/ACT inhaler, Inhale 2 puffs 2 (Two) Times a Day., Disp: 1 inhaler, Rfl: 5  •  diclofenac (VOLTAREN) 75 MG EC tablet, Take 1 tablet by mouth 2 (Two) Times a Day With Meals., Disp:  180 tablet, Rfl: 0  •  fluticasone (FLONASE) 50 MCG/ACT nasal spray, 2 sprays into each nostril., Disp: , Rfl:   •  furosemide (LASIX) 20 MG tablet, TAKE ONE TABLET BY MOUTH ONCE DAILY, Disp: 90 tablet, Rfl: 1  •  Glucosamine HCl (GLUCOSAMINE PO), Take 50 mg by mouth Daily., Disp: , Rfl:   •  IRON PO, Take  by mouth., Disp: , Rfl:   •  Multiple Vitamins-Minerals (EYE VITAMINS PO), Take  by mouth., Disp: , Rfl:   •  pantoprazole (PROTONIX) 40 MG EC tablet, Take 1 tablet by mouth Daily., Disp: 90 tablet, Rfl: 3  •  sertraline (ZOLOFT) 100 MG tablet, Take 1 tablet by mouth Daily., Disp: 90 tablet, Rfl: 3  •  vitamin D (ERGOCALCIFEROL) 12046 units capsule capsule, Take 1 capsule by mouth Every 7 (Seven) Days., Disp: 12 capsule, Rfl: 1     Objective   History of Present Illness Estela is here for a blood pressure and lab follow-up.  She has hypertension, vitamin D deficiency, hyper lipidemia, depression, COPD, and arthritis.  She feels well.  She eats healthy but does not exercise.  She complains of bilateral knee pain that is limiting her ability to exercise.  She takes diclofenac 75 mg twice a day.  She does monitor her blood pressure and reports normal readings.    Review of Systems   Constitutional: Positive for fatigue.   Cardiovascular:        BP <120/80   Gastrointestinal: Negative.    Musculoskeletal:        Knee pains   Neurological: Negative.    Psychiatric/Behavioral: Negative for dysphoric mood.       Physical Exam   Constitutional: She appears well-developed and well-nourished.   Cardiovascular: Normal rate, regular rhythm and normal heart sounds.    130/70   Pulmonary/Chest: Effort normal and breath sounds normal.   Musculoskeletal: She exhibits no edema.   There is mild deformity of both knees.  There is no effusion.  There is normal range of motion.   Neurological: She is alert.   Psychiatric: She has a normal mood and affect. Her behavior is normal. Judgment and thought content normal.   Vitals  reviewed.      ASSESSMENT     Problem List Items Addressed This Visit        Cardiovascular and Mediastinum    Hyperlipidemia - Primary    Hypertension       Respiratory    Restrictive lung disease       Digestive    Vitamin D deficiency    GERD (gastroesophageal reflux disease)       Musculoskeletal and Integument    Arthritis of left knee    Relevant Orders    Ambulatory Referral to Orthopedic Surgery    Arthritis of knee, right    Relevant Orders    Ambulatory Referral to Orthopedic Surgery       Other    Depression          PLAN  Patient Instructions   Blood pressure is well controlled.  Lipids are normal.  Hemoglobin A1c is normal.  Vitamin D is improved but still low.  Current medicines.  Will refer to orthopedic surgery for evaluation of knee pain.  Discussed appropriate immunizations.      Return in about 6 months (around 2/1/2019) for labs CMP, vitamin D, TSH, urinalysis.

## 2018-08-01 NOTE — PATIENT INSTRUCTIONS
Blood pressure is well controlled.  Lipids are normal.  Hemoglobin A1c is normal.  Vitamin D is improved but still low.  Current medicines.  Will refer to orthopedic surgery for evaluation of knee pain.  Discussed appropriate immunizations.

## 2018-08-20 ENCOUNTER — OFFICE VISIT (OUTPATIENT)
Dept: ORTHOPEDIC SURGERY | Facility: CLINIC | Age: 78
End: 2018-08-20

## 2018-08-20 VITALS — WEIGHT: 175 LBS | HEIGHT: 64 IN | TEMPERATURE: 98.7 F | BODY MASS INDEX: 29.88 KG/M2

## 2018-08-20 DIAGNOSIS — M54.50 CHRONIC BILATERAL LOW BACK PAIN WITHOUT SCIATICA: ICD-10-CM

## 2018-08-20 DIAGNOSIS — M17.11 ARTHRITIS OF KNEE, RIGHT: ICD-10-CM

## 2018-08-20 DIAGNOSIS — M17.12 ARTHRITIS OF LEFT KNEE: Primary | ICD-10-CM

## 2018-08-20 DIAGNOSIS — G89.29 CHRONIC BILATERAL LOW BACK PAIN WITHOUT SCIATICA: ICD-10-CM

## 2018-08-20 PROCEDURE — 73562 X-RAY EXAM OF KNEE 3: CPT | Performed by: ORTHOPAEDIC SURGERY

## 2018-08-20 PROCEDURE — 99204 OFFICE O/P NEW MOD 45 MIN: CPT | Performed by: ORTHOPAEDIC SURGERY

## 2018-08-20 NOTE — PROGRESS NOTES
Patient Name: Estela Joshua   YOB: 1940  Referring Primary Care Physician: Robert Mnier MD  BMI: Body mass index is 30.04 kg/m².    Chief Complaint:    Chief Complaint   Patient presents with   • Right Knee - Pain   • Left Knee - Pain   • Hip Pain     pain        Subjective:    HPI:   Estela Joshua is a pleasant 77 y.o. year old who presents today for evaluation of   Chief Complaint   Patient presents with   • Right Knee - Pain   • Left Knee - Pain   • Hip Pain     pain    (Location). Duration: This has been going on for years. Timing: The pain is persistent. Context or causative factors: unknown.  Relieving Factors:  In the past has tried activtiy modification. Also has some chronic low back and buttock larry nthat radiates into the thighs.  Hard for her to separate them .  Some knee swelling and stiffness.  voltaren helps and Dr Miner monitors labs.  Notices that when she stopped the meds it got worse and better when taken.    This problem is new to this examiner.     Medications:   Home Medications:  Current Outpatient Prescriptions on File Prior to Visit   Medication Sig   • amLODIPine (NORVASC) 5 MG tablet TAKE ONE TABLET BY MOUTH TWICE DAILY   • aspirin 81 MG EC tablet Take 81 mg by mouth Daily.   • atorvastatin (LIPITOR) 40 MG tablet TAKE ONE TABLET BY MOUTH EVERY NIGHT.   • BESIVANCE 0.6 % suspension ophthalmic suspension SHAKE LQ AND INT 1 GTT IN OD BID   • budesonide-formoterol (SYMBICORT) 80-4.5 MCG/ACT inhaler Inhale 2 puffs 2 (Two) Times a Day.   • diclofenac (VOLTAREN) 75 MG EC tablet Take 1 tablet by mouth 2 (Two) Times a Day With Meals.   • fluticasone (FLONASE) 50 MCG/ACT nasal spray 2 sprays into each nostril.   • furosemide (LASIX) 20 MG tablet TAKE ONE TABLET BY MOUTH ONCE DAILY   • Glucosamine HCl (GLUCOSAMINE PO) Take 50 mg by mouth Daily.   • IRON PO Take  by mouth.   • Multiple Vitamins-Minerals (EYE VITAMINS PO) Take  by mouth.   • pantoprazole (PROTONIX) 40 MG EC tablet  Take 1 tablet by mouth Daily.   • sertraline (ZOLOFT) 100 MG tablet Take 1 tablet by mouth Daily.   • vitamin D (ERGOCALCIFEROL) 94946 units capsule capsule Take 1 capsule by mouth Every 7 (Seven) Days.     No current facility-administered medications on file prior to visit.      Current Medications:  Scheduled Meds:  Continuous Infusions:  No current facility-administered medications for this visit.   PRN Meds:.    I have reviewed the patient's medical history in detail and updated the computerized patient record.  Review and summarization of old records includes:    Past Medical History:   Diagnosis Date   • COPD (chronic obstructive pulmonary disease) (CMS/[facility])    • Depression    • GERD (gastroesophageal reflux disease)    • Hyperglycemia    • Hyperlipidemia    • Hypertension    • Macular degeneration    • Osteoarthritis         Past Surgical History:   Procedure Laterality Date   • APPENDECTOMY     • CARDIAC CATHETERIZATION     • CARDIAC CATHETERIZATION N/A 1/31/2017    Procedure: Right and Left Heart Cath;  Surgeon: Pramod Salvador MD;  Location: [facility] CATH INVASIVE LOCATION;  Service:    • CARDIAC CATHETERIZATION N/A 1/31/2017    Procedure: Coronary angiography;  Surgeon: Pramod Salvador MD;  Location: [facility] CATH INVASIVE LOCATION;  Service:    • CARDIAC CATHETERIZATION N/A 1/31/2017    Procedure: Left ventriculography;  Surgeon: Pramod Salvador MD;  Location: [facility] CATH INVASIVE LOCATION;  Service:    • CATARACT EXTRACTION     • COLONOSCOPY N/A 9/1/2017    Procedure: COLONOSCOPY TO CECUM;  Surgeon: Siddhartha Mujica MD;  Location: [facility] ENDOSCOPY;  Service:    • ENDOSCOPY N/A 9/1/2017    Procedure: ESOPHAGOGASTRODUODENOSCOPY WITH COLD BIOPSIES;  Surgeon: Siddhartha Mujica MD;  Location: [facility] ENDOSCOPY;  Service:    • LAPAROSCOPIC TUBAL LIGATION          Social History     Occupational History   • Not on file.     Social History Main Topics   • Smoking status: Former Smoker      "Packs/day: 1.00     Years: 20.00     Types: Cigarettes     Quit date: 1987   • Smokeless tobacco: Never Used   • Alcohol use 1.2 oz/week     1 Glasses of wine, 1 Cans of beer per week   • Drug use: No   • Sexual activity: Defer      Social History     Social History Narrative   • No narrative on file        Family History   Problem Relation Age of Onset   • Alzheimer's disease Mother    • Stroke Mother    • Heart failure Father        ROS: 14 point review of systems was performed and all other systems were reviewed and are negative except for documented findings in HPI and today's encounter.     Allergies: No Known Allergies  Constitutional:  Denies fever, shaking or chills   Eyes:  Denies change in visual acuity   HENT:  Denies nasal congestion or sore throat   Respiratory:  Denies cough or shortness of breath   Cardiovascular:  Denies chest pain or severe LE edema   GI:  Denies abdominal pain, nausea, vomiting, bloody stools or diarrhea   Musculoskeletal:  Numbness, tingling, pain, or loss of motor function only as noted above in history of present illness.  : Denies painful urination or hematuria  Integument:  Denies rash, lesion or ulceration   Neurologic:  Denies headache or focal weakness  Endocrine:  Denies lymphadenopathy  Psych:  Denies confusion or change in mental status   Hem:  Denies active bleeding    Subjective     Objective:    Physical Exam: 77 y.o. female  Wt Readings from Last 3 Encounters:   08/20/18 79.4 kg (175 lb)   08/01/18 78.5 kg (173 lb)   04/04/18 80.6 kg (177 lb 12.8 oz)     Ht Readings from Last 3 Encounters:   08/20/18 162.6 cm (64\")   08/01/18 163.8 cm (64.49\")   04/04/18 163.8 cm (64.49\")     Body mass index is 30.04 kg/m².    Vitals:    08/20/18 1050   Temp: 98.7 °F (37.1 °C)       Vital signs reviewed.   General Appearance:    Alert, cooperative, in no acute distress                  Eyes: conjunctiva clear  ENT: external ears and nose atraumatic  CV: no peripheral edema  Resp: " normal respiratory effort  Skin: no rashes or wounds; normal turgor  Psych: mood and affect appropriate  Lymph: no nodes appreciated  Neuro: gross sensation intact  Vascular:  Palpable peripheral pulse in noted extremity  Musculoskeletal Extremities: KNEE Exam: medial joint line tenderness with crepitation, synovitis, swelling, and joint effusion bilateral knee.      Radiology:   Imaging done today and discussed at length with the patient:    Indication: pain related symptoms,  Views: 3V AP, LAT & 40 degree PA bilateral knee(s)   Findings: severe end-stage arthritis (bone on bone, subchondral sclerosis/cysts, osteophytes)  Comparison views: not available      Assessment:     ICD-10-CM ICD-9-CM   1. Arthritis of left knee M17.12 716.96   2. Arthritis of knee, right M17.11 716.96   3. Chronic bilateral low back pain without sciatica M54.5 724.2    G89.29 338.29        Procedures       Plan: Biomechanics of pertinent body area discussed.  Risks, benefits, alternatives, comparisons, and complications of accepted medicines, injections, recommendations, surgical procedures, and therapies explained and education provided in laymen's terms. The patient was given the opportunity to ask questions and they were answerved to their satisfaction.   Natural history and expected course of this patient's diagnosis discussed along with evaluation of therapies. Questions answered.  PT referral.  Advised on strengthening exercises, stressed importance of these with progression of arthritis, demonstrated exercises to patient with repeat demonstration and verb of understanding.   Compression/elastic brace/support sleeve.   Rest, ice, compression, and elevation (RICE) therapy.  Biomechanics and proper use of ambulatory aids:  crutches, walker, cane, &/or trekking poles.   Could inject if she wants.  Suspect lumbar issues possibly stenosis and explained and could work up .  If knees warrant, call for cortisone injections  there.      8/20/2018

## 2018-09-17 ENCOUNTER — TREATMENT (OUTPATIENT)
Dept: PHYSICAL THERAPY | Facility: CLINIC | Age: 78
End: 2018-09-17

## 2018-09-17 DIAGNOSIS — G89.29 CHRONIC BILATERAL LOW BACK PAIN WITHOUT SCIATICA: Primary | ICD-10-CM

## 2018-09-17 DIAGNOSIS — M25.562 CHRONIC PAIN OF BOTH KNEES: ICD-10-CM

## 2018-09-17 DIAGNOSIS — G89.29 CHRONIC PAIN OF BOTH KNEES: ICD-10-CM

## 2018-09-17 DIAGNOSIS — M25.561 CHRONIC PAIN OF BOTH KNEES: ICD-10-CM

## 2018-09-17 DIAGNOSIS — M54.50 CHRONIC BILATERAL LOW BACK PAIN WITHOUT SCIATICA: Primary | ICD-10-CM

## 2018-09-17 PROCEDURE — 97162 PT EVAL MOD COMPLEX 30 MIN: CPT | Performed by: PHYSICAL THERAPIST

## 2018-09-17 PROCEDURE — 97110 THERAPEUTIC EXERCISES: CPT | Performed by: PHYSICAL THERAPIST

## 2018-09-17 PROCEDURE — 97140 MANUAL THERAPY 1/> REGIONS: CPT | Performed by: PHYSICAL THERAPIST

## 2018-09-17 PROCEDURE — G8979 MOBILITY GOAL STATUS: HCPCS | Performed by: PHYSICAL THERAPIST

## 2018-09-17 PROCEDURE — G8978 MOBILITY CURRENT STATUS: HCPCS | Performed by: PHYSICAL THERAPIST

## 2018-09-17 NOTE — PATIENT INSTRUCTIONS
Pt was educated on the importance of their HEP and their current need for skilled physical therapy. Patients goals and potential limitations were discussed and pt is in agreement with current plan of care and treatment emphasis.

## 2018-09-17 NOTE — PROGRESS NOTES
Physical Therapy Initial Evaluation and Plan of Care      Patient: Estela Joshua   : 1940  Diagnosis/ICD-10 Code:  Chronic bilateral low back pain without sciatica [M54.5, G89.29]  Referring practitioner: Mike Abarca MD    Subjective Evaluation    History of Present Illness  Mechanism of injury: Chronic L > R knee pain nad back pain  Burning and achy  Saw Makk. No cartilage  PAin with getting up from sitting.  PAin with going up  HAd good results with DC but no treatment in last 6 months  I was practicing SLS but getting harder      Patient Occupation: partime at [email or web address] 3 days/week Pain  Location: suprapatellar, infrapatellar  Quality: burning and dull ache  Relieving factors: medications  Aggravating factors: stairs and movement    Social Support  Lives in: multiple-level home  Lives with: spouse    Diagnostic Tests  X-ray: abnormal             Objective       Active Range of Motion     Lumbar   Normal active range of motion  Left Knee   Flexion: 134 degrees   Extension: 0 degrees     Right Knee   Flexion: 136 degrees   Extension: 0 degrees     Additional Active Range of Motion Details  Pressup had crepitus and only slight twinge in back    Passive Range of Motion     Additional Passive Range of Motion Details  Decreased thoracic seg mobility at T6-T10    Strength/Myotome Testing     Left Hip   Planes of Motion   Flexion: 4+  Extension: 3  Abduction: 4-  External rotation: 4+  Internal rotation: 4    Right Hip   Planes of Motion   Flexion: 4+  Extension: 3  Abduction: 4+  External rotation: 5  Internal rotation: 5    Left Knee   Extension: 5    Right Knee   Extension: 5    Left Ankle/Foot   Dorsiflexion: 5    Right Ankle/Foot   Dorsiflexion: 5    Tests     Lumbar     Left   Positive femoral stretch.   Negative passive SLR and quadrant.     Right   Negative femoral stretch, passive SLR and quadrant.     Additional Tests Details  Crepitus R with PA's but not left lumbar         Assessment & Plan      Assessment  Impairments: activity intolerance, impaired balance, impaired physical strength, lacks appropriate home exercise program and pain with function  Assessment details: Pt is a good candidate for skilled PT intervention to restore functional AROM and strength to return to previous level of ADL's.  Prognosis: good  Prognosis details: Short Term Goals (4 weeks)  1. Pt to exhibit L glut med strength to 4/5 to allow for improved gait and stairclimbing  2. Pt to report less pain with ascending stairs  3. Pt to exhibit lower ab  strength to 4-/5 allow for prolonged standing and walking    Long Term Goals: (8 weeks)  1. Pt to exhibit >4/5 hip abd strength to allow for traversing curbs and ascending./ descending stairs  2. Pt to report min to no pain with stairs  3. Pt to score >50/80 on LEFS  4. Pt to exhibit >4/5 lower abdominal  strength to allow for more strenuous ADL's and recreational activities  Functional Limitations: lifting, walking, uncomfortable because of pain, sitting, standing and stooping      Manual Therapy:    8     mins  38721;  Therapeutic Exercise:    15     mins  63551;     Neuromuscular Travis:        mins  13619;    Therapeutic Activity:          mins  26595;     Gait Training:           mins  17843;     Ultrasound:          mins  54169;    Electrical Stimulation:         mins  78991 ( );  Dry Needling          mins self-pay    Timed Treatment:   23   mins   Total Treatment:     55   mins    PT SIGNATURE: Alethea Reyes, PT   [place] License # [id number]  DATE TREATMENT INITIATED: 9/17/2018    Medicare Initial Certification  Certification Period: 12/16/2018  I certify that the therapy services are furnished while this patient is under my care.  The services outlined above are required by this patient, and will be reviewed every 90 days.     PHYSICIAN: Mike Abarca MD      DATE:     Please sign and return via fax to [phone].. Thank you, [facility] Physical Therapy.

## 2019-01-25 DIAGNOSIS — E55.9 VITAMIN D DEFICIENCY: ICD-10-CM

## 2019-01-25 DIAGNOSIS — I10 ESSENTIAL HYPERTENSION: ICD-10-CM

## 2019-01-29 RX ORDER — ERGOCALCIFEROL 1.25 MG/1
CAPSULE ORAL
Qty: 12 CAPSULE | Refills: 0 | Status: SHIPPED | OUTPATIENT
Start: 2019-01-29 | End: 2019-05-20 | Stop reason: SDUPTHER

## 2019-01-29 RX ORDER — PANTOPRAZOLE SODIUM 40 MG/1
TABLET, DELAYED RELEASE ORAL
Qty: 90 TABLET | Refills: 0 | Status: SHIPPED | OUTPATIENT
Start: 2019-01-29 | End: 2019-05-20 | Stop reason: SDUPTHER

## 2019-02-05 LAB
25(OH)D3+25(OH)D2 SERPL-MCNC: 27.7 NG/ML (ref 30–100)
ALBUMIN SERPL-MCNC: 4.3 G/DL (ref 3.5–5.2)
ALBUMIN/GLOB SERPL: 2.2 G/DL
ALP SERPL-CCNC: 50 U/L (ref 39–117)
ALT SERPL-CCNC: 18 U/L (ref 1–33)
APPEARANCE UR: CLEAR
AST SERPL-CCNC: 15 U/L (ref 1–32)
BACTERIA #/AREA URNS HPF: ABNORMAL /HPF
BILIRUB SERPL-MCNC: 0.3 MG/DL (ref 0.1–1.2)
BILIRUB UR QL STRIP: NEGATIVE
BUN SERPL-MCNC: 21 MG/DL (ref 8–23)
BUN/CREAT SERPL: 28.4 (ref 7–25)
CALCIUM SERPL-MCNC: 10 MG/DL (ref 8.6–10.5)
CASTS URNS MICRO: ABNORMAL
CHLORIDE SERPL-SCNC: 107 MMOL/L (ref 98–107)
CO2 SERPL-SCNC: 28 MMOL/L (ref 22–29)
COLOR UR: YELLOW
CREAT SERPL-MCNC: 0.74 MG/DL (ref 0.57–1)
CRYSTALS URNS MICRO: ABNORMAL
EPI CELLS #/AREA URNS HPF: ABNORMAL /HPF
GLOBULIN SER CALC-MCNC: 2 GM/DL
GLUCOSE SERPL-MCNC: 95 MG/DL (ref 65–99)
GLUCOSE UR QL: NEGATIVE
HGB UR QL STRIP: NEGATIVE
KETONES UR QL STRIP: NEGATIVE
LEUKOCYTE ESTERASE UR QL STRIP: (no result)
NITRITE UR QL STRIP: NEGATIVE
PH UR STRIP: 5.5 [PH] (ref 5–8)
POTASSIUM SERPL-SCNC: 4.9 MMOL/L (ref 3.5–5.2)
PROT SERPL-MCNC: 6.3 G/DL (ref 6–8.5)
PROT UR QL STRIP: NEGATIVE
RBC #/AREA URNS HPF: ABNORMAL /HPF
SODIUM SERPL-SCNC: 145 MMOL/L (ref 136–145)
SP GR UR: 1.02 (ref 1–1.03)
TSH SERPL DL<=0.005 MIU/L-ACNC: 2.03 MIU/ML (ref 0.27–4.2)
UROBILINOGEN UR STRIP-MCNC: (no result) MG/DL
WBC #/AREA URNS HPF: ABNORMAL /HPF

## 2019-02-08 ENCOUNTER — OFFICE VISIT (OUTPATIENT)
Dept: FAMILY MEDICINE CLINIC | Facility: CLINIC | Age: 79
End: 2019-02-08

## 2019-02-08 VITALS
HEIGHT: 64 IN | BODY MASS INDEX: 29.71 KG/M2 | RESPIRATION RATE: 16 BRPM | HEART RATE: 60 BPM | SYSTOLIC BLOOD PRESSURE: 124 MMHG | WEIGHT: 174 LBS | DIASTOLIC BLOOD PRESSURE: 64 MMHG

## 2019-02-08 DIAGNOSIS — D50.0 IRON DEFICIENCY ANEMIA DUE TO CHRONIC BLOOD LOSS: ICD-10-CM

## 2019-02-08 DIAGNOSIS — I10 ESSENTIAL HYPERTENSION: Primary | ICD-10-CM

## 2019-02-08 DIAGNOSIS — Z12.39 SCREENING FOR BREAST CANCER: ICD-10-CM

## 2019-02-08 DIAGNOSIS — E78.5 HYPERLIPIDEMIA, UNSPECIFIED HYPERLIPIDEMIA TYPE: ICD-10-CM

## 2019-02-08 PROCEDURE — 99214 OFFICE O/P EST MOD 30 MIN: CPT | Performed by: INTERNAL MEDICINE

## 2019-02-08 NOTE — PATIENT INSTRUCTIONS
Blood pressure is normal.  Vitamin D is low.  Suggest taking a daily vitamin D supplement and continuing vitamin D 50,000 units weekly.  Other labs are normal.  We will check a CBC and iron studies and stop iron if normal.

## 2019-02-08 NOTE — PROGRESS NOTES
Juan Manuel Joshua is a 78 y.o. female. Patient is here today for   Chief Complaint   Patient presents with   • Hypertension   • Vitamin D Deficiency          Vitals:    19 0850   BP: 124/64   Pulse: 60   Resp: 16       The following portions of the patient's history were reviewed and updated as appropriate: allergies, current medications, past family history, past medical history, past social history, past surgical history and problem list.    Past Medical History:   Diagnosis Date   • COPD (chronic obstructive pulmonary disease) (CMS/[facility])    • Depression    • GERD (gastroesophageal reflux disease)    • Hyperglycemia    • Hyperlipidemia    • Hypertension    • Macular degeneration    • Osteoarthritis       No Known Allergies   Social History     Socioeconomic History   • Marital status:      Spouse name: Not on file   • Number of children: Not on file   • Years of education: Not on file   • Highest education level: Not on file   Social Needs   • Financial resource strain: Not on file   • Food insecurity - worry: Not on file   • Food insecurity - inability: Not on file   • Transportation needs - medical: Not on file   • Transportation needs - non-medical: Not on file   Occupational History   • Not on file   Tobacco Use   • Smoking status: Former Smoker     Packs/day: 1.00     Years: 20.00     Pack years: 20.00     Types: Cigarettes     Last attempt to quit:      Years since quittin.1   • Smokeless tobacco: Never Used   Substance and Sexual Activity   • Alcohol use: Yes     Alcohol/week: 1.2 oz     Types: 1 Glasses of wine, 1 Cans of beer per week   • Drug use: No   • Sexual activity: Defer   Other Topics Concern   • Not on file   Social History Narrative   • Not on file        Current Outpatient Medications:   •  amLODIPine (NORVASC) 5 MG tablet, TAKE ONE TABLET BY MOUTH TWICE DAILY, Disp: 180 tablet, Rfl: 1  •  aspirin 81 MG EC tablet, Take 81 mg by mouth Daily., Disp: , Rfl:   •   atorvastatin (LIPITOR) 40 MG tablet, TAKE ONE TABLET BY MOUTH EVERY NIGHT., Disp: 90 tablet, Rfl: 1  •  BESIVANCE 0.6 % suspension ophthalmic suspension, SHAKE LQ AND INT 1 GTT IN OD BID, Disp: , Rfl: 1  •  budesonide-formoterol (SYMBICORT) 80-4.5 MCG/ACT inhaler, Inhale 2 puffs 2 (Two) Times a Day., Disp: 1 inhaler, Rfl: 5  •  diclofenac (VOLTAREN) 75 MG EC tablet, Take 1 tablet by mouth 2 (Two) Times a Day With Meals., Disp: 180 tablet, Rfl: 0  •  fluticasone (FLONASE) 50 MCG/ACT nasal spray, 2 sprays into each nostril., Disp: , Rfl:   •  furosemide (LASIX) 20 MG tablet, TAKE ONE TABLET BY MOUTH ONCE DAILY, Disp: 90 tablet, Rfl: 1  •  Glucosamine HCl (GLUCOSAMINE PO), Take 50 mg by mouth Daily., Disp: , Rfl:   •  IRON PO, Take  by mouth., Disp: , Rfl:   •  Multiple Vitamins-Minerals (EYE VITAMINS PO), Take  by mouth., Disp: , Rfl:   •  pantoprazole (PROTONIX) 40 MG EC tablet, TAKE ONE TABLET BY MOUTH DAILY, Disp: 90 tablet, Rfl: 0  •  sertraline (ZOLOFT) 100 MG tablet, Take 1 tablet by mouth Daily., Disp: 90 tablet, Rfl: 3  •  vitamin D (ERGOCALCIFEROL) 98598 units capsule capsule, TAKE ONE CAPSULE BY MOUTH ONCE WEEKLY, Disp: 12 capsule, Rfl: 0     Objective   History of Present Illness Estela is here for a blood pressure and lab follow-up.  She has hypertension, hyperlipidemia, COPD, gastroesophageal reflux, depression, vitamin D deficiency, and arthritis.  She feels well.  She does monitor her blood pressure reports stable readings.  She eats healthy and tries to be physically active but does not exercise.  She does have osteoarthritis of the knees and went through some physical therapy which helped.  She is taking diclofenac.  Had iron deficiency anemia 18 months ago and is still taking iron.  She did have an EGD and colonoscopy.  Immunizations are up-to-date except for shingles vaccination.    Review of Systems   Constitutional: Negative for activity change and unexpected weight change.   Respiratory:  Negative.    Cardiovascular:        BP <120/80   Neurological: Negative.    Psychiatric/Behavioral: Negative for dysphoric mood.       Physical Exam   Constitutional: She appears well-developed and well-nourished.   Cardiovascular: Normal rate, regular rhythm and normal heart sounds.   Pulmonary/Chest: Effort normal and breath sounds normal.   Musculoskeletal: She exhibits no edema.   Neurological: She is alert.   Psychiatric: She has a normal mood and affect. Her behavior is normal. Judgment and thought content normal.   Vitals reviewed.      ASSESSMENT     Problem List Items Addressed This Visit        Cardiovascular and Mediastinum    Hyperlipidemia    Hypertension - Primary       Hematopoietic and Hemostatic    Iron deficiency anemia    Relevant Orders    CBC & Differential    Iron Profile    Ferritin      Other Visit Diagnoses     Screening for breast cancer        Relevant Orders    Mammo Screening Bilateral With CAD          PLAN  Patient Instructions   Blood pressure is normal.  Vitamin D is low.  Suggest taking a daily vitamin D supplement and continuing vitamin D 50,000 units weekly.  Other labs are normal.  We will check a CBC and iron studies and stop iron if normal.      Return in about 6 months (around 8/8/2019) for labsCBC,VIT D ,LIPID.

## 2019-02-09 LAB
BASOPHILS # BLD AUTO: 0.03 10*3/MM3 (ref 0–0.2)
BASOPHILS NFR BLD AUTO: 0.3 % (ref 0–1.5)
EOSINOPHIL # BLD AUTO: 0.1 10*3/MM3 (ref 0–0.7)
EOSINOPHIL NFR BLD AUTO: 1.1 % (ref 0.3–6.2)
ERYTHROCYTE [DISTWIDTH] IN BLOOD BY AUTOMATED COUNT: 13.5 % (ref 11.7–13)
FERRITIN SERPL-MCNC: 151 NG/ML (ref 13–150)
HCT VFR BLD AUTO: 43 % (ref 35.6–45.5)
HGB BLD-MCNC: 13.6 G/DL (ref 11.9–15.5)
IMM GRANULOCYTES # BLD AUTO: 0.02 10*3/MM3 (ref 0–0.03)
IMM GRANULOCYTES NFR BLD AUTO: 0.2 % (ref 0–0.5)
IRON SATN MFR SERPL: 23 % (ref 20–50)
IRON SERPL-MCNC: 94 MCG/DL (ref 37–145)
LYMPHOCYTES # BLD AUTO: 1.57 10*3/MM3 (ref 0.9–4.8)
LYMPHOCYTES NFR BLD AUTO: 17.1 % (ref 19.6–45.3)
MCH RBC QN AUTO: 30.8 PG (ref 26.9–32)
MCHC RBC AUTO-ENTMCNC: 31.6 G/DL (ref 32.4–36.3)
MCV RBC AUTO: 97.5 FL (ref 80.5–98.2)
MONOCYTES # BLD AUTO: 0.63 10*3/MM3 (ref 0.2–1.2)
MONOCYTES NFR BLD AUTO: 6.9 % (ref 5–12)
NEUTROPHILS # BLD AUTO: 6.81 10*3/MM3 (ref 1.9–8.1)
NEUTROPHILS NFR BLD AUTO: 74.4 % (ref 42.7–76)
PLATELET # BLD AUTO: 241 10*3/MM3 (ref 140–500)
RBC # BLD AUTO: 4.41 10*6/MM3 (ref 3.9–5.2)
TIBC SERPL-MCNC: 411 MCG/DL
UIBC SERPL-MCNC: 317 MCG/DL
WBC # BLD AUTO: 9.16 10*3/MM3 (ref 4.5–10.7)

## 2019-02-12 ENCOUNTER — TELEPHONE (OUTPATIENT)
Dept: FAMILY MEDICINE CLINIC | Facility: CLINIC | Age: 79
End: 2019-02-12

## 2019-02-12 NOTE — TELEPHONE ENCOUNTER
I called the patient and left detailed voicemail.   Per Dr. Miner, she can stop taking the Iron.   Follow up as scheduled.

## 2019-02-14 ENCOUNTER — TRANSCRIBE ORDERS (OUTPATIENT)
Dept: FAMILY MEDICINE CLINIC | Facility: CLINIC | Age: 79
End: 2019-02-14

## 2019-02-14 ENCOUNTER — TRANSCRIBE ORDERS (OUTPATIENT)
Dept: ADMINISTRATIVE | Facility: HOSPITAL | Age: 79
End: 2019-02-14

## 2019-02-14 DIAGNOSIS — Z12.31 SCREENING MAMMOGRAM, ENCOUNTER FOR: Primary | ICD-10-CM

## 2019-03-18 ENCOUNTER — TELEPHONE (OUTPATIENT)
Dept: FAMILY MEDICINE CLINIC | Facility: CLINIC | Age: 79
End: 2019-03-18

## 2019-03-18 ENCOUNTER — HOSPITAL ENCOUNTER (OUTPATIENT)
Dept: MAMMOGRAPHY | Facility: HOSPITAL | Age: 79
Discharge: HOME | End: 2019-03-18
Admitting: INTERNAL MEDICINE

## 2019-03-18 DIAGNOSIS — Z12.31 SCREENING MAMMOGRAM, ENCOUNTER FOR: ICD-10-CM

## 2019-03-18 PROCEDURE — 77067 SCR MAMMO BI INCL CAD: CPT

## 2019-03-18 PROCEDURE — 77063 BREAST TOMOSYNTHESIS BI: CPT

## 2019-03-18 RX ORDER — ATORVASTATIN CALCIUM 40 MG/1
TABLET, FILM COATED ORAL
Qty: 90 TABLET | Refills: 0 | Status: SHIPPED | OUTPATIENT
Start: 2019-03-18 | End: 2019-05-20 | Stop reason: SDUPTHER

## 2019-03-18 RX ORDER — SERTRALINE HYDROCHLORIDE 100 MG/1
TABLET, FILM COATED ORAL
Qty: 90 TABLET | Refills: 0 | Status: SHIPPED | OUTPATIENT
Start: 2019-03-18 | End: 2019-05-20 | Stop reason: SDUPTHER

## 2019-03-18 RX ORDER — DICLOFENAC SODIUM 75 MG/1
75 TABLET, DELAYED RELEASE ORAL 2 TIMES DAILY WITH MEALS
Qty: 180 TABLET | Refills: 0 | Status: SHIPPED | OUTPATIENT
Start: 2019-03-18 | End: 2019-09-24 | Stop reason: SDUPTHER

## 2019-03-18 NOTE — TELEPHONE ENCOUNTER
Rx sent to pharmacy      ----- Message from Mark Blum Rep sent at 3/18/2019  8:31 AM EDT -----  PT NEEDS SCRIPT REFILL FOR diclofenac (VOLTAREN) 75 MG EC Take 1 tablet by mouth 2 (Two) Times a Day With Meals #180    PLEASE SEND TO [facility] ON [facility] AND [address].  IF YOU HAVE ANY QUESTIONS PLEASE CONTACT PT [phone]

## 2019-04-08 RX ORDER — AMLODIPINE BESYLATE 5 MG/1
TABLET ORAL
Qty: 180 TABLET | Refills: 0 | Status: SHIPPED | OUTPATIENT
Start: 2019-04-08 | End: 2019-05-20 | Stop reason: SDUPTHER

## 2019-05-20 RX ORDER — ATORVASTATIN CALCIUM 40 MG/1
TABLET, FILM COATED ORAL
Qty: 90 TABLET | Refills: 0 | Status: SHIPPED | OUTPATIENT
Start: 2019-05-20 | End: 2019-09-24 | Stop reason: SDUPTHER

## 2019-05-20 RX ORDER — AMLODIPINE BESYLATE 5 MG/1
TABLET ORAL
Qty: 180 TABLET | Refills: 0 | Status: SHIPPED | OUTPATIENT
Start: 2019-05-20 | End: 2019-07-20 | Stop reason: SDUPTHER

## 2019-05-20 RX ORDER — ERGOCALCIFEROL 1.25 MG/1
CAPSULE ORAL
Qty: 12 CAPSULE | Refills: 0 | Status: SHIPPED | OUTPATIENT
Start: 2019-05-20 | End: 2022-08-06 | Stop reason: SDUPTHER

## 2019-05-20 RX ORDER — PANTOPRAZOLE SODIUM 40 MG/1
TABLET, DELAYED RELEASE ORAL
Qty: 90 TABLET | Refills: 0 | Status: SHIPPED | OUTPATIENT
Start: 2019-05-20 | End: 2019-08-28 | Stop reason: SDUPTHER

## 2019-05-20 RX ORDER — SERTRALINE HYDROCHLORIDE 100 MG/1
TABLET, FILM COATED ORAL
Qty: 90 TABLET | Refills: 0 | Status: SHIPPED | OUTPATIENT
Start: 2019-05-20 | End: 2019-09-24 | Stop reason: SDUPTHER

## 2019-06-04 RX ORDER — BUDESONIDE AND FORMOTEROL FUMARATE DIHYDRATE 80; 4.5 UG/1; UG/1
2 AEROSOL RESPIRATORY (INHALATION)
Qty: 1 INHALER | Refills: 5 | Status: SHIPPED | OUTPATIENT
Start: 2019-06-04 | End: 2019-07-20 | Stop reason: SDUPTHER

## 2019-06-17 ENCOUNTER — OFFICE VISIT (OUTPATIENT)
Dept: FAMILY MEDICINE CLINIC | Facility: CLINIC | Age: 79
End: 2019-06-17

## 2019-06-17 VITALS
DIASTOLIC BLOOD PRESSURE: 74 MMHG | HEART RATE: 103 BPM | WEIGHT: 174 LBS | BODY MASS INDEX: 29.71 KG/M2 | OXYGEN SATURATION: 96 % | HEIGHT: 64 IN | SYSTOLIC BLOOD PRESSURE: 138 MMHG

## 2019-06-17 DIAGNOSIS — R42 EPISODIC LIGHTHEADEDNESS: Primary | ICD-10-CM

## 2019-06-17 DIAGNOSIS — R73.9 HYPERGLYCEMIA: ICD-10-CM

## 2019-06-17 DIAGNOSIS — I10 ESSENTIAL HYPERTENSION: ICD-10-CM

## 2019-06-17 PROCEDURE — 93000 ELECTROCARDIOGRAM COMPLETE: CPT | Performed by: INTERNAL MEDICINE

## 2019-06-17 PROCEDURE — 99214 OFFICE O/P EST MOD 30 MIN: CPT | Performed by: INTERNAL MEDICINE

## 2019-06-17 NOTE — PROGRESS NOTES
Juan Manuel Joshua is a 78 y.o. female. Patient is here today for   Chief Complaint   Patient presents with   • elevated blood sugars          Vitals:    19 1354   BP: 138/74   Pulse: 103   SpO2: 96%     The following portions of the patient's history were reviewed and updated as appropriate: allergies, current medications, past family history, past medical history, past social history, past surgical history and problem list.    Past Medical History:   Diagnosis Date   • COPD (chronic obstructive pulmonary disease) (CMS/[facility])    • Depression    • GERD (gastroesophageal reflux disease)    • Hyperglycemia    • Hyperlipidemia    • Hypertension    • Macular degeneration    • Osteoarthritis       No Known Allergies   Social History     Socioeconomic History   • Marital status:      Spouse name: Not on file   • Number of children: Not on file   • Years of education: Not on file   • Highest education level: Not on file   Tobacco Use   • Smoking status: Former Smoker     Packs/day: 1.00     Years: 20.00     Pack years: 20.00     Types: Cigarettes     Last attempt to quit:      Years since quittin.4   • Smokeless tobacco: Never Used   Substance and Sexual Activity   • Alcohol use: Yes     Alcohol/week: 1.2 oz     Types: 1 Glasses of wine, 1 Cans of beer per week   • Drug use: No   • Sexual activity: Defer        Current Outpatient Medications:   •  amLODIPine (NORVASC) 5 MG tablet, TAKE ONE TABLET BY MOUTH TWO TIMES A DAY, Disp: 180 tablet, Rfl: 0  •  aspirin 81 MG EC tablet, Take 81 mg by mouth Daily., Disp: , Rfl:   •  atorvastatin (LIPITOR) 40 MG tablet, TAKE ONE TABLET BY MOUTH ONCE NIGHTLY, Disp: 90 tablet, Rfl: 0  •  BESIVANCE 0.6 % suspension ophthalmic suspension, SHAKE LQ AND INT 1 GTT IN OD BID, Disp: , Rfl: 1  •  budesonide-formoterol (SYMBICORT) 80-4.5 MCG/ACT inhaler, Inhale 2 puffs 2 (Two) Times a Day., Disp: 1 inhaler, Rfl: 5  •  diclofenac (VOLTAREN) 75 MG EC tablet, Take 1 tablet  by mouth 2 (Two) Times a Day With Meals., Disp: 180 tablet, Rfl: 0  •  fluticasone (FLONASE) 50 MCG/ACT nasal spray, 2 sprays into each nostril., Disp: , Rfl:   •  furosemide (LASIX) 20 MG tablet, TAKE ONE TABLET BY MOUTH ONCE DAILY, Disp: 90 tablet, Rfl: 1  •  Glucosamine HCl (GLUCOSAMINE PO), Take 50 mg by mouth Daily., Disp: , Rfl:   •  IRON PO, Take  by mouth., Disp: , Rfl:   •  Multiple Vitamins-Minerals (EYE VITAMINS PO), Take  by mouth., Disp: , Rfl:   •  pantoprazole (PROTONIX) 40 MG EC tablet, TAKE ONE TABLET BY MOUTH DAILY, Disp: 90 tablet, Rfl: 0  •  sertraline (ZOLOFT) 100 MG tablet, TAKE ONE TABLET BY MOUTH DAILY, Disp: 90 tablet, Rfl: 0  •  vitamin D (ERGOCALCIFEROL) 41960 units capsule capsule, TAKE ONE CAPSULE BY MOUTH ONCE WEEKLY, Disp: 12 capsule, Rfl: 0     Objective     History of Present Illness Estela has been having sudden episodes of lightheadedness and weakness that are becoming more frequent.  She checks her blood pressure during the episodes and does not report any low readings.  She does not check her pulse during the episodes.  She also has been doing Accu-Chek readings as she states that she had hypoglycemia when she was younger.  Her Accu-Chek readings are variable but not low.  One was 270.  She does have hypertension, hyperlipidemia, and COPD.  Her daughter is present today.  She denies chest pain.  Her daughter states that she has more dyspnea on exertion over the last few months.  She is on Symbicort.  She does have a history of iron deficiency anemia.  A complete blood count and iron studies and ferritin were normal in February.    Review of Systems   Constitutional: Negative.    Respiratory: Positive for shortness of breath.    Cardiovascular: Negative for chest pain.   Gastrointestinal: Negative.    Genitourinary: Negative.    Neurological: Positive for dizziness and light-headedness.   Psychiatric/Behavioral: Negative.        Physical Exam   Constitutional: She appears  well-developed and well-nourished.   Neck: No JVD present.   Cardiovascular: Normal rate, regular rhythm and normal heart sounds.   Pulmonary/Chest: Effort normal. She has no wheezes. She has no rales.   Musculoskeletal: She exhibits no edema.   Neurological: She is alert.   Psychiatric: She has a normal mood and affect. Her behavior is normal. Judgment and thought content normal.   Vitals reviewed.      ASSESSMENT     Problem List Items Addressed This Visit        Cardiovascular and Mediastinum    Hypertension       Other    Hyperglycemia    Relevant Orders    Comprehensive Metabolic Panel    Hemoglobin A1c    CBC & Differential    Holter monitor - 24 hour    Episodic lightheadedness - Primary    Relevant Orders    ECG 12 Lead    Comprehensive Metabolic Panel    Hemoglobin A1c    CBC & Differential    Holter monitor - 24 hour          PLAN  Patient Instructions   We will check some lab work and obtain a 24-hour Holter monitor.    Return for Next scheduled follow up.

## 2019-06-18 LAB
ALBUMIN SERPL-MCNC: 4.2 G/DL (ref 3.5–5.2)
ALBUMIN/GLOB SERPL: 1.7 G/DL
ALP SERPL-CCNC: 59 U/L (ref 39–117)
ALT SERPL-CCNC: 18 U/L (ref 1–33)
AST SERPL-CCNC: 13 U/L (ref 1–32)
BASOPHILS # BLD AUTO: 0.04 10*3/MM3 (ref 0–0.2)
BASOPHILS NFR BLD AUTO: 0.6 % (ref 0–1.5)
BILIRUB SERPL-MCNC: 0.3 MG/DL (ref 0.2–1.2)
BUN SERPL-MCNC: 20 MG/DL (ref 8–23)
BUN/CREAT SERPL: 26.7 (ref 7–25)
CALCIUM SERPL-MCNC: 9.3 MG/DL (ref 8.6–10.5)
CHLORIDE SERPL-SCNC: 109 MMOL/L (ref 98–107)
CO2 SERPL-SCNC: 26.4 MMOL/L (ref 22–29)
CREAT SERPL-MCNC: 0.75 MG/DL (ref 0.57–1)
EOSINOPHIL # BLD AUTO: 0.09 10*3/MM3 (ref 0–0.4)
EOSINOPHIL NFR BLD AUTO: 1.5 % (ref 0.3–6.2)
ERYTHROCYTE [DISTWIDTH] IN BLOOD BY AUTOMATED COUNT: 13.3 % (ref 12.3–15.4)
GLOBULIN SER CALC-MCNC: 2.5 GM/DL
GLUCOSE SERPL-MCNC: 114 MG/DL (ref 65–99)
HBA1C MFR BLD: 5.2 % (ref 4.8–5.6)
HCT VFR BLD AUTO: 41.5 % (ref 34–46.6)
HGB BLD-MCNC: 12.8 G/DL (ref 12–15.9)
IMM GRANULOCYTES # BLD AUTO: 0.02 10*3/MM3 (ref 0–0.05)
IMM GRANULOCYTES NFR BLD AUTO: 0.3 % (ref 0–0.5)
LYMPHOCYTES # BLD AUTO: 1.38 10*3/MM3 (ref 0.7–3.1)
LYMPHOCYTES NFR BLD AUTO: 22.3 % (ref 19.6–45.3)
MCH RBC QN AUTO: 30.2 PG (ref 26.6–33)
MCHC RBC AUTO-ENTMCNC: 30.8 G/DL (ref 31.5–35.7)
MCV RBC AUTO: 97.9 FL (ref 79–97)
MONOCYTES # BLD AUTO: 0.46 10*3/MM3 (ref 0.1–0.9)
MONOCYTES NFR BLD AUTO: 7.4 % (ref 5–12)
NEUTROPHILS # BLD AUTO: 4.21 10*3/MM3 (ref 1.7–7)
NEUTROPHILS NFR BLD AUTO: 67.9 % (ref 42.7–76)
NRBC BLD AUTO-RTO: 0 /100 WBC (ref 0–0.2)
PLATELET # BLD AUTO: 238 10*3/MM3 (ref 140–450)
POTASSIUM SERPL-SCNC: 4 MMOL/L (ref 3.5–5.2)
PROT SERPL-MCNC: 6.7 G/DL (ref 6–8.5)
RBC # BLD AUTO: 4.24 10*6/MM3 (ref 3.77–5.28)
SODIUM SERPL-SCNC: 146 MMOL/L (ref 136–145)
WBC # BLD AUTO: 6.2 10*3/MM3 (ref 3.4–10.8)

## 2019-06-24 ENCOUNTER — HOSPITAL ENCOUNTER (OUTPATIENT)
Dept: CARDIOLOGY | Facility: HOSPITAL | Age: 79
Discharge: HOME | End: 2019-06-24
Admitting: INTERNAL MEDICINE

## 2019-06-24 PROCEDURE — 93225 XTRNL ECG REC<48 HRS REC: CPT

## 2019-06-24 PROCEDURE — 93226 XTRNL ECG REC<48 HR SCAN A/R: CPT

## 2019-06-27 PROCEDURE — 93227 XTRNL ECG REC<48 HR R&I: CPT | Performed by: INTERNAL MEDICINE

## 2019-07-22 RX ORDER — BUDESONIDE AND FORMOTEROL FUMARATE DIHYDRATE 80; 4.5 UG/1; UG/1
2 AEROSOL RESPIRATORY (INHALATION)
Qty: 1 INHALER | Refills: 5 | Status: SHIPPED | OUTPATIENT
Start: 2019-07-22 | End: 2020-03-16 | Stop reason: SDUPTHER

## 2019-07-22 RX ORDER — AMLODIPINE BESYLATE 5 MG/1
5 TABLET ORAL 2 TIMES DAILY
Qty: 180 TABLET | Refills: 0 | Status: SHIPPED | OUTPATIENT
Start: 2019-07-22 | End: 2020-02-17

## 2019-07-31 DIAGNOSIS — I10 ESSENTIAL HYPERTENSION: ICD-10-CM

## 2019-07-31 DIAGNOSIS — E55.9 VITAMIN D DEFICIENCY: Primary | ICD-10-CM

## 2019-07-31 DIAGNOSIS — E78.5 HYPERLIPIDEMIA, UNSPECIFIED HYPERLIPIDEMIA TYPE: ICD-10-CM

## 2019-08-09 LAB
25(OH)D3+25(OH)D2 SERPL-MCNC: 38.9 NG/ML (ref 30–100)
BASOPHILS # BLD AUTO: 0.03 10*3/MM3 (ref 0–0.2)
BASOPHILS NFR BLD AUTO: 0.4 % (ref 0–1.5)
CHOLEST SERPL-MCNC: 162 MG/DL (ref 0–200)
EOSINOPHIL # BLD AUTO: 0.13 10*3/MM3 (ref 0–0.4)
EOSINOPHIL NFR BLD AUTO: 1.6 % (ref 0.3–6.2)
ERYTHROCYTE [DISTWIDTH] IN BLOOD BY AUTOMATED COUNT: 13.7 % (ref 12.3–15.4)
HCT VFR BLD AUTO: 42.7 % (ref 34–46.6)
HDLC SERPL-MCNC: 45 MG/DL (ref 40–60)
HGB BLD-MCNC: 13.2 G/DL (ref 12–15.9)
IMM GRANULOCYTES # BLD AUTO: 0.01 10*3/MM3 (ref 0–0.05)
IMM GRANULOCYTES NFR BLD AUTO: 0.1 % (ref 0–0.5)
LDLC SERPL CALC-MCNC: 95 MG/DL (ref 0–100)
LDLC/HDLC SERPL: 2.12 {RATIO}
LYMPHOCYTES # BLD AUTO: 1.21 10*3/MM3 (ref 0.7–3.1)
LYMPHOCYTES NFR BLD AUTO: 15 % (ref 19.6–45.3)
MCH RBC QN AUTO: 29.7 PG (ref 26.6–33)
MCHC RBC AUTO-ENTMCNC: 30.9 G/DL (ref 31.5–35.7)
MCV RBC AUTO: 96 FL (ref 79–97)
MONOCYTES # BLD AUTO: 0.63 10*3/MM3 (ref 0.1–0.9)
MONOCYTES NFR BLD AUTO: 7.8 % (ref 5–12)
NEUTROPHILS # BLD AUTO: 6.03 10*3/MM3 (ref 1.7–7)
NEUTROPHILS NFR BLD AUTO: 75.1 % (ref 42.7–76)
NRBC BLD AUTO-RTO: 0 /100 WBC (ref 0–0.2)
PLATELET # BLD AUTO: 236 10*3/MM3 (ref 140–450)
RBC # BLD AUTO: 4.45 10*6/MM3 (ref 3.77–5.28)
TRIGL SERPL-MCNC: 109 MG/DL (ref 0–150)
VLDLC SERPL CALC-MCNC: 21.8 MG/DL
WBC # BLD AUTO: 8.04 10*3/MM3 (ref 3.4–10.8)

## 2019-08-15 ENCOUNTER — OFFICE VISIT (OUTPATIENT)
Dept: FAMILY MEDICINE CLINIC | Facility: CLINIC | Age: 79
End: 2019-08-15

## 2019-08-15 VITALS
WEIGHT: 170 LBS | BODY MASS INDEX: 29.02 KG/M2 | SYSTOLIC BLOOD PRESSURE: 130 MMHG | HEIGHT: 64 IN | DIASTOLIC BLOOD PRESSURE: 70 MMHG | HEART RATE: 71 BPM | TEMPERATURE: 97.8 F | RESPIRATION RATE: 18 BRPM | OXYGEN SATURATION: 97 %

## 2019-08-15 DIAGNOSIS — J98.4 RESTRICTIVE LUNG DISEASE: ICD-10-CM

## 2019-08-15 DIAGNOSIS — I10 ESSENTIAL HYPERTENSION: Primary | ICD-10-CM

## 2019-08-15 DIAGNOSIS — E78.5 HYPERLIPIDEMIA, UNSPECIFIED HYPERLIPIDEMIA TYPE: ICD-10-CM

## 2019-08-15 DIAGNOSIS — K21.9 GASTROESOPHAGEAL REFLUX DISEASE, ESOPHAGITIS PRESENCE NOT SPECIFIED: ICD-10-CM

## 2019-08-15 PROBLEM — D50.9 IRON DEFICIENCY ANEMIA: Status: RESOLVED | Noted: 2017-08-17 | Resolved: 2019-08-15

## 2019-08-15 PROCEDURE — 99214 OFFICE O/P EST MOD 30 MIN: CPT | Performed by: INTERNAL MEDICINE

## 2019-08-15 NOTE — PROGRESS NOTES
Juan Manuel Joshua is a 78 y.o. female. Patient is here today for   Chief Complaint   Patient presents with   • Hyperlipidemia   • Hypertension          Vitals:    08/15/19 0828   BP: 130/70   Pulse: 71   Resp: 18   Temp: 97.8 °F (36.6 °C)   SpO2: 97%       The following portions of the patient's history were reviewed and updated as appropriate: allergies, current medications, past family history, past medical history, past social history, past surgical history and problem list.    Past Medical History:   Diagnosis Date   • COPD (chronic obstructive pulmonary disease) (CMS/[facility])    • Depression    • GERD (gastroesophageal reflux disease)    • Hyperglycemia    • Hyperlipidemia    • Hypertension    • Macular degeneration    • Osteoarthritis       No Known Allergies   Social History     Socioeconomic History   • Marital status:      Spouse name: Not on file   • Number of children: Not on file   • Years of education: Not on file   • Highest education level: Not on file   Tobacco Use   • Smoking status: Former Smoker     Packs/day: 1.00     Years: 20.00     Pack years: 20.00     Types: Cigarettes     Last attempt to quit:      Years since quittin.6   • Smokeless tobacco: Never Used   Substance and Sexual Activity   • Alcohol use: Yes     Alcohol/week: 1.2 oz     Types: 1 Glasses of wine, 1 Cans of beer per week   • Drug use: No   • Sexual activity: Defer        Current Outpatient Medications:   •  amLODIPine (NORVASC) 5 MG tablet, Take 1 tablet by mouth 2 (Two) Times a Day., Disp: 180 tablet, Rfl: 0  •  aspirin 81 MG EC tablet, Take 81 mg by mouth Daily., Disp: , Rfl:   •  atorvastatin (LIPITOR) 40 MG tablet, TAKE ONE TABLET BY MOUTH ONCE NIGHTLY, Disp: 90 tablet, Rfl: 0  •  BESIVANCE 0.6 % suspension ophthalmic suspension, SHAKE LQ AND INT 1 GTT IN OD BID, Disp: , Rfl: 1  •  budesonide-formoterol (SYMBICORT) 80-4.5 MCG/ACT inhaler, Inhale 2 puffs 2 (Two) Times a Day., Disp: 1 inhaler, Rfl: 5  •   diclofenac (VOLTAREN) 75 MG EC tablet, Take 1 tablet by mouth 2 (Two) Times a Day With Meals., Disp: 180 tablet, Rfl: 0  •  fluticasone (FLONASE) 50 MCG/ACT nasal spray, 2 sprays into each nostril., Disp: , Rfl:   •  furosemide (LASIX) 20 MG tablet, TAKE ONE TABLET BY MOUTH ONCE DAILY, Disp: 90 tablet, Rfl: 1  •  Glucosamine HCl (GLUCOSAMINE PO), Take 50 mg by mouth Daily., Disp: , Rfl:   •  IRON PO, Take  by mouth., Disp: , Rfl:   •  Multiple Vitamins-Minerals (EYE VITAMINS PO), Take  by mouth., Disp: , Rfl:   •  pantoprazole (PROTONIX) 40 MG EC tablet, TAKE ONE TABLET BY MOUTH DAILY, Disp: 90 tablet, Rfl: 0  •  sertraline (ZOLOFT) 100 MG tablet, TAKE ONE TABLET BY MOUTH DAILY, Disp: 90 tablet, Rfl: 0  •  vitamin D (ERGOCALCIFEROL) 26251 units capsule capsule, TAKE ONE CAPSULE BY MOUTH ONCE WEEKLY, Disp: 12 capsule, Rfl: 0     Objective   History of Present Illness Estela is here for blood pressure lab follow-up.  She has hypertension, hyperlipidemia, gastroesophageal reflux, vitamin D deficiency, and depression.  She feels well.  She cut out sugar in her diet since her last visit.  She does complain of some chronic fatigue.  She eats healthy but does not exercise.  She continues to work 3 days a week and is physically active at work.  Immunizations are up-to-date except for shingles vaccination.  She was having periodic episodes of lightheadedness that resolved after she cut out  sugar.  A Holter monitor in June was normal.  .  Review of Systems   Constitutional:        Weight loss 4 lbs   Respiratory:        PFTs in 2017 showed a mild restrictive defect   Cardiovascular:        's/60   Gastrointestinal:        Colonoscopy and EGD in 2017   Neurological: Negative.    Psychiatric/Behavioral: Negative.        Physical Exam   Constitutional: She appears well-developed and well-nourished.   Neck: Carotid bruit is not present.   Cardiovascular: Normal rate, regular rhythm and normal heart sounds.   115/60    Pulmonary/Chest: Effort normal and breath sounds normal.   Neurological: She is alert.   Psychiatric: She has a normal mood and affect. Her behavior is normal. Judgment and thought content normal.   Vitals reviewed.      ASSESSMENT     Problem List Items Addressed This Visit        Cardiovascular and Mediastinum    Hyperlipidemia    Hypertension - Primary       Respiratory    Restrictive lung disease       Digestive    GERD (gastroesophageal reflux disease)          PLAN  Patient Instructions   Blood pressure is normal.  A complete blood count is normal.  Lipids are normal.  Vitamin D level is normal.  Suggest daily vitamin D supplement discontinued and weekly vitamin D.      Return in about 6 months (around 2/15/2020) for labs CBC, BMP, TSH.

## 2019-08-15 NOTE — PATIENT INSTRUCTIONS
Blood pressure is normal.  A complete blood count is normal.  Lipids are normal.  Vitamin D level is normal.  Suggest daily vitamin D supplement discontinued and weekly vitamin D.

## 2019-08-29 RX ORDER — PANTOPRAZOLE SODIUM 40 MG/1
TABLET, DELAYED RELEASE ORAL
Qty: 90 TABLET | Refills: 0 | Status: SHIPPED | OUTPATIENT
Start: 2019-08-29 | End: 2019-12-24

## 2019-09-25 RX ORDER — ATORVASTATIN CALCIUM 40 MG/1
TABLET, FILM COATED ORAL
Qty: 90 TABLET | Refills: 0 | Status: SHIPPED | OUTPATIENT
Start: 2019-09-25 | End: 2019-12-30

## 2019-09-25 RX ORDER — SERTRALINE HYDROCHLORIDE 100 MG/1
TABLET, FILM COATED ORAL
Qty: 90 TABLET | Refills: 0 | Status: SHIPPED | OUTPATIENT
Start: 2019-09-25 | End: 2019-12-30

## 2019-09-25 RX ORDER — DICLOFENAC SODIUM 75 MG/1
TABLET, DELAYED RELEASE ORAL
Qty: 180 TABLET | Refills: 0 | Status: SHIPPED | OUTPATIENT
Start: 2019-09-25 | End: 2019-12-30

## 2019-10-14 ENCOUNTER — OFFICE VISIT (OUTPATIENT)
Dept: FAMILY MEDICINE CLINIC | Facility: CLINIC | Age: 79
End: 2019-10-14

## 2019-10-14 VITALS
HEART RATE: 68 BPM | SYSTOLIC BLOOD PRESSURE: 114 MMHG | BODY MASS INDEX: 27.49 KG/M2 | HEIGHT: 64 IN | DIASTOLIC BLOOD PRESSURE: 56 MMHG | TEMPERATURE: 98.2 F | OXYGEN SATURATION: 97 % | WEIGHT: 161 LBS | RESPIRATION RATE: 18 BRPM

## 2019-10-14 DIAGNOSIS — Z00.00 MEDICARE ANNUAL WELLNESS VISIT, SUBSEQUENT: Primary | ICD-10-CM

## 2019-10-14 DIAGNOSIS — Z23 NEED FOR INFLUENZA VACCINATION: ICD-10-CM

## 2019-10-14 PROCEDURE — G0008 ADMIN INFLUENZA VIRUS VAC: HCPCS | Performed by: NURSE PRACTITIONER

## 2019-10-14 PROCEDURE — 90653 IIV ADJUVANT VACCINE IM: CPT | Performed by: NURSE PRACTITIONER

## 2019-10-14 PROCEDURE — G0439 PPPS, SUBSEQ VISIT: HCPCS | Performed by: NURSE PRACTITIONER

## 2019-10-14 NOTE — PROGRESS NOTES
The ABCs of the Annual Wellness Visit  Subsequent Medicare Wellness Visit    Chief Complaint   Patient presents with   • Medicare Wellness-subsequent       Subjective   History of Present Illness:  Esetla Joshua is a 78 y.o. female who presents for a Subsequent Medicare Wellness Visit.    HEALTH RISK ASSESSMENT    Recent Hospitalizations:  No hospitalization(s) within the last year.    Current Medical Providers:  Patient Care Team:  Robert Miner MD as PCP - General  Robert Miner MD as PCP - Family Medicine    Smoking Status:  Social History     Tobacco Use   Smoking Status Former Smoker   • Packs/day: 1.00   • Years: 20.00   • Pack years: 20.00   • Types: Cigarettes   • Last attempt to quit:    • Years since quittin.8   Smokeless Tobacco Never Used       Alcohol Consumption:  Social History     Substance and Sexual Activity   Alcohol Use Yes   • Alcohol/week: 1.2 oz   • Types: 1 Glasses of wine, 1 Cans of beer per week       Depression Screen:   PHQ-2/PHQ-9 Depression Screening 10/14/2019   Little interest or pleasure in doing things 0   Feeling down, depressed, or hopeless 0   Total Score 0       Fall Risk Screen:  STEADI Fall Risk Assessment was completed, and patient is at LOW risk for falls.Assessment completed on:10/14/2019    Health Habits and Functional and Cognitive Screening:  Functional & Cognitive Status 10/14/2019   Do you have difficulty preparing food and eating? No   Do you have difficulty bathing yourself, getting dressed or grooming yourself? No   Do you have difficulty using the toilet? No   Do you have difficulty moving around from place to place? No   Do you have trouble with steps or getting out of a bed or a chair? No   Current Diet Well Balanced Diet   Dental Exam Up to date   Eye Exam Up to date   Exercise (times per week) 4 times per week   Current Exercise Activities Include Walking   Do you need help using the phone?  No   Are you deaf or do you have serious difficulty  hearing?  No   Do you need help with transportation? No   Do you need help shopping? No   Do you need help preparing meals?  No   Do you need help with housework?  No   Do you need help with laundry? No   Do you need help taking your medications? No   Do you need help managing money? No   Do you ever drive or ride in a car without wearing a seat belt? Yes   Have you felt unusual stress, anger or loneliness in the last month? No   Who do you live with? Child   If you need help, do you have trouble finding someone available to you? No   Have you been bothered in the last four weeks by sexual problems? No   Do you have difficulty concentrating, remembering or making decisions? No         Does the patient have evidence of cognitive impairment? No    Asprin use counseling:Taking ASA appropriately as indicated    Age-appropriate Screening Schedule:  Refer to the list below for future screening recommendations based on patient's age, sex and/or medical conditions. Orders for these recommended tests are listed in the plan section. The patient has been provided with a written plan.    Health Maintenance   Topic Date Due   • ZOSTER VACCINE (2 of 3) 02/26/2014   • INFLUENZA VACCINE  08/01/2019   • LIPID PANEL  08/08/2020   • MAMMOGRAM  03/18/2021   • COLONOSCOPY  09/01/2027   • TDAP/TD VACCINES (2 - Td) 09/01/2028   • PNEUMOCOCCAL VACCINES (65+ LOW/MEDIUM RISK)  Completed          The following portions of the patient's history were reviewed and updated as appropriate: allergies, current medications, past family history, past medical history, past social history, past surgical history and problem list.    Outpatient Medications Prior to Visit   Medication Sig Dispense Refill   • amLODIPine (NORVASC) 5 MG tablet Take 1 tablet by mouth 2 (Two) Times a Day. 180 tablet 0   • aspirin 81 MG EC tablet Take 81 mg by mouth Daily.     • atorvastatin (LIPITOR) 40 MG tablet TAKE ONE TABLET BY MOUTH ONCE NIGHTLY 90 tablet 0   • BESIVANCE  0.6 % suspension ophthalmic suspension SHAKE LQ AND INT 1 GTT IN OD BID  1   • budesonide-formoterol (SYMBICORT) 80-4.5 MCG/ACT inhaler Inhale 2 puffs 2 (Two) Times a Day. 1 inhaler 5   • diclofenac (VOLTAREN) 75 MG EC tablet TAKE ONE TABLET BY MOUTH TWICE A DAY WITH MEALS 180 tablet 0   • fluticasone (FLONASE) 50 MCG/ACT nasal spray 2 sprays into each nostril.     • furosemide (LASIX) 20 MG tablet TAKE ONE TABLET BY MOUTH ONCE DAILY 90 tablet 1   • Glucosamine HCl (GLUCOSAMINE PO) Take 50 mg by mouth Daily.     • IRON PO Take  by mouth.     • Multiple Vitamins-Minerals (EYE VITAMINS PO) Take  by mouth.     • pantoprazole (PROTONIX) 40 MG EC tablet TAKE ONE TABLET BY MOUTH DAILY 90 tablet 0   • sertraline (ZOLOFT) 100 MG tablet TAKE ONE TABLET BY MOUTH DAILY 90 tablet 0   • vitamin D (ERGOCALCIFEROL) 40842 units capsule capsule TAKE ONE CAPSULE BY MOUTH ONCE WEEKLY 12 capsule 0     No facility-administered medications prior to visit.        Patient Active Problem List   Diagnosis   • Depression   • Hyperlipidemia   • Hypertension   • Visual disturbance   • Vitamin D deficiency   • Imbalance   • Impacted cerumen of right ear   • Shortness of breath on exertion   • Edema   • Restrictive lung disease   • Arthralgia   • GERD (gastroesophageal reflux disease)   • Arthritis of left knee   • Arthritis of knee, right   • Chronic bilateral low back pain without sciatica   • Episodic lightheadedness       Advanced Care Planning:  Patient has an advance directive - a copy has not been provided. Have asked the patient to send this to us to add to record    Review of Systems    Compared to one year ago, the patient feels her physical health is the same.  Compared to one year ago, the patient feels her mental health is the same.    Reviewed chart for potential of high risk medication in the elderly: yes  Reviewed chart for potential of harmful drug interactions in the elderly:yes    Objective         Vitals:    10/14/19 1023  "  BP: 114/56   Pulse: 68   Resp: 18   Temp: 98.2 °F (36.8 °C)   TempSrc: Oral   SpO2: 97%   Weight: 73 kg (161 lb)   Height: 162.6 cm (64\")   PainSc:   7   PainLoc: Leg       Body mass index is 27.64 kg/m².  Discussed the patient's BMI with her. The BMI is above average .    Physical Exam    Lab Results   Component Value Date    CHLPL 162 08/08/2019    TRIG 109 08/08/2019    HDL 45 08/08/2019    LDL 95 08/08/2019    VLDL 21.8 08/08/2019        Assessment/Plan   Medicare Risks and Personalized Health Plan  CMS Preventative Services Quick Reference  Cardiovascular risk  Fall Risk  Immunizations Discussed/Encouraged (specific immunizations; Influenza )  Obesity/Overweight     The above risks/problems have been discussed with the patient.  Pertinent information has been shared with the patient in the After Visit Summary.  Follow up plans and orders are seen below in the Assessment/Plan Section.    Diagnoses and all orders for this visit:    1. Medicare annual wellness visit, subsequent (Primary)    2. Need for influenza vaccination  -     Fluad Quad >65 years      Follow Up:  No Follow-up on file.     An After Visit Summary and PPPS were given to the patient.             "

## 2019-12-24 RX ORDER — PANTOPRAZOLE SODIUM 40 MG/1
TABLET, DELAYED RELEASE ORAL
Qty: 90 TABLET | Refills: 0 | Status: SHIPPED | OUTPATIENT
Start: 2019-12-24 | End: 2020-04-06

## 2019-12-30 RX ORDER — DICLOFENAC SODIUM 75 MG/1
TABLET, DELAYED RELEASE ORAL
Qty: 180 TABLET | Refills: 0 | Status: SHIPPED | OUTPATIENT
Start: 2019-12-30 | End: 2020-04-30 | Stop reason: SDUPTHER

## 2019-12-30 RX ORDER — ATORVASTATIN CALCIUM 40 MG/1
TABLET, FILM COATED ORAL
Qty: 90 TABLET | Refills: 0 | Status: SHIPPED | OUTPATIENT
Start: 2019-12-30 | End: 2020-04-24 | Stop reason: SDUPTHER

## 2019-12-30 RX ORDER — SERTRALINE HYDROCHLORIDE 100 MG/1
TABLET, FILM COATED ORAL
Qty: 90 TABLET | Refills: 0 | Status: SHIPPED | OUTPATIENT
Start: 2019-12-30 | End: 2020-04-14 | Stop reason: SDUPTHER

## 2020-01-30 DIAGNOSIS — I10 ESSENTIAL HYPERTENSION: ICD-10-CM

## 2020-02-17 RX ORDER — AMLODIPINE BESYLATE 5 MG/1
TABLET ORAL
Qty: 180 TABLET | Refills: 0 | Status: SHIPPED | OUTPATIENT
Start: 2020-02-17 | End: 2020-06-15

## 2020-03-13 LAB
ALBUMIN SERPL-MCNC: 4.2 G/DL (ref 3.5–5.2)
ALBUMIN/GLOB SERPL: 2.1 G/DL
ALP SERPL-CCNC: 52 U/L (ref 39–117)
ALT SERPL-CCNC: 19 U/L (ref 1–33)
AST SERPL-CCNC: 16 U/L (ref 1–32)
BASOPHILS # BLD AUTO: 0.04 10*3/MM3 (ref 0–0.2)
BASOPHILS NFR BLD AUTO: 0.6 % (ref 0–1.5)
BILIRUB SERPL-MCNC: 0.4 MG/DL (ref 0.2–1.2)
BUN SERPL-MCNC: 25 MG/DL (ref 8–23)
BUN/CREAT SERPL: 30.9 (ref 7–25)
CALCIUM SERPL-MCNC: 9.1 MG/DL (ref 8.6–10.5)
CHLORIDE SERPL-SCNC: 107 MMOL/L (ref 98–107)
CO2 SERPL-SCNC: 26.3 MMOL/L (ref 22–29)
CREAT SERPL-MCNC: 0.81 MG/DL (ref 0.57–1)
EOSINOPHIL # BLD AUTO: 0.07 10*3/MM3 (ref 0–0.4)
EOSINOPHIL NFR BLD AUTO: 1 % (ref 0.3–6.2)
ERYTHROCYTE [DISTWIDTH] IN BLOOD BY AUTOMATED COUNT: 12.9 % (ref 12.3–15.4)
GLOBULIN SER CALC-MCNC: 2 GM/DL
GLUCOSE SERPL-MCNC: 93 MG/DL (ref 65–99)
HCT VFR BLD AUTO: 38 % (ref 34–46.6)
HGB BLD-MCNC: 12.7 G/DL (ref 12–15.9)
IMM GRANULOCYTES # BLD AUTO: 0.02 10*3/MM3 (ref 0–0.05)
IMM GRANULOCYTES NFR BLD AUTO: 0.3 % (ref 0–0.5)
LYMPHOCYTES # BLD AUTO: 1.25 10*3/MM3 (ref 0.7–3.1)
LYMPHOCYTES NFR BLD AUTO: 17.7 % (ref 19.6–45.3)
MCH RBC QN AUTO: 30.8 PG (ref 26.6–33)
MCHC RBC AUTO-ENTMCNC: 33.4 G/DL (ref 31.5–35.7)
MCV RBC AUTO: 92.2 FL (ref 79–97)
MONOCYTES # BLD AUTO: 0.53 10*3/MM3 (ref 0.1–0.9)
MONOCYTES NFR BLD AUTO: 7.5 % (ref 5–12)
NEUTROPHILS # BLD AUTO: 5.17 10*3/MM3 (ref 1.7–7)
NEUTROPHILS NFR BLD AUTO: 72.9 % (ref 42.7–76)
NRBC BLD AUTO-RTO: 0 /100 WBC (ref 0–0.2)
PLATELET # BLD AUTO: 200 10*3/MM3 (ref 140–450)
POTASSIUM SERPL-SCNC: 4.3 MMOL/L (ref 3.5–5.2)
PROT SERPL-MCNC: 6.2 G/DL (ref 6–8.5)
RBC # BLD AUTO: 4.12 10*6/MM3 (ref 3.77–5.28)
SODIUM SERPL-SCNC: 146 MMOL/L (ref 136–145)
TSH SERPL DL<=0.005 MIU/L-ACNC: 2.44 UIU/ML (ref 0.27–4.2)
WBC # BLD AUTO: 7.08 10*3/MM3 (ref 3.4–10.8)

## 2020-03-16 ENCOUNTER — OFFICE VISIT (OUTPATIENT)
Dept: FAMILY MEDICINE CLINIC | Facility: CLINIC | Age: 80
End: 2020-03-16

## 2020-03-16 VITALS
RESPIRATION RATE: 16 BRPM | BODY MASS INDEX: 25.95 KG/M2 | DIASTOLIC BLOOD PRESSURE: 60 MMHG | HEIGHT: 64 IN | TEMPERATURE: 97.9 F | OXYGEN SATURATION: 98 % | WEIGHT: 152 LBS | SYSTOLIC BLOOD PRESSURE: 110 MMHG | HEART RATE: 60 BPM

## 2020-03-16 DIAGNOSIS — J98.4 RESTRICTIVE LUNG DISEASE: ICD-10-CM

## 2020-03-16 DIAGNOSIS — I10 ESSENTIAL HYPERTENSION: Primary | ICD-10-CM

## 2020-03-16 DIAGNOSIS — J30.9 ALLERGIC RHINITIS, UNSPECIFIED SEASONALITY, UNSPECIFIED TRIGGER: ICD-10-CM

## 2020-03-16 DIAGNOSIS — E78.5 HYPERLIPIDEMIA, UNSPECIFIED HYPERLIPIDEMIA TYPE: ICD-10-CM

## 2020-03-16 PROCEDURE — 99214 OFFICE O/P EST MOD 30 MIN: CPT | Performed by: INTERNAL MEDICINE

## 2020-03-16 RX ORDER — BUDESONIDE AND FORMOTEROL FUMARATE DIHYDRATE 80; 4.5 UG/1; UG/1
2 AEROSOL RESPIRATORY (INHALATION)
Qty: 3 INHALER | Refills: 3 | Status: SHIPPED | OUTPATIENT
Start: 2020-03-16 | End: 2020-04-24 | Stop reason: SDUPTHER

## 2020-03-16 RX ORDER — FLUTICASONE PROPIONATE 50 MCG
2 SPRAY, SUSPENSION (ML) NASAL 2 TIMES DAILY
Qty: 3 BOTTLE | Refills: 3 | Status: SHIPPED | OUTPATIENT
Start: 2020-03-16 | End: 2020-04-04 | Stop reason: SDUPTHER

## 2020-03-16 NOTE — PATIENT INSTRUCTIONS
Blood pressure is normal.  Fasting blood sugar is now normal.  Kidney and liver functions are normal.  A complete blood count and thyroid-stimulating hormone level are normal.  Continue diet and current medicines.

## 2020-03-16 NOTE — PROGRESS NOTES
Juan Manuel Joshua is a 79 y.o. female. Patient is here today for   Chief Complaint   Patient presents with   • Hypertension   • Hyperlipidemia          Vitals:    20 0929   BP: 110/60   Pulse: 60   Resp: 16   Temp: 97.9 °F (36.6 °C)   SpO2: 98%     Body mass index is 26.09 kg/m².      The following portions of the patient's history were reviewed and updated as appropriate: allergies, current medications, past family history, past medical history, past social history, past surgical history and problem list.    Past Medical History:   Diagnosis Date   • COPD (chronic obstructive pulmonary disease) (CMS/[facility])    • Depression    • GERD (gastroesophageal reflux disease)    • Hyperglycemia    • Hyperlipidemia    • Hypertension    • Macular degeneration    • Osteoarthritis       No Known Allergies   Social History     Socioeconomic History   • Marital status:      Spouse name: Not on file   • Number of children: Not on file   • Years of education: Not on file   • Highest education level: Not on file   Tobacco Use   • Smoking status: Former Smoker     Packs/day: 1.00     Years: 20.00     Pack years: 20.00     Types: Cigarettes     Last attempt to quit:      Years since quittin.2   • Smokeless tobacco: Never Used   Substance and Sexual Activity   • Alcohol use: Yes     Alcohol/week: 2.0 standard drinks     Types: 1 Glasses of wine, 1 Cans of beer per week   • Drug use: No   • Sexual activity: Defer        Current Outpatient Medications:   •  amLODIPine (NORVASC) 5 MG tablet, TAKE ONE TABLET BY MOUTH TWICE A DAY, Disp: 180 tablet, Rfl: 0  •  aspirin 81 MG EC tablet, Take 81 mg by mouth Daily., Disp: , Rfl:   •  atorvastatin (LIPITOR) 40 MG tablet, TAKE ONE TABLET BY MOUTH ONCE NIGHTLY, Disp: 90 tablet, Rfl: 0  •  BESIVANCE 0.6 % suspension ophthalmic suspension, SHAKE LQ AND INT 1 GTT IN OD BID, Disp: , Rfl: 1  •  budesonide-formoterol (SYMBICORT) 80-4.5 MCG/ACT inhaler, Inhale 2 puffs 2 (Two)  Times a Day., Disp: 3 inhaler, Rfl: 3  •  diclofenac (VOLTAREN) 75 MG EC tablet, TAKE ONE TABLET BY MOUTH TWICE A DAY WITH MEALS, Disp: 180 tablet, Rfl: 0  •  fluticasone (FLONASE) 50 MCG/ACT nasal spray, 2 sprays into the nostril(s) as directed by provider 2 (Two) Times a Day., Disp: 3 bottle, Rfl: 3  •  furosemide (LASIX) 20 MG tablet, TAKE ONE TABLET BY MOUTH ONCE DAILY, Disp: 90 tablet, Rfl: 1  •  Glucosamine HCl (GLUCOSAMINE PO), Take 50 mg by mouth Daily., Disp: , Rfl:   •  IRON PO, Take  by mouth., Disp: , Rfl:   •  Multiple Vitamins-Minerals (EYE VITAMINS PO), Take  by mouth., Disp: , Rfl:   •  pantoprazole (PROTONIX) 40 MG EC tablet, TAKE ONE TABLET BY MOUTH DAILY, Disp: 90 tablet, Rfl: 0  •  sertraline (ZOLOFT) 100 MG tablet, TAKE ONE TABLET BY MOUTH DAILY, Disp: 90 tablet, Rfl: 0  •  vitamin D (ERGOCALCIFEROL) 34168 units capsule capsule, TAKE ONE CAPSULE BY MOUTH ONCE WEEKLY, Disp: 12 capsule, Rfl: 0     Objective   History of Present Illness Estela is here for blood pressure lab follow-up.  She has hypertension, elevated fasting glucose, history of vitamin D deficiency, hyperlipidemia, COPD, and allergic rhinitis.  She recently traveled from [place] and complains of postnasal drip and a cough.  She has allergies this time a year.  She does take loratadine 10 mg daily and also uses fluticasone nasal spray.  She denies fever or headache or myalgias.  She has been eating much healthier in the last 6 months and has lost a significant amount of weight.  She has eliminated sugar from her diet.  Review of Systems   Constitutional: Negative for fatigue and fever.        Weight loss 22 lbs   HENT: Positive for postnasal drip.    Respiratory: Positive for cough.    Cardiovascular: Negative.    Neurological: Negative.    Psychiatric/Behavioral: Negative.        Physical Exam   Constitutional: She appears well-developed and well-nourished.   Neck: Carotid bruit is not present.   Cardiovascular: Normal rate, regular  rhythm and normal heart sounds.   Musculoskeletal: She exhibits no edema.   Psychiatric: She has a normal mood and affect. Her behavior is normal. Judgment and thought content normal.   Vitals reviewed.      ASSESSMENT     Problem List Items Addressed This Visit        Cardiovascular and Mediastinum    Hyperlipidemia    Hypertension - Primary       Respiratory    Restrictive lung disease    Relevant Medications    fluticasone (FLONASE) 50 MCG/ACT nasal spray    budesonide-formoterol (SYMBICORT) 80-4.5 MCG/ACT inhaler    Allergic rhinitis          PLAN  Patient Instructions   Blood pressure is normal.  Fasting blood sugar is now normal.  Kidney and liver functions are normal.  A complete blood count and thyroid-stimulating hormone level are normal.  Continue diet and current medicines.      Return in about 6 months (around 9/16/2020) for labs BMP, lipid, vitamin D.

## 2020-04-06 RX ORDER — PANTOPRAZOLE SODIUM 40 MG/1
TABLET, DELAYED RELEASE ORAL
Qty: 90 TABLET | Refills: 0 | Status: SHIPPED | OUTPATIENT
Start: 2020-04-06 | End: 2020-07-03 | Stop reason: SDUPTHER

## 2020-04-06 RX ORDER — PANTOPRAZOLE SODIUM 40 MG/1
40 TABLET, DELAYED RELEASE ORAL DAILY
Qty: 90 TABLET | Refills: 0 | Status: SHIPPED | OUTPATIENT
Start: 2020-04-06 | End: 2020-09-29 | Stop reason: SDUPTHER

## 2020-04-06 RX ORDER — FLUTICASONE PROPIONATE 50 MCG
2 SPRAY, SUSPENSION (ML) NASAL 2 TIMES DAILY
Qty: 3 BOTTLE | Refills: 3 | Status: SHIPPED | OUTPATIENT
Start: 2020-04-06 | End: 2020-07-03 | Stop reason: SDUPTHER

## 2020-04-14 RX ORDER — SERTRALINE HYDROCHLORIDE 100 MG/1
100 TABLET, FILM COATED ORAL DAILY
Qty: 90 TABLET | Refills: 0 | Status: SHIPPED | OUTPATIENT
Start: 2020-04-14 | End: 2020-07-27

## 2020-04-24 RX ORDER — ATORVASTATIN CALCIUM 40 MG/1
40 TABLET, FILM COATED ORAL NIGHTLY
Qty: 90 TABLET | Refills: 0 | Status: SHIPPED | OUTPATIENT
Start: 2020-04-24 | End: 2020-07-27

## 2020-04-24 RX ORDER — BUDESONIDE AND FORMOTEROL FUMARATE DIHYDRATE 80; 4.5 UG/1; UG/1
2 AEROSOL RESPIRATORY (INHALATION)
Qty: 3 INHALER | Refills: 3 | Status: SHIPPED | OUTPATIENT
Start: 2020-04-24 | End: 2020-09-24 | Stop reason: SDUPTHER

## 2020-05-01 RX ORDER — DICLOFENAC SODIUM 75 MG/1
75 TABLET, DELAYED RELEASE ORAL 2 TIMES DAILY WITH MEALS
Qty: 180 TABLET | Refills: 0 | Status: SHIPPED | OUTPATIENT
Start: 2020-05-01 | End: 2020-08-17 | Stop reason: SDUPTHER

## 2020-06-12 ENCOUNTER — PATIENT OUTREACH (OUTPATIENT)
Dept: CASE MANAGEMENT | Facility: OTHER | Age: 80
End: 2020-06-12

## 2020-06-12 NOTE — OUTREACH NOTE
LVM for pt to return call to schedule AWKEE Maxwell  [facility] Clinical Coordinator  [phone]

## 2020-06-15 RX ORDER — AMLODIPINE BESYLATE 5 MG/1
TABLET ORAL
Qty: 180 TABLET | Refills: 0 | Status: SHIPPED | OUTPATIENT
Start: 2020-06-15 | End: 2020-09-24 | Stop reason: SDUPTHER

## 2020-06-15 NOTE — OUTREACH NOTE
LVM for pt to return call to schedule AWKEE Maxwell  [facility] Clinical Coordinator  [phone]

## 2020-06-16 NOTE — OUTREACH NOTE
LVM for pt to return call to schedule AWKEE Maxwell  [facility] Clinical Coordinator  [phone]

## 2020-06-16 NOTE — OUTREACH NOTE
AWV [facility] 10/15/20    KEE Herrera  Network Clinical Coordinator  [phone]

## 2020-07-06 RX ORDER — PANTOPRAZOLE SODIUM 40 MG/1
40 TABLET, DELAYED RELEASE ORAL DAILY
Qty: 90 TABLET | Refills: 0 | Status: SHIPPED | OUTPATIENT
Start: 2020-07-06 | End: 2020-09-29 | Stop reason: SDUPTHER

## 2020-07-06 RX ORDER — FLUTICASONE PROPIONATE 50 MCG
2 SPRAY, SUSPENSION (ML) NASAL 2 TIMES DAILY
Qty: 3 BOTTLE | Refills: 3 | Status: SHIPPED | OUTPATIENT
Start: 2020-07-06 | End: 2020-09-29 | Stop reason: SDUPTHER

## 2020-07-06 RX ORDER — FUROSEMIDE 20 MG/1
20 TABLET ORAL DAILY
Qty: 90 TABLET | Refills: 1 | Status: SHIPPED | OUTPATIENT
Start: 2020-07-06 | End: 2021-04-21 | Stop reason: SDUPTHER

## 2020-07-06 RX ORDER — FLUTICASONE PROPIONATE 50 MCG
2 SPRAY, SUSPENSION (ML) NASAL 2 TIMES DAILY
Qty: 3 BOTTLE | Refills: 3 | Status: SHIPPED | OUTPATIENT
Start: 2020-07-06 | End: 2020-10-12 | Stop reason: SDUPTHER

## 2020-07-06 RX ORDER — PANTOPRAZOLE SODIUM 40 MG/1
40 TABLET, DELAYED RELEASE ORAL DAILY
Qty: 90 TABLET | Refills: 0 | Status: SHIPPED | OUTPATIENT
Start: 2020-07-06 | End: 2020-10-22 | Stop reason: SDUPTHER

## 2020-07-27 RX ORDER — SERTRALINE HYDROCHLORIDE 100 MG/1
TABLET, FILM COATED ORAL
Qty: 90 TABLET | Refills: 0 | Status: SHIPPED | OUTPATIENT
Start: 2020-07-27 | End: 2020-10-22 | Stop reason: SDUPTHER

## 2020-07-27 RX ORDER — ATORVASTATIN CALCIUM 40 MG/1
TABLET, FILM COATED ORAL
Qty: 90 TABLET | Refills: 0 | Status: SHIPPED | OUTPATIENT
Start: 2020-07-27 | End: 2020-10-22 | Stop reason: SDUPTHER

## 2020-08-17 RX ORDER — DICLOFENAC SODIUM 75 MG/1
75 TABLET, DELAYED RELEASE ORAL 2 TIMES DAILY WITH MEALS
Qty: 180 TABLET | Refills: 0 | Status: SHIPPED | OUTPATIENT
Start: 2020-08-17 | End: 2021-02-01 | Stop reason: SDUPTHER

## 2020-09-02 DIAGNOSIS — E78.5 HYPERLIPIDEMIA, UNSPECIFIED HYPERLIPIDEMIA TYPE: Primary | ICD-10-CM

## 2020-09-02 DIAGNOSIS — E55.9 VITAMIN D DEFICIENCY: ICD-10-CM

## 2020-09-02 DIAGNOSIS — I10 ESSENTIAL HYPERTENSION: ICD-10-CM

## 2020-09-22 LAB
25(OH)D3+25(OH)D2 SERPL-MCNC: 38.5 NG/ML (ref 30–100)
BUN SERPL-MCNC: 18 MG/DL (ref 8–23)
BUN/CREAT SERPL: 26.1 (ref 7–25)
CALCIUM SERPL-MCNC: 9 MG/DL (ref 8.6–10.5)
CHLORIDE SERPL-SCNC: 105 MMOL/L (ref 98–107)
CHOLEST SERPL-MCNC: 180 MG/DL (ref 0–200)
CO2 SERPL-SCNC: 27.8 MMOL/L (ref 22–29)
CREAT SERPL-MCNC: 0.69 MG/DL (ref 0.57–1)
GLUCOSE SERPL-MCNC: 92 MG/DL (ref 65–99)
HDLC SERPL-MCNC: 58 MG/DL (ref 40–60)
LDLC SERPL CALC-MCNC: 108 MG/DL (ref 0–100)
LDLC/HDLC SERPL: 1.86 {RATIO}
POTASSIUM SERPL-SCNC: 3.9 MMOL/L (ref 3.5–5.2)
SODIUM SERPL-SCNC: 142 MMOL/L (ref 136–145)
TRIGL SERPL-MCNC: 72 MG/DL (ref 0–150)
VLDLC SERPL CALC-MCNC: 14.4 MG/DL

## 2020-09-22 RX ORDER — PANTOPRAZOLE SODIUM 40 MG/1
40 TABLET, DELAYED RELEASE ORAL DAILY
Qty: 90 TABLET | Refills: 0 | OUTPATIENT
Start: 2020-09-22

## 2020-09-22 RX ORDER — AMLODIPINE BESYLATE 5 MG/1
5 TABLET ORAL 2 TIMES DAILY
Qty: 180 TABLET | Refills: 0 | OUTPATIENT
Start: 2020-09-22

## 2020-09-24 RX ORDER — AMLODIPINE BESYLATE 5 MG/1
5 TABLET ORAL 2 TIMES DAILY
Qty: 180 TABLET | Refills: 0 | Status: SHIPPED | OUTPATIENT
Start: 2020-09-24 | End: 2020-09-29 | Stop reason: SDUPTHER

## 2020-09-24 RX ORDER — BUDESONIDE AND FORMOTEROL FUMARATE DIHYDRATE 80; 4.5 UG/1; UG/1
2 AEROSOL RESPIRATORY (INHALATION)
Qty: 3 INHALER | Refills: 3 | Status: SHIPPED | OUTPATIENT
Start: 2020-09-24 | End: 2021-11-02 | Stop reason: SDUPTHER

## 2020-09-29 ENCOUNTER — OFFICE VISIT (OUTPATIENT)
Dept: FAMILY MEDICINE CLINIC | Facility: CLINIC | Age: 80
End: 2020-09-29

## 2020-09-29 VITALS
HEART RATE: 66 BPM | SYSTOLIC BLOOD PRESSURE: 124 MMHG | RESPIRATION RATE: 18 BRPM | OXYGEN SATURATION: 98 % | BODY MASS INDEX: 25.61 KG/M2 | DIASTOLIC BLOOD PRESSURE: 72 MMHG | TEMPERATURE: 97.3 F | WEIGHT: 150 LBS | HEIGHT: 64 IN

## 2020-09-29 DIAGNOSIS — M25.50 ARTHRALGIA, UNSPECIFIED JOINT: ICD-10-CM

## 2020-09-29 DIAGNOSIS — E55.9 VITAMIN D DEFICIENCY: ICD-10-CM

## 2020-09-29 DIAGNOSIS — F32.89 OTHER DEPRESSION: ICD-10-CM

## 2020-09-29 DIAGNOSIS — Z23 NEED FOR VACCINATION: ICD-10-CM

## 2020-09-29 DIAGNOSIS — I10 ESSENTIAL HYPERTENSION: Primary | ICD-10-CM

## 2020-09-29 DIAGNOSIS — K21.9 GASTROESOPHAGEAL REFLUX DISEASE, ESOPHAGITIS PRESENCE NOT SPECIFIED: ICD-10-CM

## 2020-09-29 DIAGNOSIS — E78.5 HYPERLIPIDEMIA, UNSPECIFIED HYPERLIPIDEMIA TYPE: ICD-10-CM

## 2020-09-29 PROCEDURE — 99214 OFFICE O/P EST MOD 30 MIN: CPT | Performed by: INTERNAL MEDICINE

## 2020-09-29 PROCEDURE — G0008 ADMIN INFLUENZA VIRUS VAC: HCPCS | Performed by: INTERNAL MEDICINE

## 2020-09-29 PROCEDURE — 90694 VACC AIIV4 NO PRSRV 0.5ML IM: CPT | Performed by: INTERNAL MEDICINE

## 2020-09-29 RX ORDER — AMLODIPINE BESYLATE 5 MG/1
5 TABLET ORAL 2 TIMES DAILY
Qty: 180 TABLET | Refills: 3 | Status: SHIPPED | OUTPATIENT
Start: 2020-09-29 | End: 2021-04-06 | Stop reason: SDUPTHER

## 2020-09-29 NOTE — ADDENDUM NOTE
Addended by: VENANCIO PHELAN on: 9/29/2020 09:30 AM     Modules accepted: Orders     The patient is a  61 yo M with HTN, glaucoma, gout, alcohol abuse and RA who presented with slurred speech, confusion, R facial droop and word-finding difficulty (NIH scale 4). He had a STAT CT head, which was negative for intracranial hemorrhage and tPA was given at 18:41PM on 4/7, with subsequent post-tPA monitoring in the MICU. A repeat CT head 24 hrs later showed no change (no hydrocephalus, midline shift, acute intracranial hemorrhage or demarcated territorial infarct). The patient was then started on ASA and HSQ. The patient was then found to exhibit delirious behaviour with agitation, and information about the patient's hx of EtOH abuse was obtained. The patient was then started on IV thiamine therapy, prn ativan and prn Seroquel. The patient was found to have normal B 12, folate, and TSH levels with negative syphilis screen. Over the weekend, the patient was agitated, fell and hit his head. He had a stat CT head/neck/maxillofacial bones/cervical spine, which were negative for acute pathology. He had a 0.5 cm-1 cm depth with 2 cm length laceration above his L eye, and plastics sutured the lac. The patient was stepped down to 7 lach for monitoring, and he remained medically stable, thus he was stepped down to the floors. The patient had an MRI head, which showed no intracranial hemorrhage, mass, or acute/subacute infarct, though the test was limited by movement. The patient is stable for discharge to [facility], and he should followup outpatient with Neurology for followup and workup of his progressive cognitive decline over the past year. The patient is a  63 yo M with HTN, glaucoma, gout, alcohol abuse and RA who presented with slurred speech, confusion, R facial droop and word-finding difficulty (NIH scale 4). He had a STAT CT head, which was negative for intracranial hemorrhage and tPA was given at 18:41PM on 4/7, with subsequent post-tPA monitoring in the MICU. A repeat CT head 24 hrs later showed no change (no hydrocephalus, midline shift, acute intracranial hemorrhage or demarcated territorial infarct). The patient was then started on ASA and HSQ. The patient was then found to exhibit delirious behaviour with agitation, and information about the patient's hx of EtOH abuse was obtained. The patient was then started on IV thiamine therapy, prn ativan and prn Seroquel. The patient was found to have normal B 12, folate, and TSH levels with negative syphilis screen. Over the weekend, the patient was agitated, fell and hit his head. He had a stat CT head/neck/maxillofacial bones/cervical spine, which were negative for acute pathology. He had a 0.5 cm-1 cm depth with 2 cm length laceration above his L eye, and plastics sutured the lac. The patient was stepped down to 7 lach for monitoring, and he remained medically stable, thus he was stepped down to the floors. The patient had an MRI head, which showed no intracranial hemorrhage, mass, or acute/subacute infarct, though the test was limited by movement. However, it was concluded the patient's symptoms were likely not 2/2 a stroke. Thus, the patient had a VEEG, which was _____. Psych evaluated the patient and _____. The patient is stable for discharge to [facility], and he should followup outpatient with Neurology for followup and workup of his progressive cognitive decline over the past year. The patient is a  61 yo M with HTN, glaucoma, gout, alcohol abuse and RA who presented with slurred speech, confusionand word-finding difficulty (NIH scale 4). He had a STAT CT head, which was negative for intracranial hemorrhage and tPA was given at 18:41PM on 4/7, with subsequent post-tPA monitoring in the MICU. A repeat CT head 24 hrs later showed no change (no hydrocephalus, midline shift, acute intracranial hemorrhage or demarcated territorial infarct). The patient was then started on ASA and HSQ. The patient was then found to exhibit delirious behaviour with agitation, and information about the patient's hx of EtOH abuse was obtained. The patient was then started on IV thiamine therapy, prn ativan and prn Seroquel. The patient was found to have normal B 12, folate, and TSH levels with negative syphilis screen. Over the weekend, the patient was agitated, fell and hit his head. He had a stat CT head/neck/maxillofacial bones/cervical spine, which were negative for acute pathology. He had a 0.5 cm-1 cm depth with 2 cm length laceration above his L eye, and plastics sutured the lac. The patient was stepped down to 7 lach for monitoring, and he remained medically stable, thus he was stepped down to the floors. The patient had an MRI head, which showed no intracranial hemorrhage, mass, or acute/subacute infarct, though the test was limited by movement. However, it was concluded the patient's symptoms were likely not 2/2 a stroke, but to transient amnesia. Thus, the patient had a VEEG, which was negative. The patient is stable for discharge to [facility], and he should followup outpatient with Neurology for followup and workup of his progressive cognitive decline over the past year. HOwever, the patient refused [facility] and his wife (HCP) decided to take the patient home. The patient is a  63 yo M with HTN, glaucoma, gout, alcohol abuse and RA who presented with slurred speech, confusionand word-finding difficulty (NIH scale 4). He had a STAT CT head, which was negative for intracranial hemorrhage and tPA was given at 18:41PM on 4/7, with subsequent post-tPA monitoring in the MICU. A repeat CT head 24 hrs later showed no change (no hydrocephalus, midline shift, acute intracranial hemorrhage or demarcated territorial infarct). The patient was then started on ASA and HSQ. The patient was then found to exhibit delirious behaviour with agitation, and information about the patient's hx of EtOH abuse was obtained. The patient was then started on IV thiamine therapy, prn ativan and prn Seroquel. The patient was found to have normal B 12, folate, and TSH levels with negative syphilis screen. Over the weekend, the patient was agitated, fell and hit his head. He had a stat CT head/neck/maxillofacial bones/cervical spine, which were negative for acute pathology. He had a 0.5 cm-1 cm depth with 2 cm length laceration above his L eye, and plastics sutured the lac. The patient was stepped down to 7 lach for monitoring, and he remained medically stable, thus he was stepped down to the floors. The patient had an MRI head, which showed no intracranial hemorrhage, mass, or acute/subacute infarct, though the test was limited by movement. However, it was concluded the patient's symptoms were not 2/2 a stroke but possibly related to alcohol withdrawal.  His wife also reported progressive confusion and cognitive decline over the last year.  Thus, the patient had a VEEG, which was negative. The patient is stable for discharge to [facility], and he should followup outpatient with Neurology for followup and workup of his progressive cognitive decline over the past year. HOwever, the patient refused [facility] and his wife (HCP) decided to take the patient home. The patient is a  61 yo M with HTN, glaucoma, gout, alcohol abuse and RA who presented with slurred speech, confusionand word-finding difficulty (NIH scale 4). He had a STAT CT head, which was negative for intracranial hemorrhage and tPA was given at 18:41PM on 4/7, with subsequent post-tPA monitoring in the MICU. A repeat CT head 24 hrs later showed no change (no hydrocephalus, midline shift, acute intracranial hemorrhage or demarcated territorial infarct). The patient was then started on ASA and HSQ. The patient was then found to exhibit delirious behaviour with agitation, and information about the patient's hx of EtOH abuse was obtained. The patient was then started on IV thiamine therapy, prn ativan and prn Seroquel. The patient was found to have normal B 12, folate, and TSH levels with negative syphilis screen. Over the weekend, the patient was agitated, fell and hit his head. He had a stat CT head/neck/maxillofacial bones/cervical spine, which were negative for acute pathology. He had a 0.5 cm-1 cm depth with 2 cm length laceration above his L eye, and plastics sutured the lac. The patient was stepped down to 7 lach for monitoring, and he remained medically stable, thus he was stepped down to the floors. The patient had an MRI head, which showed no intracranial hemorrhage, mass, or acute/subacute infarct, though the test was limited by movement. However, it was concluded the patient's symptoms were not due to stroke.  Suspect encephalopathy likely toxic-metabolic. His wife also reported progressive confusion and cognitive decline over the last year.  Thus, the patient had a VEEG, which was negative. The patient is stable for discharge to [facility], and he should followup outpatient with Neurology for followup and workup of his progressive cognitive decline over the past year. HOwever, the patient refused [facility] and his wife (HCP) decided to take the patient home. The patient is a  63 yo M with HTN, glaucoma, gout, alcohol abuse and RA who presented with slurred speech, confusionand word-finding difficulty (NIH scale 4). He had a STAT CT head, which was negative for intracranial hemorrhage and tPA was given at 18:41PM on 4/7, with subsequent post-tPA monitoring in the MICU. A repeat CT head 24 hrs later showed no change (no hydrocephalus, midline shift, acute intracranial hemorrhage or demarcated territorial infarct). The patient was then started on ASA and HSQ. The patient was then found to exhibit delirious behaviour with agitation, and information about the patient's hx of EtOH abuse was obtained. The patient was then started on IV thiamine therapy, prn ativan and prn Seroquel. The patient was found to have normal B 12, folate, and TSH levels with negative syphilis screen. Over the weekend, the patient was agitated, fell and hit his head. He had a stat CT head/neck/maxillofacial bones/cervical spine, which were negative for acute pathology. He had a 0.5 cm-1 cm depth with 2 cm length laceration above his L eye, and plastics sutured the lac. The patient was stepped down to 7 lach for monitoring, and he remained medically stable, thus he was stepped down to the floors. The patient had an MRI head, which showed no intracranial hemorrhage, mass, or acute/subacute infarct, though the test was limited by movement. However, it was concluded the patient's symptoms were not due to stroke.  Admitting symptoms possibly toxic metabolic encephalopathy likely due to alcohol withdrawal.   His wife also reported progressive confusion and cognitive decline over the last year.  Thus, the patient had a VEEG, which was negative. The patient is stable for discharge to [facility], and he should followup outpatient with Neurology for followup and workup of his progressive cognitive decline over the past year. HOwever, the patient refused [facility] and his wife (HCP) decided to take the patient home.

## 2020-09-29 NOTE — PATIENT INSTRUCTIONS
Blood pressure is stable.  LDL cholesterol is mildly elevated.  HDL cholesterol is normal.  Fasting blood sugar and kidney functions are normal.  Vitamin D is normal.  Would discontinue diclofenac.  You can take Tylenol Extra Strength.  Maximum dose would be 3000 mg daily for arthritis.  Suggest getting vascular screening test.  We will continue current other medicines.

## 2020-09-29 NOTE — PROGRESS NOTES
Juan Manuel Joshua is a 79 y.o. female. Patient is here today for   Chief Complaint   Patient presents with   • Hypertension   • Hyperlipidemia          Vitals:    20 0851   BP: 124/72   Pulse: 66   Resp: 18   Temp: 97.3 °F (36.3 °C)   SpO2: 98%     Body mass index is 25.75 kg/m².      The following portions of the patient's history were reviewed and updated as appropriate: allergies, current medications, past family history, past medical history, past social history, past surgical history and problem list.    Past Medical History:   Diagnosis Date   • COPD (chronic obstructive pulmonary disease) (CMS/[facility])    • Depression    • GERD (gastroesophageal reflux disease)    • Hyperglycemia    • Hyperlipidemia    • Hypertension    • Macular degeneration    • Osteoarthritis       No Known Allergies   Social History     Socioeconomic History   • Marital status:      Spouse name: Not on file   • Number of children: Not on file   • Years of education: Not on file   • Highest education level: Not on file   Tobacco Use   • Smoking status: Former Smoker     Packs/day: 1.00     Years: 20.00     Pack years: 20.00     Types: Cigarettes     Quit date:      Years since quittin.7   • Smokeless tobacco: Never Used   Substance and Sexual Activity   • Alcohol use: Yes     Alcohol/week: 2.0 standard drinks     Types: 1 Glasses of wine, 1 Cans of beer per week   • Drug use: No   • Sexual activity: Defer        Current Outpatient Medications:   •  amLODIPine (NORVASC) 5 MG tablet, Take 1 tablet by mouth 2 (Two) Times a Day., Disp: 180 tablet, Rfl: 3  •  aspirin 81 MG EC tablet, Take 81 mg by mouth Daily., Disp: , Rfl:   •  atorvastatin (LIPITOR) 40 MG tablet, TAKE ONE TABLET BY MOUTH ONCE NIGHTLY, Disp: 90 tablet, Rfl: 0  •  BESIVANCE 0.6 % suspension ophthalmic suspension, SHAKE LQ AND INT 1 GTT IN OD BID, Disp: , Rfl: 1  •  budesonide-formoterol (Symbicort) 80-4.5 MCG/ACT inhaler, Inhale 2 puffs 2 (Two) Times a  Day., Disp: 3 inhaler, Rfl: 3  •  diclofenac (VOLTAREN) 75 MG EC tablet, Take 1 tablet by mouth 2 (Two) Times a Day With Meals., Disp: 180 tablet, Rfl: 0  •  fluticasone (FLONASE) 50 MCG/ACT nasal spray, 2 sprays into the nostril(s) as directed by provider 2 (Two) Times a Day., Disp: 3 bottle, Rfl: 3  •  furosemide (LASIX) 20 MG tablet, Take 1 tablet by mouth Daily., Disp: 90 tablet, Rfl: 1  •  Glucosamine HCl (GLUCOSAMINE PO), Take 50 mg by mouth Daily., Disp: , Rfl:   •  IRON PO, Take  by mouth., Disp: , Rfl:   •  Multiple Vitamins-Minerals (EYE VITAMINS PO), Take  by mouth., Disp: , Rfl:   •  pantoprazole (PROTONIX) 40 MG EC tablet, Take 1 tablet by mouth Daily., Disp: 90 tablet, Rfl: 0  •  sertraline (ZOLOFT) 100 MG tablet, TAKE ONE TABLET BY MOUTH DAILY, Disp: 90 tablet, Rfl: 0  •  vitamin D (ERGOCALCIFEROL) 65112 units capsule capsule, TAKE ONE CAPSULE BY MOUTH ONCE WEEKLY, Disp: 12 capsule, Rfl: 0     Objective   History of Present Illness Zachery is here for blood pressure check and lab follow-up.  She has hypertension, hyperlipidemia, stable depression, gastroesophageal reflux, restrictive lung disease, allergic rhinitis, vitamin D deficiency, and osteoarthritis.  She feels well.  She eats healthy and is physically active.  She monitors her blood pressure and reports stable readings.  She has lost some weight in the last year.  She had a cardiac catheterization in 2017 and had normal coronary arteries.  She has not had vascular screening tests.  She has been taking diclofenac 75 mg for arthritis and wonders if something else might be safer.  She has a growth on her posterior scalp that her comb gets stuck on and would like to have it removed.    Review of Systems   Constitutional: Negative for activity change and unexpected weight change.   Respiratory: Negative for cough and shortness of breath.    Cardiovascular:        's/<80   Gastrointestinal: Negative.    Genitourinary: Negative.    Neurological:  Negative.    Psychiatric/Behavioral: Negative for dysphoric mood.       Physical Exam  Vitals signs reviewed.   Constitutional:       Appearance: Normal appearance.   Neck:      Vascular: No carotid bruit.   Cardiovascular:      Rate and Rhythm: Normal rate and regular rhythm.      Comments: 122/62  Pulmonary:      Effort: Pulmonary effort is normal.      Breath sounds: Normal breath sounds.   Musculoskeletal:      Right lower leg: No edema.      Left lower leg: No edema.   Skin:     Comments: 1.5 cm warty growth posterior scalp   Neurological:      Mental Status: She is alert.   Psychiatric:         Mood and Affect: Mood normal.         Behavior: Behavior normal.         Thought Content: Thought content normal.         Judgment: Judgment normal.         ASSESSMENT     Problem List Items Addressed This Visit        Cardiovascular and Mediastinum    Hyperlipidemia    Hypertension - Primary       Digestive    GERD (gastroesophageal reflux disease)       Other    Depression          PLAN  There are no Patient Instructions on file for this visit.    No follow-ups on file.

## 2020-09-30 ENCOUNTER — OFFICE VISIT (OUTPATIENT)
Dept: FAMILY MEDICINE CLINIC | Facility: CLINIC | Age: 80
End: 2020-09-30

## 2020-09-30 VITALS
DIASTOLIC BLOOD PRESSURE: 70 MMHG | HEIGHT: 64 IN | SYSTOLIC BLOOD PRESSURE: 100 MMHG | HEART RATE: 58 BPM | WEIGHT: 150 LBS | OXYGEN SATURATION: 98 % | BODY MASS INDEX: 25.61 KG/M2 | TEMPERATURE: 97.3 F | RESPIRATION RATE: 17 BRPM

## 2020-09-30 DIAGNOSIS — L98.9 SKIN LESION: Primary | ICD-10-CM

## 2020-09-30 PROCEDURE — 99213 OFFICE O/P EST LOW 20 MIN: CPT | Performed by: INTERNAL MEDICINE

## 2020-09-30 PROCEDURE — 11307 SHAVE SKIN LESION 1.1-2.0 CM: CPT | Performed by: INTERNAL MEDICINE

## 2020-10-12 RX ORDER — FLUTICASONE PROPIONATE 50 MCG
2 SPRAY, SUSPENSION (ML) NASAL 2 TIMES DAILY
Qty: 3 BOTTLE | Refills: 3 | Status: SHIPPED | OUTPATIENT
Start: 2020-10-12 | End: 2021-01-27 | Stop reason: SDUPTHER

## 2020-10-15 ENCOUNTER — OFFICE VISIT (OUTPATIENT)
Dept: FAMILY MEDICINE CLINIC | Facility: CLINIC | Age: 80
End: 2020-10-15

## 2020-10-15 VITALS
HEIGHT: 64 IN | WEIGHT: 150.6 LBS | SYSTOLIC BLOOD PRESSURE: 120 MMHG | BODY MASS INDEX: 25.71 KG/M2 | TEMPERATURE: 97.1 F | HEART RATE: 55 BPM | RESPIRATION RATE: 16 BRPM | OXYGEN SATURATION: 98 % | DIASTOLIC BLOOD PRESSURE: 67 MMHG

## 2020-10-15 DIAGNOSIS — R26.89 BALANCE PROBLEMS: ICD-10-CM

## 2020-10-15 DIAGNOSIS — Z00.00 MEDICARE ANNUAL WELLNESS VISIT, SUBSEQUENT: Primary | ICD-10-CM

## 2020-10-15 DIAGNOSIS — Z13.820 SCREENING FOR OSTEOPOROSIS: ICD-10-CM

## 2020-10-15 DIAGNOSIS — M85.9 DISORDER OF BONE DENSITY AND STRUCTURE, UNSPECIFIED: ICD-10-CM

## 2020-10-15 PROCEDURE — G0439 PPPS, SUBSEQ VISIT: HCPCS | Performed by: NURSE PRACTITIONER

## 2020-10-15 NOTE — PROGRESS NOTES
The ABCs of the Annual Wellness Visit  Subsequent Medicare Wellness Visit    Chief Complaint   Patient presents with   • Medicare Wellness-subsequent       History of Present Illness:  Estela Joshua is a 79 y.o. female who presents for a Subsequent Medicare Wellness Visit.  Subjective   HEALTH RISK ASSESSMENT    Recent Hospitalizations:  No hospitalization(s) within the last year.    Current Medical Providers:  Patient Care Team:  Robert Miner MD as PCP - General  Robert Miner MD as PCP - Family Medicine    Smoking Status:  Social History     Tobacco Use   Smoking Status Former Smoker   • Packs/day: 1.00   • Years: 20.00   • Pack years: 20.00   • Types: Cigarettes   • Quit date:    • Years since quittin.8   Smokeless Tobacco Never Used       Alcohol Consumption:  Social History     Substance and Sexual Activity   Alcohol Use Yes   • Alcohol/week: 2.0 standard drinks   • Types: 1 Glasses of wine, 1 Cans of beer per week       Depression Screen:   PHQ-2/PHQ-9 Depression Screening 10/15/2020   Little interest or pleasure in doing things 0   Feeling down, depressed, or hopeless 0   Total Score 0       Fall Risk Screen:  NELLA Fall Risk Assessment was completed, and patient is at HIGH risk for falls. Assessment completed on:10/15/2020    Health Habits and Functional and Cognitive Screening:  Functional & Cognitive Status 10/15/2020   Do you have difficulty preparing food and eating? No   Do you have difficulty bathing yourself, getting dressed or grooming yourself? -   Do you have difficulty using the toilet? No   Do you have difficulty moving around from place to place? No   Do you have trouble with steps or getting out of a bed or a chair? Yes   Current Diet Well Balanced Diet   Dental Exam Not up to date   Eye Exam Not up to date   Exercise (times per week) 3 times per week   Current Exercise Activities Include Walking   Do you need help using the phone?  No   Are you deaf or do you have serious  difficulty hearing?  Yes   Do you need help with transportation? No   Do you need help shopping? No   Do you need help preparing meals?  No   Do you need help with housework?  No   Do you need help with laundry? No   Do you need help taking your medications? No   Do you need help managing money? No   Do you ever drive or ride in a car without wearing a seat belt? No   Have you felt unusual stress, anger or loneliness in the last month? -   Who do you live with? -   If you need help, do you have trouble finding someone available to you? -   Have you been bothered in the last four weeks by sexual problems? -   Do you have difficulty concentrating, remembering or making decisions? -   last dental exam: a couple of years ago  Last eye exam: 2019    Does the patient have evidence of cognitive impairment? No    Asprin use counseling:Taking ASA appropriately as indicated    Age-appropriate Screening Schedule:  Refer to the list below for future screening recommendations based on patient's age, sex and/or medical conditions. Orders for these recommended tests are listed in the plan section. The patient has been provided with a written plan.    Health Maintenance   Topic Date Due   • ZOSTER VACCINE (2 of 3) 02/26/2014   • MAMMOGRAM  03/18/2021   • LIPID PANEL  09/22/2021   • COLONOSCOPY  09/01/2027   • TDAP/TD VACCINES (2 - Td) 09/01/2028   • INFLUENZA VACCINE  Completed          The following portions of the patient's history were reviewed and updated as appropriate: allergies, current medications, past family history, past medical history, past social history, past surgical history and problem list.    Outpatient Medications Prior to Visit   Medication Sig Dispense Refill   • amLODIPine (NORVASC) 5 MG tablet Take 1 tablet by mouth 2 (Two) Times a Day. 180 tablet 3   • aspirin 81 MG EC tablet Take 81 mg by mouth Daily.     • atorvastatin (LIPITOR) 40 MG tablet TAKE ONE TABLET BY MOUTH ONCE NIGHTLY 90 tablet 0   • BESIVANCE  0.6 % suspension ophthalmic suspension SHAKE LQ AND INT 1 GTT IN OD BID  1   • budesonide-formoterol (Symbicort) 80-4.5 MCG/ACT inhaler Inhale 2 puffs 2 (Two) Times a Day. 3 inhaler 3   • fluticasone (FLONASE) 50 MCG/ACT nasal spray 2 sprays into the nostril(s) as directed by provider 2 (Two) Times a Day. 3 bottle 3   • furosemide (LASIX) 20 MG tablet Take 1 tablet by mouth Daily. 90 tablet 1   • Glucosamine HCl (GLUCOSAMINE PO) Take 50 mg by mouth Daily.     • Multiple Vitamins-Minerals (EYE VITAMINS PO) Take  by mouth.     • pantoprazole (PROTONIX) 40 MG EC tablet Take 1 tablet by mouth Daily. 90 tablet 0   • sertraline (ZOLOFT) 100 MG tablet TAKE ONE TABLET BY MOUTH DAILY 90 tablet 0   • diclofenac (VOLTAREN) 75 MG EC tablet Take 1 tablet by mouth 2 (Two) Times a Day With Meals. 180 tablet 0   • IRON PO Take  by mouth.     • vitamin D (ERGOCALCIFEROL) 05167 units capsule capsule TAKE ONE CAPSULE BY MOUTH ONCE WEEKLY 12 capsule 0     No facility-administered medications prior to visit.        Patient Active Problem List   Diagnosis   • Depression   • Hyperlipidemia   • Hypertension   • Visual disturbance   • Vitamin D deficiency   • Imbalance   • Impacted cerumen of right ear   • Shortness of breath on exertion   • Edema   • Restrictive lung disease   • Arthralgia   • GERD (gastroesophageal reflux disease)   • Arthritis of left knee   • Arthritis of knee, right   • Chronic bilateral low back pain without sciatica   • Episodic lightheadedness   • Allergic rhinitis       Advanced Care Planning:  ACP discussion was held with the patient during this visit. Patient has an advance directive (not in EMR), copy requested.    Review of Systems   Constitutional: Negative.    Respiratory: Negative.    Cardiovascular: Negative.        Compared to one year ago, the patient feels her physical health is better.  Compared to one year ago, the patient feels her mental health is the same.    Reviewed chart for potential of high  "risk medication in the elderly: yes  Reviewed chart for potential of harmful drug interactions in the elderly:yes    Objective         Vitals:    10/15/20 1019   BP: 120/67   BP Location: Left arm   Patient Position: Sitting   Cuff Size: Adult   Pulse: 55   Resp: 16   Temp: 97.1 °F (36.2 °C)   SpO2: 98%   Weight: 68.3 kg (150 lb 9.6 oz)   Height: 162.6 cm (64\")       Body mass index is 25.85 kg/m².  Discussed the patient's BMI with her. The BMI is in the acceptable range.     Physical Exam  Vitals signs reviewed.   Eyes:      Pupils: Pupils are equal, round, and reactive to light.   Cardiovascular:      Rate and Rhythm: Normal rate and regular rhythm.   Pulmonary:      Effort: Pulmonary effort is normal.      Breath sounds: Normal breath sounds.   Neurological:      Mental Status: She is alert and oriented to person, place, and time.   Psychiatric:         Behavior: Behavior normal.         Lab Results   Component Value Date    GLU 92 09/22/2020    CHLPL 180 09/22/2020    TRIG 72 09/22/2020    HDL 58 09/22/2020     (H) 09/22/2020    VLDL 14.4 09/22/2020        Assessment/Plan   Medicare Risks and Personalized Health Plan  CMS Preventative Services Quick Reference  Advance Directive Discussion  Breast Cancer/Mammogram Screening  Cardiovascular risk  Colon Cancer Screening  Fall Risk  Immunizations Discussed/Encouraged (specific immunizations; Shingrix )  Osteoprorosis Risk    The above risks/problems have been discussed with the patient.  Pertinent information has been shared with the patient in the After Visit Summary.  Follow up plans and orders are seen below in the Assessment/Plan Section.    Diagnoses and all orders for this visit:    1. Medicare annual wellness visit, subsequent (Primary)    2. Balance problems  -     Ambulatory Referral to Physical Therapy Evaluate and treat    3. Screening for osteoporosis  -     DEXA Bone Density Axial    4. Disorder of bone density and structure, unspecified   -     " DEXA Bone Density Axial      Follow Up:  Return for Dr. Miner as needed/as recommended.   Needs HepC screening on next lab work  An After Visit Summary and PPPS were given to the patient.

## 2020-10-22 ENCOUNTER — TRANSCRIBE ORDERS (OUTPATIENT)
Dept: ADMINISTRATIVE | Facility: HOSPITAL | Age: 80
End: 2020-10-22

## 2020-10-22 DIAGNOSIS — Z13.6 ENCOUNTER FOR SCREENING FOR VASCULAR DISEASE: Primary | ICD-10-CM

## 2020-10-22 RX ORDER — PANTOPRAZOLE SODIUM 40 MG/1
40 TABLET, DELAYED RELEASE ORAL DAILY
Qty: 90 TABLET | Refills: 0 | Status: SHIPPED | OUTPATIENT
Start: 2020-10-22 | End: 2021-01-25 | Stop reason: SDUPTHER

## 2020-10-22 RX ORDER — SERTRALINE HYDROCHLORIDE 100 MG/1
100 TABLET, FILM COATED ORAL DAILY
Qty: 90 TABLET | Refills: 0 | Status: SHIPPED | OUTPATIENT
Start: 2020-10-22 | End: 2021-02-01 | Stop reason: SDUPTHER

## 2020-10-22 RX ORDER — ATORVASTATIN CALCIUM 40 MG/1
40 TABLET, FILM COATED ORAL NIGHTLY
Qty: 90 TABLET | Refills: 0 | Status: SHIPPED | OUTPATIENT
Start: 2020-10-22 | End: 2021-01-25 | Stop reason: SDUPTHER

## 2020-10-29 ENCOUNTER — HOSPITAL ENCOUNTER (OUTPATIENT)
Dept: BONE DENSITY | Facility: HOSPITAL | Age: 80
Discharge: HOME | End: 2020-10-29
Admitting: NURSE PRACTITIONER

## 2020-10-29 PROCEDURE — 77080 DXA BONE DENSITY AXIAL: CPT

## 2020-11-03 ENCOUNTER — HOSPITAL ENCOUNTER (OUTPATIENT)
Dept: CARDIOLOGY | Facility: HOSPITAL | Age: 80
Discharge: HOME | End: 2020-11-03
Admitting: SURGERY

## 2020-11-03 VITALS
SYSTOLIC BLOOD PRESSURE: 140 MMHG | WEIGHT: 149 LBS | DIASTOLIC BLOOD PRESSURE: 57 MMHG | HEIGHT: 63 IN | BODY MASS INDEX: 26.4 KG/M2 | HEART RATE: 54 BPM

## 2020-11-03 DIAGNOSIS — Z13.6 ENCOUNTER FOR SCREENING FOR VASCULAR DISEASE: ICD-10-CM

## 2020-11-03 LAB
BH CV ECHO MEAS - DIST AO DIAM: 1.35 CM
BH CV VAS BP LEFT ARM: NORMAL MMHG
BH CV VAS BP RIGHT ARM: NORMAL MMHG
BH CV XLRA MEAS - MID AO DIAM: 1.64 CM
BH CV XLRA MEAS - PAD LEFT ABI DP: 1.07
BH CV XLRA MEAS - PAD LEFT ABI PT: 1.11
BH CV XLRA MEAS - PAD LEFT ARM: 140 MMHG
BH CV XLRA MEAS - PAD LEFT LEG DP: 150 MMHG
BH CV XLRA MEAS - PAD LEFT LEG PT: 156 MMHG
BH CV XLRA MEAS - PAD RIGHT ABI DP: 1.14
BH CV XLRA MEAS - PAD RIGHT ABI PT: 1.14
BH CV XLRA MEAS - PAD RIGHT ARM: 135 MMHG
BH CV XLRA MEAS - PAD RIGHT LEG DP: 160 MMHG
BH CV XLRA MEAS - PAD RIGHT LEG PT: 160 MMHG
BH CV XLRA MEAS - PROX AO DIAM: 1.92 CM
BH CV XLRA MEAS LEFT ICA/CCA RATIO: 1.1
BH CV XLRA MEAS LEFT MID CCA PSV: NORMAL CM/SEC
BH CV XLRA MEAS LEFT MID ICA PSV: NORMAL CM/SEC
BH CV XLRA MEAS LEFT PROX ECA PSV: NORMAL CM/SEC
BH CV XLRA MEAS RIGHT ICA/CCA RATIO: 1.2
BH CV XLRA MEAS RIGHT MID CCA PSV: NORMAL CM/SEC
BH CV XLRA MEAS RIGHT MID ICA PSV: NORMAL CM/SEC
BH CV XLRA MEAS RIGHT PROX ECA PSV: NORMAL CM/SEC

## 2020-11-03 PROCEDURE — 93799 UNLISTED CV SVC/PROCEDURE: CPT

## 2020-11-12 PROBLEM — L98.9 SKIN LESION: Status: ACTIVE | Noted: 2020-11-12

## 2021-01-25 RX ORDER — PANTOPRAZOLE SODIUM 40 MG/1
40 TABLET, DELAYED RELEASE ORAL DAILY
Qty: 90 TABLET | Refills: 3 | Status: SHIPPED | OUTPATIENT
Start: 2021-01-25 | End: 2021-04-21 | Stop reason: SDUPTHER

## 2021-01-25 RX ORDER — ATORVASTATIN CALCIUM 40 MG/1
40 TABLET, FILM COATED ORAL NIGHTLY
Qty: 90 TABLET | Refills: 3 | Status: SHIPPED | OUTPATIENT
Start: 2021-01-25 | End: 2021-04-21 | Stop reason: SDUPTHER

## 2021-01-29 RX ORDER — FLUTICASONE PROPIONATE 50 MCG
1 SPRAY, SUSPENSION (ML) NASAL 2 TIMES DAILY
Qty: 16 G | Refills: 5 | Status: SHIPPED | OUTPATIENT
Start: 2021-01-29 | End: 2021-03-16 | Stop reason: SDUPTHER

## 2021-02-02 RX ORDER — DICLOFENAC SODIUM 75 MG/1
75 TABLET, DELAYED RELEASE ORAL 2 TIMES DAILY WITH MEALS
Qty: 180 TABLET | Refills: 3 | Status: SHIPPED | OUTPATIENT
Start: 2021-02-02 | End: 2021-04-21 | Stop reason: SDUPTHER

## 2021-02-02 RX ORDER — SERTRALINE HYDROCHLORIDE 100 MG/1
100 TABLET, FILM COATED ORAL DAILY
Qty: 90 TABLET | Refills: 3 | Status: SHIPPED | OUTPATIENT
Start: 2021-02-02 | End: 2021-04-21 | Stop reason: SDUPTHER

## 2021-03-02 DIAGNOSIS — Z23 IMMUNIZATION DUE: ICD-10-CM

## 2021-03-03 ENCOUNTER — IMMUNIZATION (OUTPATIENT)
Dept: VACCINE CLINIC | Facility: HOSPITAL | Age: 81
End: 2021-03-03

## 2021-03-03 DIAGNOSIS — Z23 IMMUNIZATION DUE: ICD-10-CM

## 2021-03-03 PROCEDURE — 91300 HC SARSCOV02 VAC 30MCG/0.3ML IM: CPT | Performed by: INTERNAL MEDICINE

## 2021-03-03 PROCEDURE — 0001A: CPT | Performed by: INTERNAL MEDICINE

## 2021-03-16 RX ORDER — FLUTICASONE PROPIONATE 50 MCG
1 SPRAY, SUSPENSION (ML) NASAL 2 TIMES DAILY
Qty: 16 G | Refills: 5 | Status: SHIPPED | OUTPATIENT
Start: 2021-03-16 | End: 2021-04-21 | Stop reason: SDUPTHER

## 2021-03-24 ENCOUNTER — IMMUNIZATION (OUTPATIENT)
Dept: VACCINE CLINIC | Facility: HOSPITAL | Age: 81
End: 2021-03-24

## 2021-03-24 PROCEDURE — 0002A: CPT | Performed by: INTERNAL MEDICINE

## 2021-03-24 PROCEDURE — 91300 HC SARSCOV02 VAC 30MCG/0.3ML IM: CPT | Performed by: INTERNAL MEDICINE

## 2021-03-30 ENCOUNTER — LAB (OUTPATIENT)
Dept: FAMILY MEDICINE CLINIC | Facility: CLINIC | Age: 81
End: 2021-03-30

## 2021-03-30 DIAGNOSIS — E78.5 HYPERLIPIDEMIA, UNSPECIFIED HYPERLIPIDEMIA TYPE: Primary | ICD-10-CM

## 2021-03-31 LAB
ALBUMIN SERPL-MCNC: 4.3 G/DL (ref 3.5–5.2)
ALBUMIN/GLOB SERPL: 2 G/DL
ALP SERPL-CCNC: 55 U/L (ref 39–117)
ALT SERPL-CCNC: 16 U/L (ref 1–33)
APPEARANCE UR: CLEAR
AST SERPL-CCNC: 15 U/L (ref 1–32)
BACTERIA #/AREA URNS HPF: ABNORMAL /HPF
BASOPHILS # BLD AUTO: 0.04 10*3/MM3 (ref 0–0.2)
BASOPHILS NFR BLD AUTO: 0.5 % (ref 0–1.5)
BILIRUB SERPL-MCNC: 0.3 MG/DL (ref 0–1.2)
BILIRUB UR QL STRIP: NEGATIVE
BUN SERPL-MCNC: 27 MG/DL (ref 8–23)
BUN/CREAT SERPL: 40.3 (ref 7–25)
CALCIUM SERPL-MCNC: 9.1 MG/DL (ref 8.6–10.5)
CASTS URNS MICRO: ABNORMAL
CHLORIDE SERPL-SCNC: 107 MMOL/L (ref 98–107)
CHOLEST SERPL-MCNC: 162 MG/DL (ref 0–200)
CO2 SERPL-SCNC: 28.8 MMOL/L (ref 22–29)
COLOR UR: YELLOW
CREAT SERPL-MCNC: 0.67 MG/DL (ref 0.57–1)
EOSINOPHIL # BLD AUTO: 0.14 10*3/MM3 (ref 0–0.4)
EOSINOPHIL NFR BLD AUTO: 1.9 % (ref 0.3–6.2)
EPI CELLS #/AREA URNS HPF: ABNORMAL /HPF
ERYTHROCYTE [DISTWIDTH] IN BLOOD BY AUTOMATED COUNT: 12.5 % (ref 12.3–15.4)
GLOBULIN SER CALC-MCNC: 2.1 GM/DL
GLUCOSE SERPL-MCNC: 90 MG/DL (ref 65–99)
GLUCOSE UR QL: NEGATIVE
HCT VFR BLD AUTO: 39.7 % (ref 34–46.6)
HCV AB S/CO SERPL IA: <0.1 S/CO RATIO (ref 0–0.9)
HDLC SERPL-MCNC: 55 MG/DL (ref 40–60)
HGB BLD-MCNC: 13 G/DL (ref 12–15.9)
HGB UR QL STRIP: NEGATIVE
IMM GRANULOCYTES # BLD AUTO: 0.02 10*3/MM3 (ref 0–0.05)
IMM GRANULOCYTES NFR BLD AUTO: 0.3 % (ref 0–0.5)
KETONES UR QL STRIP: NEGATIVE
LDLC SERPL CALC-MCNC: 92 MG/DL (ref 0–100)
LDLC/HDLC SERPL: 1.66 {RATIO}
LEUKOCYTE ESTERASE UR QL STRIP: ABNORMAL
LYMPHOCYTES # BLD AUTO: 1.36 10*3/MM3 (ref 0.7–3.1)
LYMPHOCYTES NFR BLD AUTO: 18.6 % (ref 19.6–45.3)
MCH RBC QN AUTO: 29.8 PG (ref 26.6–33)
MCHC RBC AUTO-ENTMCNC: 32.7 G/DL (ref 31.5–35.7)
MCV RBC AUTO: 91.1 FL (ref 79–97)
MONOCYTES # BLD AUTO: 0.54 10*3/MM3 (ref 0.1–0.9)
MONOCYTES NFR BLD AUTO: 7.4 % (ref 5–12)
NEUTROPHILS # BLD AUTO: 5.21 10*3/MM3 (ref 1.7–7)
NEUTROPHILS NFR BLD AUTO: 71.3 % (ref 42.7–76)
NITRITE UR QL STRIP: NEGATIVE
NRBC BLD AUTO-RTO: 0 /100 WBC (ref 0–0.2)
PH UR STRIP: 6.5 [PH] (ref 5–8)
PLATELET # BLD AUTO: 234 10*3/MM3 (ref 140–450)
POTASSIUM SERPL-SCNC: 4.1 MMOL/L (ref 3.5–5.2)
PROT SERPL-MCNC: 6.4 G/DL (ref 6–8.5)
PROT UR QL STRIP: NEGATIVE
RBC # BLD AUTO: 4.36 10*6/MM3 (ref 3.77–5.28)
RBC #/AREA URNS HPF: ABNORMAL /HPF
SODIUM SERPL-SCNC: 145 MMOL/L (ref 136–145)
SP GR UR: 1.02 (ref 1–1.03)
TRIGL SERPL-MCNC: 78 MG/DL (ref 0–150)
TSH SERPL DL<=0.005 MIU/L-ACNC: 3.16 UIU/ML (ref 0.27–4.2)
UROBILINOGEN UR STRIP-MCNC: ABNORMAL MG/DL
VLDLC SERPL CALC-MCNC: 15 MG/DL (ref 5–40)
WBC # BLD AUTO: 7.31 10*3/MM3 (ref 3.4–10.8)
WBC #/AREA URNS HPF: ABNORMAL /HPF

## 2021-04-06 ENCOUNTER — OFFICE VISIT (OUTPATIENT)
Dept: FAMILY MEDICINE CLINIC | Facility: CLINIC | Age: 81
End: 2021-04-06

## 2021-04-06 VITALS
WEIGHT: 151.6 LBS | TEMPERATURE: 96.6 F | HEART RATE: 68 BPM | OXYGEN SATURATION: 99 % | BODY MASS INDEX: 27.9 KG/M2 | HEIGHT: 62 IN | SYSTOLIC BLOOD PRESSURE: 120 MMHG | DIASTOLIC BLOOD PRESSURE: 64 MMHG | RESPIRATION RATE: 18 BRPM

## 2021-04-06 DIAGNOSIS — Z12.31 ENCOUNTER FOR SCREENING MAMMOGRAM FOR MALIGNANT NEOPLASM OF BREAST: ICD-10-CM

## 2021-04-06 DIAGNOSIS — K21.9 GASTROESOPHAGEAL REFLUX DISEASE, UNSPECIFIED WHETHER ESOPHAGITIS PRESENT: ICD-10-CM

## 2021-04-06 DIAGNOSIS — E78.5 HYPERLIPIDEMIA, UNSPECIFIED HYPERLIPIDEMIA TYPE: ICD-10-CM

## 2021-04-06 DIAGNOSIS — F32.89 OTHER DEPRESSION: ICD-10-CM

## 2021-04-06 DIAGNOSIS — I10 ESSENTIAL HYPERTENSION: Primary | ICD-10-CM

## 2021-04-06 DIAGNOSIS — M85.80 OSTEOPENIA, UNSPECIFIED LOCATION: ICD-10-CM

## 2021-04-06 PROCEDURE — 99214 OFFICE O/P EST MOD 30 MIN: CPT | Performed by: INTERNAL MEDICINE

## 2021-04-06 RX ORDER — ALENDRONATE SODIUM 70 MG/1
70 TABLET ORAL
Qty: 4 TABLET | Refills: 11 | Status: SHIPPED | OUTPATIENT
Start: 2021-04-06 | End: 2021-05-03 | Stop reason: SDUPTHER

## 2021-04-06 NOTE — PATIENT INSTRUCTIONS
Blood pressure is normal.  Lipids are normal.  A complete blood count, thyroid-stimulating hormone level, kidney and liver functions are normal.  Urinalysis showed 3-5 red blood cells but was not a clean-catch midstream urinalysis.  We will plan on rechecking a urinalysis collected correctly and microscopic if indicated.  Discussed osteopenia.  We will try Fosamax 70 mg weekly.  Discussed potential side effects such as worsening gastroesophageal reflux symptoms.  Continue pantoprazole 40 mg daily.  Discussed doing weightbearing exercises.  Will continue current other medicines.  Will order a mammogram.

## 2021-04-06 NOTE — PROGRESS NOTES
Juan Manuel Joshua is a 80 y.o. female. Patient is here today for   Chief Complaint   Patient presents with   • Hypertension     follow up    • Med Refill          Vitals:    21 0828   BP: 120/64   Pulse: 68   Resp: 18   Temp: 96.6 °F (35.9 °C)   SpO2: 99%     Body mass index is 27.73 kg/m².      The following portions of the patient's history were reviewed and updated as appropriate: allergies, current medications, past family history, past medical history, past social history, past surgical history and problem list.    Past Medical History:   Diagnosis Date   • COPD (chronic obstructive pulmonary disease) (CMS/[facility])    • Depression    • GERD (gastroesophageal reflux disease)    • Hyperglycemia    • Hyperlipidemia    • Hypertension    • Macular degeneration    • Osteoarthritis       No Known Allergies   Social History     Socioeconomic History   • Marital status:      Spouse name: Not on file   • Number of children: Not on file   • Years of education: Not on file   • Highest education level: Not on file   Tobacco Use   • Smoking status: Former Smoker     Packs/day: 1.00     Years: 20.00     Pack years: 20.00     Types: Cigarettes     Quit date:      Years since quittin.2   • Smokeless tobacco: Never Used   Substance and Sexual Activity   • Alcohol use: Yes     Alcohol/week: 2.0 standard drinks     Types: 1 Glasses of wine, 1 Cans of beer per week   • Drug use: No   • Sexual activity: Defer        Current Outpatient Medications:   •  amLODIPine (NORVASC) 5 MG tablet, Take 1 tablet by mouth 2 (Two) Times a Day., Disp: 180 tablet, Rfl: 3  •  aspirin 81 MG EC tablet, Take 81 mg by mouth Daily., Disp: , Rfl:   •  atorvastatin (LIPITOR) 40 MG tablet, Take 1 tablet by mouth Every Night., Disp: 90 tablet, Rfl: 3  •  BESIVANCE 0.6 % suspension ophthalmic suspension, SHAKE LQ AND INT 1 GTT IN OD BID, Disp: , Rfl: 1  •  budesonide-formoterol (Symbicort) 80-4.5 MCG/ACT inhaler, Inhale 2 puffs 2  (Two) Times a Day., Disp: 3 inhaler, Rfl: 3  •  cefdinir (OMNICEF) 300 MG capsule, Take 1 capsule by mouth 2 (Two) Times a Day., Disp: 20 capsule, Rfl: 0  •  diclofenac (VOLTAREN) 75 MG EC tablet, Take 1 tablet by mouth 2 (Two) Times a Day With Meals., Disp: 180 tablet, Rfl: 3  •  fluticasone (FLONASE) 50 MCG/ACT nasal spray, 1 spray into the nostril(s) as directed by provider 2 (Two) Times a Day., Disp: 16 g, Rfl: 5  •  furosemide (LASIX) 20 MG tablet, Take 1 tablet by mouth Daily., Disp: 90 tablet, Rfl: 1  •  Glucosamine HCl (GLUCOSAMINE PO), Take 50 mg by mouth Daily., Disp: , Rfl:   •  IRON PO, Take  by mouth., Disp: , Rfl:   •  Multiple Vitamins-Minerals (EYE VITAMINS PO), Take  by mouth., Disp: , Rfl:   •  pantoprazole (PROTONIX) 40 MG EC tablet, Take 1 tablet by mouth Daily., Disp: 90 tablet, Rfl: 3  •  sertraline (ZOLOFT) 100 MG tablet, Take 1 tablet by mouth Daily., Disp: 90 tablet, Rfl: 3  •  vitamin D (ERGOCALCIFEROL) 59070 units capsule capsule, TAKE ONE CAPSULE BY MOUTH ONCE WEEKLY, Disp: 12 capsule, Rfl: 0  •  alendronate (Fosamax) 70 MG tablet, Take 1 tablet by mouth Every 7 (Seven) Days., Disp: 4 tablet, Rfl: 11     Objective   History of Present Illness Estela is here for blood pressure check and lab follow-up.  She has hypertension, hyperlipidemia, gastroesophageal reflux, stable depression, allergic rhinitis, osteopenia, COPD, and left carotid artery disease.  She feels well.  She has been eating healthy and is physically active but does not formally exercise.  Her weight is stable.  She monitors her blood pressure reports stable readings.  She does not check it a second time.  She had vascular screening test in November.  She also had a bone density test last October which showed about a 5% decrease in hip bone density.  She does take weekly vitamin D.    Review of Systems   Constitutional: Negative for activity change and unexpected weight change.   Respiratory: Negative for cough and shortness  of breath.    Cardiovascular:        BP around 130   Gastrointestinal: Negative.    Genitourinary: Negative.    Neurological: Negative.    Psychiatric/Behavioral: Negative for dysphoric mood.       Physical Exam  Vitals reviewed.   Constitutional:       Appearance: Normal appearance.   Neck:      Vascular: No carotid bruit.   Cardiovascular:      Rate and Rhythm: Normal rate and regular rhythm.      Heart sounds: Normal heart sounds.      Comments: 110/50  Pulmonary:      Effort: Pulmonary effort is normal.      Breath sounds: Normal breath sounds.   Musculoskeletal:      Right lower leg: No edema.      Left lower leg: No edema.   Neurological:      Mental Status: She is alert.   Psychiatric:         Mood and Affect: Mood normal.         Behavior: Behavior normal.         Thought Content: Thought content normal.         Judgment: Judgment normal.         ASSESSMENT     Problems Addressed this Visit        Cardiac and Vasculature    Hyperlipidemia    Hypertension - Primary       Gastrointestinal Abdominal     GERD (gastroesophageal reflux disease)       Mental Health    Depression       Musculoskeletal and Injuries    Osteopenia      Other Visit Diagnoses     Encounter for screening mammogram for malignant neoplasm of breast        Relevant Orders    Mammo Screening Bilateral With CAD      Diagnoses       Codes Comments    Essential hypertension    -  Primary ICD-10-CM: I10  ICD-9-CM: 401.9     Hyperlipidemia, unspecified hyperlipidemia type     ICD-10-CM: E78.5  ICD-9-CM: 272.4     Other depression     ICD-10-CM: F32.89  ICD-9-CM: 311     Osteopenia, unspecified location     ICD-10-CM: M85.80  ICD-9-CM: 733.90     Encounter for screening mammogram for malignant neoplasm of breast     ICD-10-CM: Z12.31  ICD-9-CM: V76.12     Gastroesophageal reflux disease, unspecified whether esophagitis present     ICD-10-CM: K21.9  ICD-9-CM: 530.81           PLAN  Patient Instructions   Blood pressure is normal.  Lipids are normal.   A complete blood count, thyroid-stimulating hormone level, kidney and liver functions are normal.  Urinalysis showed 3-5 red blood cells but was not a clean-catch midstream urinalysis.  We will plan on rechecking a urinalysis collected correctly and microscopic if indicated.  Discussed osteopenia.  We will try Fosamax 70 mg weekly.  Discussed potential side effects such as worsening gastroesophageal reflux symptoms.  Continue pantoprazole 40 mg daily.  Discussed doing weightbearing exercises.  Will continue current other medicines.  Will order a mammogram.      Return in about 6 months (around 10/6/2021), or [facility], for labs BMP,LIPID,VIT D.

## 2021-04-07 RX ORDER — AMLODIPINE BESYLATE 5 MG/1
5 TABLET ORAL 2 TIMES DAILY
Qty: 180 TABLET | Refills: 3 | Status: SHIPPED | OUTPATIENT
Start: 2021-04-07 | End: 2021-11-02 | Stop reason: SDUPTHER

## 2021-04-08 ENCOUNTER — TRANSCRIBE ORDERS (OUTPATIENT)
Dept: ADMINISTRATIVE | Facility: HOSPITAL | Age: 81
End: 2021-04-08

## 2021-04-08 DIAGNOSIS — Z12.31 ENCOUNTER FOR SCREENING MAMMOGRAM FOR MALIGNANT NEOPLASM OF BREAST: Primary | ICD-10-CM

## 2021-04-23 RX ORDER — ATORVASTATIN CALCIUM 40 MG/1
40 TABLET, FILM COATED ORAL NIGHTLY
Qty: 90 TABLET | Refills: 3 | Status: SHIPPED | OUTPATIENT
Start: 2021-04-23 | End: 2021-11-02 | Stop reason: SDUPTHER

## 2021-04-23 RX ORDER — FLUTICASONE PROPIONATE 50 MCG
1 SPRAY, SUSPENSION (ML) NASAL 2 TIMES DAILY
Qty: 16 G | Refills: 5 | Status: SHIPPED | OUTPATIENT
Start: 2021-04-23 | End: 2021-07-02 | Stop reason: SDUPTHER

## 2021-04-23 RX ORDER — SERTRALINE HYDROCHLORIDE 100 MG/1
100 TABLET, FILM COATED ORAL DAILY
Qty: 90 TABLET | Refills: 3 | Status: SHIPPED | OUTPATIENT
Start: 2021-04-23 | End: 2021-11-02 | Stop reason: SDUPTHER

## 2021-04-23 RX ORDER — FUROSEMIDE 20 MG/1
20 TABLET ORAL DAILY
Qty: 90 TABLET | Refills: 1 | Status: SHIPPED | OUTPATIENT
Start: 2021-04-23 | End: 2021-11-02 | Stop reason: SDUPTHER

## 2021-04-23 RX ORDER — DICLOFENAC SODIUM 75 MG/1
75 TABLET, DELAYED RELEASE ORAL 2 TIMES DAILY WITH MEALS
Qty: 180 TABLET | Refills: 3 | Status: SHIPPED | OUTPATIENT
Start: 2021-04-23 | End: 2021-11-02 | Stop reason: SDUPTHER

## 2021-04-23 RX ORDER — PANTOPRAZOLE SODIUM 40 MG/1
40 TABLET, DELAYED RELEASE ORAL DAILY
Qty: 90 TABLET | Refills: 3 | Status: SHIPPED | OUTPATIENT
Start: 2021-04-23 | End: 2021-11-02 | Stop reason: SDUPTHER

## 2021-05-03 RX ORDER — ALENDRONATE SODIUM 70 MG/1
70 TABLET ORAL
Qty: 12 TABLET | Refills: 3 | Status: SHIPPED | OUTPATIENT
Start: 2021-05-03 | End: 2021-11-02 | Stop reason: SDUPTHER

## 2021-05-11 ENCOUNTER — TELEPHONE (OUTPATIENT)
Dept: FAMILY MEDICINE CLINIC | Facility: CLINIC | Age: 81
End: 2021-05-11

## 2021-05-11 NOTE — TELEPHONE ENCOUNTER
PT WOULD LIKE A LETTER STATING SHE IS UNABLE TO DO JURY DUTY. 80 YEARS OLD AND DOES NOT DRIVE OUTSIDE OF [place]. NEEDS THIS AS SOON AS POSSIBLE.    PT #[phone]

## 2021-05-12 DIAGNOSIS — I10 ESSENTIAL HYPERTENSION: Primary | ICD-10-CM

## 2021-05-13 LAB
APPEARANCE UR: CLEAR
BACTERIA #/AREA URNS HPF: ABNORMAL /HPF
BILIRUB UR QL STRIP: NEGATIVE
CASTS URNS MICRO: ABNORMAL
COLOR UR: YELLOW
EPI CELLS #/AREA URNS HPF: ABNORMAL /HPF
GLUCOSE UR QL: NEGATIVE
HGB UR QL STRIP: NEGATIVE
KETONES UR QL STRIP: NEGATIVE
LEUKOCYTE ESTERASE UR QL STRIP: ABNORMAL
MUCOUS THREADS URNS QL MICRO: ABNORMAL /HPF
NITRITE UR QL STRIP: NEGATIVE
PH UR STRIP: 5.5 [PH] (ref 5–8)
PROT UR QL STRIP: NEGATIVE
RBC #/AREA URNS HPF: ABNORMAL /HPF
SP GR UR: 1.02 (ref 1–1.03)
UROBILINOGEN UR STRIP-MCNC: ABNORMAL MG/DL
WBC #/AREA URNS HPF: ABNORMAL /HPF

## 2021-05-20 ENCOUNTER — HOSPITAL ENCOUNTER (OUTPATIENT)
Dept: MAMMOGRAPHY | Facility: HOSPITAL | Age: 81
Discharge: HOME | End: 2021-05-20
Admitting: INTERNAL MEDICINE

## 2021-05-20 DIAGNOSIS — Z12.31 ENCOUNTER FOR SCREENING MAMMOGRAM FOR MALIGNANT NEOPLASM OF BREAST: ICD-10-CM

## 2021-05-20 PROCEDURE — 77067 SCR MAMMO BI INCL CAD: CPT

## 2021-05-20 PROCEDURE — 77063 BREAST TOMOSYNTHESIS BI: CPT

## 2021-07-02 RX ORDER — FLUTICASONE PROPIONATE 50 MCG
SPRAY, SUSPENSION (ML) NASAL
Qty: 16 G | Refills: 5 | Status: SHIPPED | OUTPATIENT
Start: 2021-07-02 | End: 2021-11-02 | Stop reason: SDUPTHER

## 2021-07-02 RX ORDER — FLUTICASONE PROPIONATE 50 MCG
1 SPRAY, SUSPENSION (ML) NASAL 2 TIMES DAILY
Qty: 16 G | Refills: 5 | OUTPATIENT
Start: 2021-07-02

## 2021-10-14 DIAGNOSIS — E78.5 HYPERLIPIDEMIA, UNSPECIFIED HYPERLIPIDEMIA TYPE: Primary | ICD-10-CM

## 2021-10-14 DIAGNOSIS — I10 PRIMARY HYPERTENSION: ICD-10-CM

## 2021-10-14 DIAGNOSIS — Z13.29 SCREENING FOR THYROID DISORDER: ICD-10-CM

## 2021-10-20 LAB
ALBUMIN SERPL-MCNC: 4.1 G/DL (ref 3.7–4.7)
ALBUMIN/GLOB SERPL: 1.8 {RATIO} (ref 1.2–2.2)
ALP SERPL-CCNC: 28 IU/L (ref 44–121)
ALT SERPL-CCNC: 16 IU/L (ref 0–32)
AST SERPL-CCNC: 17 IU/L (ref 0–40)
BASOPHILS # BLD AUTO: 0 X10E3/UL (ref 0–0.2)
BASOPHILS NFR BLD AUTO: 1 %
BILIRUB SERPL-MCNC: 0.3 MG/DL (ref 0–1.2)
BUN SERPL-MCNC: 17 MG/DL (ref 8–27)
BUN/CREAT SERPL: 23 (ref 12–28)
CALCIUM SERPL-MCNC: 8.7 MG/DL (ref 8.7–10.3)
CHLORIDE SERPL-SCNC: 108 MMOL/L (ref 96–106)
CHOLEST SERPL-MCNC: 154 MG/DL (ref 100–199)
CO2 SERPL-SCNC: 22 MMOL/L (ref 20–29)
CREAT SERPL-MCNC: 0.73 MG/DL (ref 0.57–1)
EOSINOPHIL # BLD AUTO: 0.1 X10E3/UL (ref 0–0.4)
EOSINOPHIL NFR BLD AUTO: 1 %
ERYTHROCYTE [DISTWIDTH] IN BLOOD BY AUTOMATED COUNT: 13 % (ref 11.7–15.4)
GLOBULIN SER CALC-MCNC: 2.3 G/DL (ref 1.5–4.5)
GLUCOSE SERPL-MCNC: 90 MG/DL (ref 65–99)
HCT VFR BLD AUTO: 38.5 % (ref 34–46.6)
HDLC SERPL-MCNC: 51 MG/DL
HGB BLD-MCNC: 12.7 G/DL (ref 11.1–15.9)
IMM GRANULOCYTES # BLD AUTO: 0 X10E3/UL (ref 0–0.1)
IMM GRANULOCYTES NFR BLD AUTO: 0 %
LDLC SERPL CALC-MCNC: 87 MG/DL (ref 0–99)
LDLC/HDLC SERPL: 1.7 RATIO (ref 0–3.2)
LYMPHOCYTES # BLD AUTO: 1.5 X10E3/UL (ref 0.7–3.1)
LYMPHOCYTES NFR BLD AUTO: 23 %
MCH RBC QN AUTO: 30.6 PG (ref 26.6–33)
MCHC RBC AUTO-ENTMCNC: 33 G/DL (ref 31.5–35.7)
MCV RBC AUTO: 93 FL (ref 79–97)
MONOCYTES # BLD AUTO: 0.4 X10E3/UL (ref 0.1–0.9)
MONOCYTES NFR BLD AUTO: 7 %
NEUTROPHILS # BLD AUTO: 4.5 X10E3/UL (ref 1.4–7)
NEUTROPHILS NFR BLD AUTO: 68 %
PLATELET # BLD AUTO: 249 X10E3/UL (ref 150–450)
POTASSIUM SERPL-SCNC: 4.2 MMOL/L (ref 3.5–5.2)
PROT SERPL-MCNC: 6.4 G/DL (ref 6–8.5)
RBC # BLD AUTO: 4.15 X10E6/UL (ref 3.77–5.28)
SODIUM SERPL-SCNC: 143 MMOL/L (ref 134–144)
TRIGL SERPL-MCNC: 86 MG/DL (ref 0–149)
TSH SERPL DL<=0.005 MIU/L-ACNC: 2.18 UIU/ML (ref 0.45–4.5)
VLDLC SERPL CALC-MCNC: 16 MG/DL (ref 5–40)
WBC # BLD AUTO: 6.5 X10E3/UL (ref 3.4–10.8)

## 2021-10-26 ENCOUNTER — OFFICE VISIT (OUTPATIENT)
Dept: FAMILY MEDICINE CLINIC | Facility: CLINIC | Age: 81
End: 2021-10-26

## 2021-10-26 VITALS
RESPIRATION RATE: 18 BRPM | OXYGEN SATURATION: 97 % | WEIGHT: 156.6 LBS | HEIGHT: 62 IN | SYSTOLIC BLOOD PRESSURE: 122 MMHG | TEMPERATURE: 96 F | DIASTOLIC BLOOD PRESSURE: 50 MMHG | BODY MASS INDEX: 28.82 KG/M2 | HEART RATE: 60 BPM

## 2021-10-26 DIAGNOSIS — M85.80 OSTEOPENIA, UNSPECIFIED LOCATION: ICD-10-CM

## 2021-10-26 DIAGNOSIS — F32.89 OTHER DEPRESSION: ICD-10-CM

## 2021-10-26 DIAGNOSIS — K21.9 GASTROESOPHAGEAL REFLUX DISEASE, UNSPECIFIED WHETHER ESOPHAGITIS PRESENT: ICD-10-CM

## 2021-10-26 DIAGNOSIS — I10 PRIMARY HYPERTENSION: Primary | ICD-10-CM

## 2021-10-26 DIAGNOSIS — E78.5 HYPERLIPIDEMIA, UNSPECIFIED HYPERLIPIDEMIA TYPE: ICD-10-CM

## 2021-10-26 PROCEDURE — 1125F AMNT PAIN NOTED PAIN PRSNT: CPT | Performed by: INTERNAL MEDICINE

## 2021-10-26 PROCEDURE — G0439 PPPS, SUBSEQ VISIT: HCPCS | Performed by: INTERNAL MEDICINE

## 2021-10-26 PROCEDURE — 99214 OFFICE O/P EST MOD 30 MIN: CPT | Performed by: INTERNAL MEDICINE

## 2021-10-26 PROCEDURE — 1170F FXNL STATUS ASSESSED: CPT | Performed by: INTERNAL MEDICINE

## 2021-10-26 PROCEDURE — 1160F RVW MEDS BY RX/DR IN RCRD: CPT | Performed by: INTERNAL MEDICINE

## 2021-10-26 NOTE — PROGRESS NOTES
The ABCs of the Annual Wellness Visit  Subsequent Medicare Wellness Visit    Chief Complaint   Patient presents with   • Medicare Wellness-subsequent     wellness      Subjective    History of Present Illness:  Estela Joshua is a 80 y.o. female who presents for a Subsequent Medicare Wellness Visit, blood pressure check, and lab follow-up.  She has hypertension, hyperlipidemia, osteoarthritis, gastroesophageal reflux, osteopenia, stable depression..  She feels well except for complaints of diffuse arthritis.  She is on diclofenac 75 mg twice a day.  She monitors her blood pressure occasionally and reports normal readings.  She eats healthy and is physically active but does not formally exercise.  He has noted a difference in her strength without exercise.  Bone density test last year showed increasing osteopenia.  She is on vitamin D supplementation and Fosamax.    The following portions of the patient's history were reviewed and   updated as appropriate: allergies, current medications, past family history, past medical history, past social history, past surgical history and problem list.    Compared to one year ago, the patient feels her physical   health is the same.    Compared to one year ago, the patient feels her mental   health is the same.    Recent Hospitalizations:  She was not admitted to the hospital during the last year.       Current Medical Providers:  Patient Care Team:  Robert Miner MD as PCP - General  Robert Miner MD as PCP - Family Medicine    Outpatient Medications Prior to Visit   Medication Sig Dispense Refill   • alendronate (Fosamax) 70 MG tablet Take 1 tablet by mouth Every 7 (Seven) Days. 12 tablet 3   • amLODIPine (NORVASC) 5 MG tablet Take 1 tablet by mouth 2 (Two) Times a Day. 180 tablet 3   • aspirin 81 MG EC tablet Take 81 mg by mouth Daily.     • atorvastatin (LIPITOR) 40 MG tablet Take 1 tablet by mouth Every Night. 90 tablet 3   • BESIVANCE 0.6 % suspension ophthalmic  suspension SHAKE LQ AND INT 1 GTT IN OD BID  1   • budesonide-formoterol (Symbicort) 80-4.5 MCG/ACT inhaler Inhale 2 puffs 2 (Two) Times a Day. 3 inhaler 3   • cefdinir (OMNICEF) 300 MG capsule Take 1 capsule by mouth 2 (Two) Times a Day. 20 capsule 0   • diclofenac (VOLTAREN) 75 MG EC tablet Take 1 tablet by mouth 2 (Two) Times a Day With Meals. 180 tablet 3   • fluticasone (FLONASE) 50 MCG/ACT nasal spray Use 1 spray in each nostril morning and evening 16 g 5   • furosemide (LASIX) 20 MG tablet Take 1 tablet by mouth Daily. 90 tablet 1   • Glucosamine HCl (GLUCOSAMINE PO) Take 50 mg by mouth Daily.     • IRON PO Take  by mouth.     • Multiple Vitamins-Minerals (EYE VITAMINS PO) Take  by mouth.     • pantoprazole (PROTONIX) 40 MG EC tablet Take 1 tablet by mouth Daily. 90 tablet 3   • sertraline (ZOLOFT) 100 MG tablet Take 1 tablet by mouth Daily. 90 tablet 3   • vitamin D (ERGOCALCIFEROL) 81397 units capsule capsule TAKE ONE CAPSULE BY MOUTH ONCE WEEKLY 12 capsule 0     No facility-administered medications prior to visit.       No opioid medication identified on active medication list. I have reviewed chart for other potential  high risk medication/s and harmful drug interactions in the elderly.          Aspirin is on active medication list. Aspirin use is not indicated based on review of current medical condition/s. Risk of harm outweighs potential benefits. Patient instructed to discontinue this medication.  .      Patient Active Problem List   Diagnosis   • Depression   • Hyperlipidemia   • Hypertension   • Visual disturbance   • Vitamin D deficiency   • Imbalance   • Impacted cerumen of right ear   • Shortness of breath on exertion   • Edema   • Restrictive lung disease   • Arthralgia   • GERD (gastroesophageal reflux disease)   • Arthritis of left knee   • Arthritis of knee, right   • Chronic bilateral low back pain without sciatica   • Episodic lightheadedness   • Allergic rhinitis   • Skin lesion   •  "Osteopenia     Advance Care Planning  Advance Directive is not on file.  ACP discussion was held with the patient during this visit. Patient has an advance directive (not in EMR), copy requested.          Objective    Vitals:    10/26/21 0914   BP: 122/50   BP Location: Right arm   Patient Position: Sitting   Pulse: 60   Resp: 18   Temp: 96 °F (35.6 °C)   TempSrc: Oral   SpO2: 97%   Weight: 71 kg (156 lb 9.6 oz)   Height: 157.5 cm (62.01\")     BMI Readings from Last 1 Encounters:   10/26/21 28.64 kg/m²   BMI is above normal parameters. Recommendations include: exercise counseling, nutrition counseling and referral to primary care    Does the patient have evidence of cognitive impairment? No    Physical Exam  Vitals reviewed.   Neck:      Vascular: No carotid bruit.   Cardiovascular:      Rate and Rhythm: Normal rate and regular rhythm.      Heart sounds: Normal heart sounds.   Pulmonary:      Effort: Pulmonary effort is normal.      Breath sounds: Normal breath sounds.   Musculoskeletal:      Right lower leg: No edema.      Left lower leg: No edema.   Neurological:      Mental Status: She is alert.   Psychiatric:         Mood and Affect: Mood normal.         Behavior: Behavior normal.         Thought Content: Thought content normal.         Judgment: Judgment normal.       Lab Results   Component Value Date    GLU 90 10/19/2021    CHLPL 154 10/19/2021    TRIG 86 10/19/2021    HDL 51 10/19/2021    LDL 87 10/19/2021    VLDL 16 10/19/2021            HEALTH RISK ASSESSMENT    Smoking Status:  Social History     Tobacco Use   Smoking Status Former Smoker   • Packs/day: 1.00   • Years: 20.00   • Pack years: 20.00   • Types: Cigarettes   • Quit date:    • Years since quittin.8   Smokeless Tobacco Never Used     Alcohol Consumption:  Social History     Substance and Sexual Activity   Alcohol Use Yes   • Alcohol/week: 2.0 standard drinks   • Types: 1 Glasses of wine, 1 Cans of beer per week     Fall Risk " Screen:    ORENADI Fall Risk Assessment was completed, and patient is at LOW risk for falls.Assessment completed on:10/26/2021    Depression Screening:  PHQ-2/PHQ-9 Depression Screening 10/26/2021   Little interest or pleasure in doing things 0   Feeling down, depressed, or hopeless 0   Total Score 0       Health Habits and Functional and Cognitive Screening:  Functional & Cognitive Status 10/26/2021   Do you have difficulty preparing food and eating? No   Do you have difficulty bathing yourself, getting dressed or grooming yourself? No   Do you have difficulty using the toilet? No   Do you have difficulty moving around from place to place? No   Do you have trouble with steps or getting out of a bed or a chair? No   Current Diet Well Balanced Diet   Dental Exam Up to date   Eye Exam Up to date   Exercise (times per week) 4 times per week   Current Exercises Include Walking;House Cleaning;Yard Work   Current Exercise Activities Include -   Do you need help using the phone?  No   Are you deaf or do you have serious difficulty hearing?  No   Do you need help with transportation? No   Do you need help shopping? No   Do you need help preparing meals?  No   Do you need help with housework?  No   Do you need help with laundry? No   Do you need help taking your medications? No   Do you need help managing money? No   Do you ever drive or ride in a car without wearing a seat belt? No   Have you felt unusual stress, anger or loneliness in the last month? -   Who do you live with? -   If you need help, do you have trouble finding someone available to you? -   Have you been bothered in the last four weeks by sexual problems? -   Do you have difficulty concentrating, remembering or making decisions? -       Age-appropriate Screening Schedule:  Refer to the list below for future screening recommendations based on patient's age, sex and/or medical conditions. Orders for these recommended tests are listed in the plan section. The  patient has been provided with a written plan.    Health Maintenance   Topic Date Due   • LIPID PANEL  10/19/2022   • DXA SCAN  10/29/2022   • MAMMOGRAM  05/20/2023   • TDAP/TD VACCINES (2 - Td or Tdap) 09/01/2028   • INFLUENZA VACCINE  Completed   • ZOSTER VACCINE  Completed              Assessment/Plan  Blood pressure is well controlled.  Lipids are normal.  A complete blood count, fasting glucose, kidney and liver functions, thyroid-stimulating hormone level are normal.  Discussed doing some strengthening exercises.  We will continue current medicines.  CMS Preventative Services Quick Reference  Risk Factors Identified During Encounter  Alcohol Misuse  Cardiovascular Disease  Chronic Pain   Fall Risk-High or Moderate  Glaucoma or Family History of Glaucoma  Hearing Problem  Immunizations Discussed/Encouraged (specific Immunizations; Tdap, Hepatitis A Vaccine/Series, Influenza, Pneumococcal 23, Shingrix and COVID19  Obesity/Overweight   Polypharmacy  The above risks/problems have been discussed with the patient.  Follow up actions/plans if indicated are seen below in the Assessment/Plan Section.  Pertinent information has been shared with the patient in the After Visit Summary.    Diagnoses and all orders for this visit:    1. Primary hypertension (Primary)    2. Hyperlipidemia, unspecified hyperlipidemia type    3. Gastroesophageal reflux disease, unspecified whether esophagitis present    4. Other depression    5. Osteopenia, unspecified location        Follow Up:   Return in about 6 months (around 4/26/2022) for labs BMP, vitamin D.     An After Visit Summary and PPPS were made available to the patient.    {Optional Chart Navigation Links

## 2021-11-02 ENCOUNTER — OFFICE VISIT (OUTPATIENT)
Dept: FAMILY MEDICINE CLINIC | Facility: CLINIC | Age: 81
End: 2021-11-02

## 2021-11-02 VITALS
SYSTOLIC BLOOD PRESSURE: 128 MMHG | HEART RATE: 58 BPM | BODY MASS INDEX: 28.34 KG/M2 | WEIGHT: 154 LBS | OXYGEN SATURATION: 98 % | DIASTOLIC BLOOD PRESSURE: 78 MMHG | TEMPERATURE: 97.4 F | HEIGHT: 62 IN | RESPIRATION RATE: 18 BRPM

## 2021-11-02 DIAGNOSIS — J01.00 ACUTE NON-RECURRENT MAXILLARY SINUSITIS: Primary | ICD-10-CM

## 2021-11-02 DIAGNOSIS — R05.9 COUGH: ICD-10-CM

## 2021-11-02 PROCEDURE — 99213 OFFICE O/P EST LOW 20 MIN: CPT | Performed by: STUDENT IN AN ORGANIZED HEALTH CARE EDUCATION/TRAINING PROGRAM

## 2021-11-02 RX ORDER — GUAIFENESIN/DEXTROMETHORPHAN 100-10MG/5
5 SYRUP ORAL 3 TIMES DAILY PRN
Qty: 236 ML | Refills: 0 | Status: SHIPPED | OUTPATIENT
Start: 2021-11-02 | End: 2021-11-17

## 2021-11-02 RX ORDER — AMOXICILLIN AND CLAVULANATE POTASSIUM 875; 125 MG/1; MG/1
1 TABLET, FILM COATED ORAL 2 TIMES DAILY
Qty: 10 TABLET | Refills: 0 | Status: SHIPPED | OUTPATIENT
Start: 2021-11-02 | End: 2022-05-03

## 2021-11-02 RX ORDER — FLUTICASONE PROPIONATE 50 MCG
SPRAY, SUSPENSION (ML) NASAL
Qty: 16 G | Refills: 5 | Status: SHIPPED | OUTPATIENT
Start: 2021-11-02 | End: 2022-02-01 | Stop reason: SDUPTHER

## 2021-11-02 RX ORDER — DICLOFENAC SODIUM 75 MG/1
75 TABLET, DELAYED RELEASE ORAL 2 TIMES DAILY WITH MEALS
Qty: 180 TABLET | Refills: 3 | Status: SHIPPED | OUTPATIENT
Start: 2021-11-02 | End: 2022-06-07 | Stop reason: SDUPTHER

## 2021-11-02 RX ORDER — SERTRALINE HYDROCHLORIDE 100 MG/1
100 TABLET, FILM COATED ORAL DAILY
Qty: 90 TABLET | Refills: 3 | Status: SHIPPED | OUTPATIENT
Start: 2021-11-02 | End: 2022-02-01 | Stop reason: SDUPTHER

## 2021-11-02 RX ORDER — FUROSEMIDE 20 MG/1
20 TABLET ORAL DAILY
Qty: 90 TABLET | Refills: 1 | Status: SHIPPED | OUTPATIENT
Start: 2021-11-02 | End: 2022-06-07 | Stop reason: SDUPTHER

## 2021-11-02 RX ORDER — BUDESONIDE AND FORMOTEROL FUMARATE DIHYDRATE 80; 4.5 UG/1; UG/1
2 AEROSOL RESPIRATORY (INHALATION)
Qty: 3 EACH | Refills: 3 | Status: SHIPPED | OUTPATIENT
Start: 2021-11-02 | End: 2022-02-21 | Stop reason: SDUPTHER

## 2021-11-02 RX ORDER — ATORVASTATIN CALCIUM 40 MG/1
40 TABLET, FILM COATED ORAL NIGHTLY
Qty: 90 TABLET | Refills: 3 | Status: SHIPPED | OUTPATIENT
Start: 2021-11-02 | End: 2022-02-01 | Stop reason: SDUPTHER

## 2021-11-02 RX ORDER — PANTOPRAZOLE SODIUM 40 MG/1
40 TABLET, DELAYED RELEASE ORAL DAILY
Qty: 90 TABLET | Refills: 3 | Status: SHIPPED | OUTPATIENT
Start: 2021-11-02 | End: 2022-05-05 | Stop reason: SDUPTHER

## 2021-11-02 RX ORDER — ALENDRONATE SODIUM 70 MG/1
70 TABLET ORAL
Qty: 12 TABLET | Refills: 3 | Status: SHIPPED | OUTPATIENT
Start: 2021-11-02 | End: 2022-08-06 | Stop reason: SDUPTHER

## 2021-11-02 RX ORDER — AMLODIPINE BESYLATE 5 MG/1
5 TABLET ORAL 2 TIMES DAILY
Qty: 180 TABLET | Refills: 3 | Status: SHIPPED | OUTPATIENT
Start: 2021-11-02 | End: 2022-05-18 | Stop reason: SDUPTHER

## 2021-11-03 NOTE — PROGRESS NOTES
"Chief Complaint  Headache (sinus pressure, chest congestion,SB,cough )    Subjective          Estela Joshua presents to [facility]  For headache cough, chest congestion, sinus pain.  Patient reported she is having headache, cough, sinus pain for almost more than a week.  She has spells in which she feels like she is going to fall since long time and all her upper respiratory symptoms is causing her spells to exacerbate.  She denies having any fever, chest pain, shortness of breath, nausea, vomiting, abdominal pain, any recent change in bowel and bladder habits.      Objective   Vital Signs:   /78   Pulse 58   Temp 97.4 °F (36.3 °C) (Temporal)   Resp 18   Ht 157.5 cm (62.01\")   Wt 69.9 kg (154 lb)   SpO2 98%   BMI 28.16 kg/m²     Physical Exam  HENT:      Head: Normocephalic and atraumatic.      Mouth/Throat:      Mouth: Mucous membranes are moist.      Pharynx: Oropharynx is clear.   Eyes:      Extraocular Movements: Extraocular movements intact.      Conjunctiva/sclera: Conjunctivae normal.      Pupils: Pupils are equal, round, and reactive to light.   Cardiovascular:      Rate and Rhythm: Normal rate and regular rhythm.   Pulmonary:      Effort: Pulmonary effort is normal.      Breath sounds: Normal breath sounds.   Abdominal:      General: Bowel sounds are normal.      Palpations: Abdomen is soft.   Musculoskeletal:         General: Normal range of motion.      Cervical back: Neck supple.   Skin:     General: Skin is warm.      Capillary Refill: Capillary refill takes less than 2 seconds.   Neurological:      General: No focal deficit present.      Mental Status: She is alert and oriented to person, place, and time. Mental status is at baseline.   Psychiatric:         Mood and Affect: Mood normal.        Result Review :                 Assessment and Plan    Diagnoses and all orders for this visit:    1. Acute non-recurrent maxillary sinusitis " (Primary)  Comments:  Patient is prescribed antibiotics for 5 days.  Orders:  -     amoxicillin-clavulanate (Augmentin) 875-125 MG per tablet; Take 1 tablet by mouth 2 (Two) Times a Day.  Dispense: 10 tablet; Refill: 0    2. Cough  Comments:  Patient can use cough syrup Robitussin-DM as needed  Orders:  -     guaiFENesin-dextromethorphan (ROBITUSSIN DM) 100-10 MG/5ML syrup; Take 5 mL by mouth 3 (Three) Times a Day As Needed for Cough for up to 15 days.  Dispense: 236 mL; Refill: 0        Follow Up   Return if symptoms worsen or fail to improve.  Patient was given instructions and counseling regarding her condition or for health maintenance advice. Please see specific information pulled into the AVS if appropriate.

## 2022-02-01 RX ORDER — ATORVASTATIN CALCIUM 40 MG/1
40 TABLET, FILM COATED ORAL NIGHTLY
Qty: 90 TABLET | Refills: 3 | Status: SHIPPED | OUTPATIENT
Start: 2022-02-01 | End: 2022-05-05 | Stop reason: SDUPTHER

## 2022-02-01 RX ORDER — FLUTICASONE PROPIONATE 50 MCG
SPRAY, SUSPENSION (ML) NASAL
Qty: 16 G | Refills: 5 | Status: SHIPPED | OUTPATIENT
Start: 2022-02-01 | End: 2022-05-05 | Stop reason: SDUPTHER

## 2022-02-01 RX ORDER — SERTRALINE HYDROCHLORIDE 100 MG/1
100 TABLET, FILM COATED ORAL DAILY
Qty: 90 TABLET | Refills: 3 | Status: SHIPPED | OUTPATIENT
Start: 2022-02-01 | End: 2022-05-18 | Stop reason: SDUPTHER

## 2022-02-22 RX ORDER — BUDESONIDE AND FORMOTEROL FUMARATE DIHYDRATE 80; 4.5 UG/1; UG/1
2 AEROSOL RESPIRATORY (INHALATION)
Qty: 3 EACH | Refills: 3 | Status: SHIPPED | OUTPATIENT
Start: 2022-02-22 | End: 2022-08-06 | Stop reason: SDUPTHER

## 2022-04-15 DIAGNOSIS — M85.80 OSTEOPENIA, UNSPECIFIED LOCATION: Primary | ICD-10-CM

## 2022-04-27 LAB
25(OH)D3+25(OH)D2 SERPL-MCNC: 47.3 NG/ML (ref 30–100)
BUN SERPL-MCNC: 15 MG/DL (ref 8–27)
BUN/CREAT SERPL: 23 (ref 12–28)
CALCIUM SERPL-MCNC: 8.8 MG/DL (ref 8.7–10.3)
CHLORIDE SERPL-SCNC: 109 MMOL/L (ref 96–106)
CO2 SERPL-SCNC: 23 MMOL/L (ref 20–29)
CREAT SERPL-MCNC: 0.64 MG/DL (ref 0.57–1)
EGFRCR SERPLBLD CKD-EPI 2021: 89 ML/MIN/1.73
GLUCOSE SERPL-MCNC: 90 MG/DL (ref 65–99)
POTASSIUM SERPL-SCNC: 3.9 MMOL/L (ref 3.5–5.2)
SODIUM SERPL-SCNC: 146 MMOL/L (ref 134–144)

## 2022-05-03 ENCOUNTER — OFFICE VISIT (OUTPATIENT)
Dept: FAMILY MEDICINE CLINIC | Facility: CLINIC | Age: 82
End: 2022-05-03

## 2022-05-03 ENCOUNTER — TRANSCRIBE ORDERS (OUTPATIENT)
Dept: ADMINISTRATIVE | Facility: HOSPITAL | Age: 82
End: 2022-05-03

## 2022-05-03 VITALS
HEIGHT: 62 IN | HEART RATE: 63 BPM | RESPIRATION RATE: 18 BRPM | WEIGHT: 152.6 LBS | BODY MASS INDEX: 28.08 KG/M2 | SYSTOLIC BLOOD PRESSURE: 120 MMHG | OXYGEN SATURATION: 98 % | TEMPERATURE: 97 F | DIASTOLIC BLOOD PRESSURE: 72 MMHG

## 2022-05-03 DIAGNOSIS — M85.80 OSTEOPENIA, UNSPECIFIED LOCATION: ICD-10-CM

## 2022-05-03 DIAGNOSIS — E78.5 HYPERLIPIDEMIA, UNSPECIFIED HYPERLIPIDEMIA TYPE: ICD-10-CM

## 2022-05-03 DIAGNOSIS — Z13.6 SCREENING FOR CARDIOVASCULAR CONDITION: Primary | ICD-10-CM

## 2022-05-03 DIAGNOSIS — I10 PRIMARY HYPERTENSION: Primary | ICD-10-CM

## 2022-05-03 DIAGNOSIS — F32.89 OTHER DEPRESSION: ICD-10-CM

## 2022-05-03 PROCEDURE — 99214 OFFICE O/P EST MOD 30 MIN: CPT | Performed by: INTERNAL MEDICINE

## 2022-05-03 NOTE — PROGRESS NOTES
Juan Manuel Joshua is a 81 y.o. female. Patient is here today for   Chief Complaint   Patient presents with   • Hypertension   • Hyperlipidemia     6MO FU          Vitals:    22 1034   BP: 120/72   Pulse: 63   Resp: 18   Temp: 97 °F (36.1 °C)   SpO2: 98%     Body mass index is 27.9 kg/m².      The following portions of the patient's history were reviewed and updated as appropriate: allergies, current medications, past family history, past medical history, past social history, past surgical history and problem list.    Past Medical History:   Diagnosis Date   • COPD (chronic obstructive pulmonary disease) (HCC)    • Depression    • GERD (gastroesophageal reflux disease)    • Hyperglycemia    • Hyperlipidemia    • Hypertension    • Macular degeneration    • Osteoarthritis       No Known Allergies   Social History     Socioeconomic History   • Marital status:    Tobacco Use   • Smoking status: Former Smoker     Packs/day: 1.00     Years: 20.00     Pack years: 20.00     Types: Cigarettes     Quit date:      Years since quittin.3   • Smokeless tobacco: Never Used   Substance and Sexual Activity   • Alcohol use: Yes     Alcohol/week: 2.0 standard drinks     Types: 1 Glasses of wine, 1 Cans of beer per week   • Drug use: No   • Sexual activity: Defer        Current Outpatient Medications:   •  alendronate (Fosamax) 70 MG tablet, Take 1 tablet by mouth Every 7 (Seven) Days., Disp: 12 tablet, Rfl: 3  •  amLODIPine (NORVASC) 5 MG tablet, Take 1 tablet by mouth 2 (Two) Times a Day., Disp: 180 tablet, Rfl: 3  •  aspirin 81 MG EC tablet, Take 81 mg by mouth Daily., Disp: , Rfl:   •  atorvastatin (LIPITOR) 40 MG tablet, Take 1 tablet by mouth Every Night., Disp: 90 tablet, Rfl: 3  •  BESIVANCE 0.6 % suspension ophthalmic suspension, SHAKE LQ AND INT 1 GTT IN OD BID, Disp: , Rfl: 1  •  budesonide-formoterol (Symbicort) 80-4.5 MCG/ACT inhaler, Inhale 2 puffs 2 (Two) Times a Day., Disp: 3 each, Rfl: 3  •   diclofenac (VOLTAREN) 75 MG EC tablet, Take 1 tablet by mouth 2 (Two) Times a Day With Meals., Disp: 180 tablet, Rfl: 3  •  fluticasone (FLONASE) 50 MCG/ACT nasal spray, Use 1 spray in each nostril morning and evening, Disp: 16 g, Rfl: 5  •  furosemide (LASIX) 20 MG tablet, Take 1 tablet by mouth Daily., Disp: 90 tablet, Rfl: 1  •  Glucosamine HCl (GLUCOSAMINE PO), Take 50 mg by mouth Daily., Disp: , Rfl:   •  IRON PO, Take  by mouth., Disp: , Rfl:   •  Multiple Vitamins-Minerals (EYE VITAMINS PO), Take  by mouth., Disp: , Rfl:   •  pantoprazole (PROTONIX) 40 MG EC tablet, Take 1 tablet by mouth Daily., Disp: 90 tablet, Rfl: 3  •  sertraline (ZOLOFT) 100 MG tablet, Take 1 tablet by mouth Daily., Disp: 90 tablet, Rfl: 3  •  vitamin D (ERGOCALCIFEROL) 92793 units capsule capsule, TAKE ONE CAPSULE BY MOUTH ONCE WEEKLY, Disp: 12 capsule, Rfl: 0     Objective   History of Present Illness Estela is here for blood pressure check and lab follow-up.  She has hypertension, hyperlipidemia, carotid artery atherosclerosis, stable depression, COPD, osteopenia, vitamin D deficiency, gastroesophageal reflux.  She feels well.  She eats healthy and is physically active.  She monitors her blood pressure reports normal readings.  She had vascular screening in November 2020.  She had a cardiac catheterization in 2017 which showed minimal disease.  She wonders if she needs to have an annual mammogram.  Most recent mammogram was 5/20/2021.    Review of Systems   Constitutional: Negative for activity change and unexpected weight change.   Respiratory: Negative.    Cardiovascular: Negative.    Gastrointestinal: Negative.    Genitourinary: Negative.    Neurological: Negative.    Psychiatric/Behavioral: Negative.        Physical Exam  Vitals reviewed.   Constitutional:       Appearance: Normal appearance.   Cardiovascular:      Rate and Rhythm: Normal rate and regular rhythm.      Heart sounds: Normal heart sounds.      Comments:  125/52  Pulmonary:      Effort: Pulmonary effort is normal.      Breath sounds: Normal breath sounds.   Musculoskeletal:      Right lower leg: No edema.      Left lower leg: No edema.   Neurological:      Mental Status: She is alert.   Psychiatric:         Mood and Affect: Mood normal.         Behavior: Behavior normal.         Thought Content: Thought content normal.         Judgment: Judgment normal.         ASSESSMENT blood pressure is well controlled.  Vitamin D level is normal.  Fasting glucose and kidney functions are normal.  Continue healthy heart diet, exercise, and current medicines.  I do recommend annual mammography.     Problems Addressed this Visit        Cardiac and Vasculature    Hyperlipidemia    Hypertension - Primary       Mental Health    Depression       Musculoskeletal and Injuries    Osteopenia      Diagnoses       Codes Comments    Primary hypertension    -  Primary ICD-10-CM: I10  ICD-9-CM: 401.9     Hyperlipidemia, unspecified hyperlipidemia type     ICD-10-CM: E78.5  ICD-9-CM: 272.4     Other depression     ICD-10-CM: F32.89  ICD-9-CM: 311     Osteopenia, unspecified location     ICD-10-CM: M85.80  ICD-9-CM: 733.90           PLAN  There are no Patient Instructions on file for this visit.    Return in about 6 months (around 11/3/2022), or Wellness visit, for labs.  Answers for HPI/ROS submitted by the patient on 5/1/2022  What is the primary reason for your visit?: High Blood Pressure

## 2022-05-06 RX ORDER — FLUTICASONE PROPIONATE 50 MCG
SPRAY, SUSPENSION (ML) NASAL
Qty: 16 G | Refills: 5 | Status: SHIPPED | OUTPATIENT
Start: 2022-05-06 | End: 2022-06-07 | Stop reason: SDUPTHER

## 2022-05-06 RX ORDER — PANTOPRAZOLE SODIUM 40 MG/1
40 TABLET, DELAYED RELEASE ORAL DAILY
Qty: 90 TABLET | Refills: 3 | Status: SHIPPED | OUTPATIENT
Start: 2022-05-06 | End: 2022-05-17 | Stop reason: SDUPTHER

## 2022-05-06 RX ORDER — ATORVASTATIN CALCIUM 40 MG/1
40 TABLET, FILM COATED ORAL NIGHTLY
Qty: 90 TABLET | Refills: 3 | Status: SHIPPED | OUTPATIENT
Start: 2022-05-06 | End: 2022-05-17 | Stop reason: SDUPTHER

## 2022-05-10 ENCOUNTER — HOSPITAL ENCOUNTER (OUTPATIENT)
Dept: CARDIOLOGY | Facility: HOSPITAL | Age: 82
Discharge: HOME | End: 2022-05-10
Admitting: INTERNAL MEDICINE

## 2022-05-10 VITALS
HEIGHT: 63 IN | BODY MASS INDEX: 26.93 KG/M2 | DIASTOLIC BLOOD PRESSURE: 65 MMHG | SYSTOLIC BLOOD PRESSURE: 135 MMHG | HEART RATE: 53 BPM | WEIGHT: 152 LBS

## 2022-05-10 DIAGNOSIS — Z13.6 SCREENING FOR CARDIOVASCULAR CONDITION: ICD-10-CM

## 2022-05-10 LAB
BH CV ECHO MEAS - DIST AO DIAM: NORMAL CM
BH CV VAS BP LEFT ARM: NORMAL MMHG
BH CV VAS BP RIGHT ARM: NORMAL MMHG
BH CV XLRA MEAS - MID AO DIAM: 1.57 CM
BH CV XLRA MEAS - PAD LEFT ABI DP: 1.26
BH CV XLRA MEAS - PAD LEFT ABI PT: 1.29
BH CV XLRA MEAS - PAD LEFT ARM: 135 MMHG
BH CV XLRA MEAS - PAD LEFT LEG DP: 170 MMHG
BH CV XLRA MEAS - PAD LEFT LEG PT: 174 MMHG
BH CV XLRA MEAS - PAD RIGHT ABI DP: 1.23
BH CV XLRA MEAS - PAD RIGHT ABI PT: 1.28
BH CV XLRA MEAS - PAD RIGHT ARM: 133 MMHG
BH CV XLRA MEAS - PAD RIGHT LEG DP: 166 MMHG
BH CV XLRA MEAS - PAD RIGHT LEG PT: 173 MMHG
BH CV XLRA MEAS - PROX AO DIAM: 1.92 CM
BH CV XLRA MEAS LEFT ICA/CCA RATIO: 1.47
BH CV XLRA MEAS LEFT MID CCA PSV: NORMAL CM/SEC
BH CV XLRA MEAS LEFT MID ICA PSV: NORMAL CM/SEC
BH CV XLRA MEAS LEFT PROX ECA PSV: NORMAL CM/SEC
BH CV XLRA MEAS RIGHT ICA/CCA RATIO: 0.75
BH CV XLRA MEAS RIGHT MID CCA PSV: NORMAL CM/SEC
BH CV XLRA MEAS RIGHT MID ICA PSV: NORMAL CM/SEC
BH CV XLRA MEAS RIGHT PROX ECA PSV: NORMAL CM/SEC
MAXIMAL PREDICTED HEART RATE: 139 BPM
STRESS TARGET HR: 118 BPM

## 2022-05-10 PROCEDURE — 93799 UNLISTED CV SVC/PROCEDURE: CPT

## 2022-05-18 RX ORDER — ATORVASTATIN CALCIUM 40 MG/1
40 TABLET, FILM COATED ORAL NIGHTLY
Qty: 90 TABLET | Refills: 3 | Status: SHIPPED | OUTPATIENT
Start: 2022-05-18 | End: 2022-08-06 | Stop reason: SDUPTHER

## 2022-05-18 RX ORDER — PANTOPRAZOLE SODIUM 40 MG/1
40 TABLET, DELAYED RELEASE ORAL DAILY
Qty: 90 TABLET | Refills: 3 | Status: SHIPPED | OUTPATIENT
Start: 2022-05-18 | End: 2022-08-06 | Stop reason: SDUPTHER

## 2022-05-19 RX ORDER — AMLODIPINE BESYLATE 5 MG/1
5 TABLET ORAL 2 TIMES DAILY
Qty: 180 TABLET | Refills: 3 | Status: SHIPPED | OUTPATIENT
Start: 2022-05-19 | End: 2022-08-25 | Stop reason: SDUPTHER

## 2022-05-19 RX ORDER — SERTRALINE HYDROCHLORIDE 100 MG/1
100 TABLET, FILM COATED ORAL DAILY
Qty: 90 TABLET | Refills: 3 | Status: SHIPPED | OUTPATIENT
Start: 2022-05-19 | End: 2022-08-06 | Stop reason: SDUPTHER

## 2022-06-08 RX ORDER — FUROSEMIDE 20 MG/1
20 TABLET ORAL DAILY
Qty: 90 TABLET | Refills: 1 | Status: SHIPPED | OUTPATIENT
Start: 2022-06-08 | End: 2023-02-18 | Stop reason: SDUPTHER

## 2022-06-08 RX ORDER — FLUTICASONE PROPIONATE 50 MCG
SPRAY, SUSPENSION (ML) NASAL
Qty: 16 G | Refills: 5 | Status: SHIPPED | OUTPATIENT
Start: 2022-06-08 | End: 2022-11-09 | Stop reason: SDUPTHER

## 2022-06-08 RX ORDER — DICLOFENAC SODIUM 75 MG/1
75 TABLET, DELAYED RELEASE ORAL 2 TIMES DAILY WITH MEALS
Qty: 180 TABLET | Refills: 3 | Status: SHIPPED | OUTPATIENT
Start: 2022-06-08 | End: 2022-09-21 | Stop reason: SDUPTHER

## 2022-08-08 RX ORDER — ERGOCALCIFEROL 1.25 MG/1
50000 CAPSULE ORAL WEEKLY
Qty: 12 CAPSULE | Refills: 0 | Status: ON HOLD | OUTPATIENT
Start: 2022-08-08 | End: 2023-03-17

## 2022-08-08 RX ORDER — ALENDRONATE SODIUM 70 MG/1
70 TABLET ORAL
Qty: 12 TABLET | Refills: 3 | Status: SHIPPED | OUTPATIENT
Start: 2022-08-08 | End: 2023-02-18 | Stop reason: SDUPTHER

## 2022-08-08 RX ORDER — PANTOPRAZOLE SODIUM 40 MG/1
40 TABLET, DELAYED RELEASE ORAL DAILY
Qty: 90 TABLET | Refills: 3 | Status: SHIPPED | OUTPATIENT
Start: 2022-08-08 | End: 2022-11-09 | Stop reason: SDUPTHER

## 2022-08-08 RX ORDER — SERTRALINE HYDROCHLORIDE 100 MG/1
100 TABLET, FILM COATED ORAL DAILY
Qty: 90 TABLET | Refills: 3 | Status: SHIPPED | OUTPATIENT
Start: 2022-08-08 | End: 2022-11-09 | Stop reason: SDUPTHER

## 2022-08-08 RX ORDER — BUDESONIDE AND FORMOTEROL FUMARATE DIHYDRATE 80; 4.5 UG/1; UG/1
2 AEROSOL RESPIRATORY (INHALATION)
Qty: 3 EACH | Refills: 3 | Status: SHIPPED | OUTPATIENT
Start: 2022-08-08

## 2022-08-08 RX ORDER — ATORVASTATIN CALCIUM 40 MG/1
40 TABLET, FILM COATED ORAL NIGHTLY
Qty: 90 TABLET | Refills: 3 | Status: SHIPPED | OUTPATIENT
Start: 2022-08-08 | End: 2022-11-09 | Stop reason: SDUPTHER

## 2022-08-26 RX ORDER — AMLODIPINE BESYLATE 5 MG/1
5 TABLET ORAL 2 TIMES DAILY
Qty: 180 TABLET | Refills: 3 | Status: SHIPPED | OUTPATIENT
Start: 2022-08-26 | End: 2022-12-08 | Stop reason: SDUPTHER

## 2022-09-21 RX ORDER — DICLOFENAC SODIUM 75 MG/1
75 TABLET, DELAYED RELEASE ORAL 2 TIMES DAILY WITH MEALS
Qty: 180 TABLET | Refills: 3 | Status: SHIPPED | OUTPATIENT
Start: 2022-09-21 | End: 2022-12-29 | Stop reason: SDUPTHER

## 2022-10-11 DIAGNOSIS — E05.90 HYPERTHYROIDISM: ICD-10-CM

## 2022-10-11 DIAGNOSIS — I10 HYPERTENSION, UNSPECIFIED TYPE: ICD-10-CM

## 2022-10-11 DIAGNOSIS — E78.5 HYPERLIPIDEMIA, UNSPECIFIED HYPERLIPIDEMIA TYPE: Primary | ICD-10-CM

## 2022-10-11 DIAGNOSIS — Z12.5 SCREENING FOR PROSTATE CANCER: ICD-10-CM

## 2022-11-11 RX ORDER — ATORVASTATIN CALCIUM 40 MG/1
40 TABLET, FILM COATED ORAL NIGHTLY
Qty: 90 TABLET | Refills: 3 | Status: SHIPPED | OUTPATIENT
Start: 2022-11-11 | End: 2023-02-18 | Stop reason: SDUPTHER

## 2022-11-11 RX ORDER — PANTOPRAZOLE SODIUM 40 MG/1
40 TABLET, DELAYED RELEASE ORAL DAILY
Qty: 90 TABLET | Refills: 3 | Status: SHIPPED | OUTPATIENT
Start: 2022-11-11 | End: 2023-02-09 | Stop reason: SDUPTHER

## 2022-11-11 RX ORDER — FLUTICASONE PROPIONATE 50 MCG
SPRAY, SUSPENSION (ML) NASAL
Qty: 16 G | Refills: 5 | Status: SHIPPED | OUTPATIENT
Start: 2022-11-11 | End: 2023-01-23 | Stop reason: SDUPTHER

## 2022-11-11 RX ORDER — SERTRALINE HYDROCHLORIDE 100 MG/1
100 TABLET, FILM COATED ORAL DAILY
Qty: 90 TABLET | Refills: 3 | Status: SHIPPED | OUTPATIENT
Start: 2022-11-11 | End: 2023-02-18 | Stop reason: SDUPTHER

## 2022-12-08 RX ORDER — AMLODIPINE BESYLATE 5 MG/1
5 TABLET ORAL 2 TIMES DAILY
Qty: 180 TABLET | Refills: 3 | Status: SHIPPED | OUTPATIENT
Start: 2022-12-08 | End: 2023-02-18 | Stop reason: SDUPTHER

## 2022-12-08 NOTE — TELEPHONE ENCOUNTER
Rx Refill Note  Requested Prescriptions     Pending Prescriptions Disp Refills   • amLODIPine (NORVASC) 5 MG tablet 180 tablet 3     Sig: Take 1 tablet by mouth 2 (Two) Times a Day.      Last office visit with prescribing clinician: 5/3/2022   Last telemedicine visit with prescribing clinician: 12/12/2022   Next office visit with prescribing clinician: 12/12/2022                         Would you like a call back once the refill request has been completed: [] Yes [] No    If the office needs to give you a call back, can they leave a voicemail: [] Yes [] No    Blanca Romero MA  12/08/22, 15:40 EST

## 2022-12-12 ENCOUNTER — OFFICE VISIT (OUTPATIENT)
Dept: FAMILY MEDICINE CLINIC | Facility: CLINIC | Age: 82
End: 2022-12-12

## 2022-12-12 VITALS
BODY MASS INDEX: 29.08 KG/M2 | DIASTOLIC BLOOD PRESSURE: 60 MMHG | HEART RATE: 56 BPM | HEIGHT: 62 IN | WEIGHT: 158 LBS | TEMPERATURE: 97.8 F | SYSTOLIC BLOOD PRESSURE: 120 MMHG | RESPIRATION RATE: 16 BRPM | OXYGEN SATURATION: 98 %

## 2022-12-12 DIAGNOSIS — Z78.0 POSTMENOPAUSAL: ICD-10-CM

## 2022-12-12 DIAGNOSIS — E78.5 HYPERLIPIDEMIA, UNSPECIFIED HYPERLIPIDEMIA TYPE: Primary | ICD-10-CM

## 2022-12-12 DIAGNOSIS — K21.9 GASTROESOPHAGEAL REFLUX DISEASE, UNSPECIFIED WHETHER ESOPHAGITIS PRESENT: ICD-10-CM

## 2022-12-12 DIAGNOSIS — M85.80 OSTEOPENIA, UNSPECIFIED LOCATION: ICD-10-CM

## 2022-12-12 DIAGNOSIS — F32.89 OTHER DEPRESSION: ICD-10-CM

## 2022-12-12 DIAGNOSIS — I10 PRIMARY HYPERTENSION: ICD-10-CM

## 2022-12-12 PROCEDURE — 1170F FXNL STATUS ASSESSED: CPT | Performed by: INTERNAL MEDICINE

## 2022-12-12 PROCEDURE — 1160F RVW MEDS BY RX/DR IN RCRD: CPT | Performed by: INTERNAL MEDICINE

## 2022-12-12 PROCEDURE — G0439 PPPS, SUBSEQ VISIT: HCPCS | Performed by: INTERNAL MEDICINE

## 2022-12-12 NOTE — PROGRESS NOTES
The ABCs of the Annual Wellness Visit  Subsequent Medicare Wellness Visit    Juan Manuel Joshua is a 82 y.o. female who presents for a Subsequent Medicare Wellness Visit.    The following portions of the patient's history were reviewed and   updated as appropriate: allergies, current medications, past family history, past medical history, past social history, past surgical history and problem list.    Compared to one year ago, the patient feels her physical   health is the same.    Compared to one year ago, the patient feels her mental   health is worse.    Recent Hospitalizations:  She was not admitted to the hospital during the last year.       Current Medical Providers:  Patient Care Team:  Robert Miner MD as PCP - General  Robert Miner MD as PCP - Family Medicine    Outpatient Medications Prior to Visit   Medication Sig Dispense Refill   • albuterol sulfate  (90 Base) MCG/ACT inhaler Inhale 2 puffs Every 6 (Six) Hours As Needed for Wheezing. 18 g 0   • alendronate (Fosamax) 70 MG tablet Take 1 tablet by mouth Every 7 (Seven) Days. 12 tablet 3   • amLODIPine (NORVASC) 5 MG tablet Take 1 tablet by mouth 2 (Two) Times a Day. 180 tablet 3   • aspirin 81 MG EC tablet Take 81 mg by mouth Daily.     • atorvastatin (LIPITOR) 40 MG tablet Take 1 tablet by mouth Every Night. 90 tablet 3   • BESIVANCE 0.6 % suspension ophthalmic suspension SHAKE LQ AND INT 1 GTT IN OD BID  1   • budesonide-formoterol (Symbicort) 80-4.5 MCG/ACT inhaler Inhale 2 puffs 2 (Two) Times a Day. 3 each 3   • diclofenac (VOLTAREN) 75 MG EC tablet Take 1 tablet by mouth 2 (Two) Times a Day With Meals. 180 tablet 3   • fluticasone (FLONASE) 50 MCG/ACT nasal spray Use 1 spray in each nostril morning and evening 16 g 5   • furosemide (LASIX) 20 MG tablet Take 1 tablet by mouth Daily. 90 tablet 1   • Glucosamine HCl (GLUCOSAMINE PO) Take 50 mg by mouth Daily.     • IRON PO Take  by mouth.     • Multiple Vitamins-Minerals (EYE  "VITAMINS PO) Take  by mouth.     • pantoprazole (PROTONIX) 40 MG EC tablet Take 1 tablet by mouth Daily. 90 tablet 3   • promethazine-dextromethorphan (PROMETHAZINE-DM) 6.25-15 MG/5ML syrup Take 5 mL by mouth 4 (Four) Times a Day As Needed for Cough. 118 mL 0   • sertraline (ZOLOFT) 100 MG tablet Take 1 tablet by mouth Daily. 90 tablet 3   • vitamin D (ERGOCALCIFEROL) 1.25 MG (31322 UT) capsule capsule Take 1 capsule by mouth 1 (One) Time Per Week. 12 capsule 0   • azithromycin (Zithromax Z-Polo) 250 MG tablet Take 2 tablets the first day, then 1 tablet daily for 4 days. 6 tablet 0     No facility-administered medications prior to visit.       No opioid medication identified on active medication list. I have reviewed chart for other potential  high risk medication/s and harmful drug interactions in the elderly.          Aspirin is on active medication list. Aspirin use is not indicated based on review of current medical condition/s. Risk of harm outweighs potential benefits. Patient instructed to discontinue this medication.  .      Patient Active Problem List   Diagnosis   • Depression   • Hyperlipidemia   • Hypertension   • Visual disturbance   • Vitamin D deficiency   • Imbalance   • Impacted cerumen of right ear   • Shortness of breath on exertion   • Edema   • Restrictive lung disease   • Arthralgia   • GERD (gastroesophageal reflux disease)   • Arthritis of left knee   • Arthritis of knee, right   • Chronic bilateral low back pain without sciatica   • Episodic lightheadedness   • Allergic rhinitis   • Skin lesion   • Osteopenia     Advance Care Planning  Advance Directive is not on file.  ACP discussion was held with the patient during this visit. Patient has an advance directive (not in EMR), copy requested.     Objective    Vitals:    12/12/22 1616   BP: 120/60   Pulse: 56   Resp: 16   Temp: 97.8 °F (36.6 °C)   TempSrc: Infrared   SpO2: 98%   Weight: 71.7 kg (158 lb)   Height: 157.5 cm (62.01\")     Estimated " "body mass index is 28.89 kg/m² as calculated from the following:    Height as of this encounter: 157.5 cm (62.01\").    Weight as of this encounter: 71.7 kg (158 lb).    BMI is >= 25 and <30. (Overweight) The following options were offered after discussion;: exercise counseling/recommendations and nutrition counseling/recommendations      Does the patient have evidence of cognitive impairment?   No    Lab Results   Component Value Date    CHLPL 157 10/31/2022    TRIG 87 10/31/2022    HDL 49 10/31/2022    LDL 92 10/31/2022    VLDL 16 10/31/2022          HEALTH RISK ASSESSMENT    Smoking Status:  Social History     Tobacco Use   Smoking Status Former   • Packs/day: 1.00   • Years: 20.00   • Pack years: 20.00   • Types: Cigarettes   • Quit date:    • Years since quittin.9   Smokeless Tobacco Never     Alcohol Consumption:  Social History     Substance and Sexual Activity   Alcohol Use Yes   • Alcohol/week: 2.0 standard drinks   • Types: 1 Glasses of wine, 1 Cans of beer per week     Fall Risk Screen:    NELLA Fall Risk Assessment was completed, and patient is at LOW risk for falls.Assessment completed on:2022    Depression Screening:  PHQ-2/PHQ-9 Depression Screening 2022   Retired PHQ-9 Total Score -   Retired Total Score -   Little Interest or Pleasure in Doing Things 0-->not at all   Feeling Down, Depressed or Hopeless 0-->not at all   PHQ-9: Brief Depression Severity Measure Score 0       Health Habits and Functional and Cognitive Screening:  Functional & Cognitive Status 2022   Do you have difficulty preparing food and eating? No   Do you have difficulty bathing yourself, getting dressed or grooming yourself? No   Do you have difficulty using the toilet? No   Do you have difficulty moving around from place to place? No   Do you have trouble with steps or getting out of a bed or a chair? No   Current Diet Well Balanced Diet   Dental Exam Up to date   Eye Exam Up to date   Exercise (times " per week) 2 times per week   Current Exercises Include Walking   Current Exercise Activities Include -   Do you need help using the phone?  No   Are you deaf or do you have serious difficulty hearing?  Yes   Do you need help with transportation? No   Do you need help shopping? No   Do you need help preparing meals?  No   Do you need help with housework?  No   Do you need help with laundry? No   Do you need help taking your medications? No   Do you need help managing money? No   Do you ever drive or ride in a car without wearing a seat belt? No   Have you felt unusual stress, anger or loneliness in the last month? No   Who do you live with? Other   If you need help, do you have trouble finding someone available to you? No   Have you been bothered in the last four weeks by sexual problems? No   Do you have difficulty concentrating, remembering or making decisions? No       Age-appropriate Screening Schedule:  Refer to the list below for future screening recommendations based on patient's age, sex and/or medical conditions. Orders for these recommended tests are listed in the plan section. The patient has been provided with a written plan.    Health Maintenance   Topic Date Due   • DXA SCAN  10/29/2022   • MAMMOGRAM  05/20/2023   • LIPID PANEL  10/31/2023   • TDAP/TD VACCINES (2 - Td or Tdap) 09/01/2028   • INFLUENZA VACCINE  Completed   • ZOSTER VACCINE  Completed                CMS Preventative Services Quick Reference  Risk Factors Identified During Encounter:    Immunizations Discussed/Encouraged: Tdap, Hepatitis A Vaccine/Series, Influenza, Pneumococcal 23, Shingrix and COVID19    The above risks/problems have been discussed with the patient.  Pertinent information has been shared with the patient in the After Visit Summary.    Diagnoses and all orders for this visit:    1. Hyperlipidemia, unspecified hyperlipidemia type (Primary)    2. Primary hypertension    3. Gastroesophageal reflux disease, unspecified  whether esophagitis present    4. Other depression    5. Osteopenia, unspecified location    Blood pressure is stable.  Labs are stable.  Discussed healthy heart diet and importance of regular exercise program.  Discussed some leg strengthening exercises.  Will order a bone density test.  We will continue current medicines.    Follow Up:   Next Medicare Wellness visit to be scheduled in 1 year.      An After Visit Summary and PPPS were made available to the patient.

## 2022-12-13 ENCOUNTER — TELEPHONE (OUTPATIENT)
Dept: FAMILY MEDICINE CLINIC | Facility: CLINIC | Age: 82
End: 2022-12-13

## 2022-12-13 NOTE — TELEPHONE ENCOUNTER
CALLED PT AND LEFT VM FOR PT TO CALL OFFICE AND SCHEDULE A FASTING LAB APPT AND A FOLLOW UP WITH DR DOUGLASS A FEW DAYS LATER IN June 2023.    HUB TO READ

## 2022-12-30 RX ORDER — DICLOFENAC SODIUM 75 MG/1
75 TABLET, DELAYED RELEASE ORAL 2 TIMES DAILY WITH MEALS
Qty: 180 TABLET | Refills: 1 | Status: SHIPPED | OUTPATIENT
Start: 2022-12-30

## 2022-12-30 NOTE — TELEPHONE ENCOUNTER
Rx Refill Note  Requested Prescriptions     Pending Prescriptions Disp Refills   • diclofenac (VOLTAREN) 75 MG EC tablet 180 tablet 1     Sig: Take 1 tablet by mouth 2 (Two) Times a Day With Meals.      Last office visit with prescribing clinician: 12/12/2022   Last telemedicine visit with prescribing clinician: 6/14/2023   Next office visit with prescribing clinician: 6/20/2023                         Would you like a call back once the refill request has been completed: [] Yes [] No    If the office needs to give you a call back, can they leave a voicemail: [] Yes [] No    Blanca Romero MA  12/30/22, 12:02 EST

## 2023-01-24 RX ORDER — FLUTICASONE PROPIONATE 50 MCG
SPRAY, SUSPENSION (ML) NASAL
Qty: 16 G | Refills: 5 | Status: SHIPPED | OUTPATIENT
Start: 2023-01-24 | End: 2023-02-18 | Stop reason: SDUPTHER

## 2023-02-10 RX ORDER — PANTOPRAZOLE SODIUM 40 MG/1
40 TABLET, DELAYED RELEASE ORAL DAILY
Qty: 90 TABLET | Refills: 3 | Status: SHIPPED | OUTPATIENT
Start: 2023-02-10 | End: 2023-02-18 | Stop reason: SDUPTHER

## 2023-02-20 RX ORDER — ATORVASTATIN CALCIUM 40 MG/1
40 TABLET, FILM COATED ORAL NIGHTLY
Qty: 90 TABLET | Refills: 3 | OUTPATIENT
Start: 2023-02-20

## 2023-02-20 RX ORDER — FLUTICASONE PROPIONATE 50 MCG
SPRAY, SUSPENSION (ML) NASAL
Qty: 16 G | Refills: 5 | OUTPATIENT
Start: 2023-02-20

## 2023-02-20 RX ORDER — SERTRALINE HYDROCHLORIDE 100 MG/1
100 TABLET, FILM COATED ORAL DAILY
Qty: 90 TABLET | Refills: 3 | OUTPATIENT
Start: 2023-02-20

## 2023-02-20 RX ORDER — ATORVASTATIN CALCIUM 40 MG/1
40 TABLET, FILM COATED ORAL NIGHTLY
Qty: 90 TABLET | Refills: 3 | Status: SHIPPED | OUTPATIENT
Start: 2023-02-20

## 2023-02-20 RX ORDER — FLUTICASONE PROPIONATE 50 MCG
SPRAY, SUSPENSION (ML) NASAL
Qty: 16 G | Refills: 5 | Status: SHIPPED | OUTPATIENT
Start: 2023-02-20

## 2023-02-20 RX ORDER — SERTRALINE HYDROCHLORIDE 100 MG/1
100 TABLET, FILM COATED ORAL DAILY
Qty: 90 TABLET | Refills: 3 | Status: SHIPPED | OUTPATIENT
Start: 2023-02-20

## 2023-02-20 RX ORDER — ALENDRONATE SODIUM 70 MG/1
70 TABLET ORAL
Qty: 12 TABLET | Refills: 3 | Status: SHIPPED | OUTPATIENT
Start: 2023-02-20

## 2023-02-20 RX ORDER — AMLODIPINE BESYLATE 5 MG/1
5 TABLET ORAL 2 TIMES DAILY
Qty: 180 TABLET | Refills: 1 | Status: SHIPPED | OUTPATIENT
Start: 2023-02-20

## 2023-02-20 RX ORDER — FUROSEMIDE 20 MG/1
20 TABLET ORAL DAILY
Qty: 90 TABLET | Refills: 1 | Status: SHIPPED | OUTPATIENT
Start: 2023-02-20

## 2023-02-20 RX ORDER — PANTOPRAZOLE SODIUM 40 MG/1
40 TABLET, DELAYED RELEASE ORAL DAILY
Qty: 90 TABLET | Refills: 3 | Status: SHIPPED | OUTPATIENT
Start: 2023-02-20

## 2023-02-20 NOTE — TELEPHONE ENCOUNTER
Rx Refill Note  Requested Prescriptions     Pending Prescriptions Disp Refills   • amLODIPine (NORVASC) 5 MG tablet 180 tablet 0     Sig: Take 1 tablet by mouth 2 (Two) Times a Day.     Signed Prescriptions Disp Refills   • furosemide (LASIX) 20 MG tablet 90 tablet 1     Sig: Take 1 tablet by mouth Daily.     Authorizing Provider: KEIRY DOUGLASS     Ordering User: BLANCA SHIRLEY   • alendronate (Fosamax) 70 MG tablet 12 tablet 3     Sig: Take 1 tablet by mouth Every 7 (Seven) Days.     Authorizing Provider: KEIRY DOUGLASS     Ordering User: BLANCA SHIRLEY      Last office visit with prescribing clinician: 12/12/2022   Last telemedicine visit with prescribing clinician: 2/18/2023   Next office visit with prescribing clinician: 2/18/2023                         Would you like a call back once the refill request has been completed: [] Yes [] No    If the office needs to give you a call back, can they leave a voicemail: [] Yes [] No    Blanca Romero MA  02/20/23, 08:10 EST

## 2023-03-17 ENCOUNTER — NURSE TRIAGE (OUTPATIENT)
Dept: CALL CENTER | Facility: HOSPITAL | Age: 83
End: 2023-03-17
Payer: MEDICARE

## 2023-03-17 ENCOUNTER — APPOINTMENT (OUTPATIENT)
Dept: GENERAL RADIOLOGY | Facility: HOSPITAL | Age: 83
End: 2023-03-17
Payer: MEDICARE

## 2023-03-17 ENCOUNTER — TELEPHONE (OUTPATIENT)
Dept: FAMILY MEDICINE CLINIC | Facility: CLINIC | Age: 83
End: 2023-03-17
Payer: MEDICARE

## 2023-03-17 ENCOUNTER — HOSPITAL ENCOUNTER (OUTPATIENT)
Facility: HOSPITAL | Age: 83
Setting detail: OBSERVATION
Discharge: HOME | End: 2023-03-19
Attending: EMERGENCY MEDICINE | Admitting: HOSPITALIST
Payer: MEDICARE

## 2023-03-17 ENCOUNTER — APPOINTMENT (OUTPATIENT)
Dept: CT IMAGING | Facility: HOSPITAL | Age: 83
End: 2023-03-17
Payer: MEDICARE

## 2023-03-17 DIAGNOSIS — R09.02 HYPOXIA: ICD-10-CM

## 2023-03-17 DIAGNOSIS — G47.33 OSA (OBSTRUCTIVE SLEEP APNEA): ICD-10-CM

## 2023-03-17 DIAGNOSIS — J44.9 CHRONIC OBSTRUCTIVE PULMONARY DISEASE, UNSPECIFIED COPD TYPE: ICD-10-CM

## 2023-03-17 DIAGNOSIS — R06.09 EXERTIONAL DYSPNEA: Primary | ICD-10-CM

## 2023-03-17 LAB
ALBUMIN SERPL-MCNC: 4.1 G/DL (ref 3.5–5.2)
ALBUMIN/GLOB SERPL: 1.7 G/DL
ALP SERPL-CCNC: 29 U/L (ref 39–117)
ALT SERPL W P-5'-P-CCNC: 25 U/L (ref 1–33)
ANION GAP SERPL CALCULATED.3IONS-SCNC: 11.7 MMOL/L (ref 5–15)
AST SERPL-CCNC: 21 U/L (ref 1–32)
ATMOSPHERIC PRESS: 753.4 MMHG
BASE EXCESS BLDV CALC-SCNC: 1.2 MMOL/L (ref -2–2)
BASOPHILS # BLD AUTO: 0.02 10*3/MM3 (ref 0–0.2)
BASOPHILS NFR BLD AUTO: 0.4 % (ref 0–1.5)
BDY SITE: ABNORMAL
BILIRUB SERPL-MCNC: 0.2 MG/DL (ref 0–1.2)
BUN SERPL-MCNC: 29 MG/DL (ref 8–23)
BUN/CREAT SERPL: 35.8 (ref 7–25)
CALCIUM SPEC-SCNC: 9.7 MG/DL (ref 8.6–10.5)
CHLORIDE SERPL-SCNC: 106 MMOL/L (ref 98–107)
CO2 SERPL-SCNC: 24.3 MMOL/L (ref 22–29)
CREAT SERPL-MCNC: 0.81 MG/DL (ref 0.57–1)
DEPRECATED RDW RBC AUTO: 42.1 FL (ref 37–54)
EGFRCR SERPLBLD CKD-EPI 2021: 72.6 ML/MIN/1.73
EOSINOPHIL # BLD AUTO: 0.04 10*3/MM3 (ref 0–0.4)
EOSINOPHIL NFR BLD AUTO: 0.8 % (ref 0.3–6.2)
ERYTHROCYTE [DISTWIDTH] IN BLOOD BY AUTOMATED COUNT: 12.7 % (ref 12.3–15.4)
GEN 5 2HR TROPONIN T REFLEX: 12 NG/L
GLOBULIN UR ELPH-MCNC: 2.4 GM/DL
GLUCOSE SERPL-MCNC: 93 MG/DL (ref 65–99)
HCO3 BLDV-SCNC: 25.1 MMOL/L (ref 22–28)
HCT VFR BLD AUTO: 35.7 % (ref 34–46.6)
HGB BLD-MCNC: 12.2 G/DL (ref 12–15.9)
IMM GRANULOCYTES # BLD AUTO: 0.02 10*3/MM3 (ref 0–0.05)
IMM GRANULOCYTES NFR BLD AUTO: 0.4 % (ref 0–0.5)
LYMPHOCYTES # BLD AUTO: 1.07 10*3/MM3 (ref 0.7–3.1)
LYMPHOCYTES NFR BLD AUTO: 20.3 % (ref 19.6–45.3)
MCH RBC QN AUTO: 31.2 PG (ref 26.6–33)
MCHC RBC AUTO-ENTMCNC: 34.2 G/DL (ref 31.5–35.7)
MCV RBC AUTO: 91.3 FL (ref 79–97)
MODALITY: ABNORMAL
MONOCYTES # BLD AUTO: 0.48 10*3/MM3 (ref 0.1–0.9)
MONOCYTES NFR BLD AUTO: 9.1 % (ref 5–12)
NEUTROPHILS NFR BLD AUTO: 3.63 10*3/MM3 (ref 1.7–7)
NEUTROPHILS NFR BLD AUTO: 69 % (ref 42.7–76)
NRBC BLD AUTO-RTO: 0 /100 WBC (ref 0–0.2)
NT-PROBNP SERPL-MCNC: 502 PG/ML (ref 0–1800)
PCO2 BLDV: 36.5 MM HG (ref 41–51)
PH BLDV: 7.45 PH UNITS (ref 7.31–7.41)
PLATELET # BLD AUTO: 164 10*3/MM3 (ref 140–450)
PMV BLD AUTO: 9.6 FL (ref 6–12)
PO2 BLDV: 42.2 MM HG (ref 35–45)
POTASSIUM SERPL-SCNC: 4 MMOL/L (ref 3.5–5.2)
PROT SERPL-MCNC: 6.5 G/DL (ref 6–8.5)
QT INTERVAL: 422 MS
RBC # BLD AUTO: 3.91 10*6/MM3 (ref 3.77–5.28)
SAO2 % BLDCOA: 80.1 % (ref 92–99)
SODIUM SERPL-SCNC: 142 MMOL/L (ref 136–145)
TOTAL RATE: 18 BREATHS/MINUTE
TROPONIN T DELTA: 2 NG/L
TROPONIN T SERPL HS-MCNC: 10 NG/L
WBC NRBC COR # BLD: 5.26 10*3/MM3 (ref 3.4–10.8)

## 2023-03-17 PROCEDURE — 94799 UNLISTED PULMONARY SVC/PX: CPT

## 2023-03-17 PROCEDURE — 99285 EMERGENCY DEPT VISIT HI MDM: CPT

## 2023-03-17 PROCEDURE — 82803 BLOOD GASES ANY COMBINATION: CPT

## 2023-03-17 PROCEDURE — 83880 ASSAY OF NATRIURETIC PEPTIDE: CPT | Performed by: EMERGENCY MEDICINE

## 2023-03-17 PROCEDURE — 93010 ELECTROCARDIOGRAM REPORT: CPT | Performed by: INTERNAL MEDICINE

## 2023-03-17 PROCEDURE — G0378 HOSPITAL OBSERVATION PER HR: HCPCS

## 2023-03-17 PROCEDURE — 94640 AIRWAY INHALATION TREATMENT: CPT

## 2023-03-17 PROCEDURE — 71250 CT THORAX DX C-: CPT

## 2023-03-17 PROCEDURE — 85025 COMPLETE CBC W/AUTO DIFF WBC: CPT | Performed by: EMERGENCY MEDICINE

## 2023-03-17 PROCEDURE — 96374 THER/PROPH/DIAG INJ IV PUSH: CPT

## 2023-03-17 PROCEDURE — 94664 DEMO&/EVAL PT USE INHALER: CPT

## 2023-03-17 PROCEDURE — 84484 ASSAY OF TROPONIN QUANT: CPT | Performed by: EMERGENCY MEDICINE

## 2023-03-17 PROCEDURE — 80053 COMPREHEN METABOLIC PANEL: CPT | Performed by: EMERGENCY MEDICINE

## 2023-03-17 PROCEDURE — 94761 N-INVAS EAR/PLS OXIMETRY MLT: CPT

## 2023-03-17 PROCEDURE — 71045 X-RAY EXAM CHEST 1 VIEW: CPT

## 2023-03-17 PROCEDURE — 25010000002 METHYLPREDNISOLONE PER 125 MG: Performed by: EMERGENCY MEDICINE

## 2023-03-17 PROCEDURE — 93005 ELECTROCARDIOGRAM TRACING: CPT | Performed by: EMERGENCY MEDICINE

## 2023-03-17 RX ORDER — UREA 10 %
3 LOTION (ML) TOPICAL NIGHTLY PRN
Status: DISCONTINUED | OUTPATIENT
Start: 2023-03-17 | End: 2023-03-19 | Stop reason: HOSPADM

## 2023-03-17 RX ORDER — ONDANSETRON 4 MG/1
4 TABLET, FILM COATED ORAL EVERY 6 HOURS PRN
Status: DISCONTINUED | OUTPATIENT
Start: 2023-03-17 | End: 2023-03-19 | Stop reason: HOSPADM

## 2023-03-17 RX ORDER — ATORVASTATIN CALCIUM 20 MG/1
40 TABLET, FILM COATED ORAL NIGHTLY
Status: DISCONTINUED | OUTPATIENT
Start: 2023-03-17 | End: 2023-03-19 | Stop reason: HOSPADM

## 2023-03-17 RX ORDER — ALBUTEROL SULFATE 2.5 MG/3ML
2.5 SOLUTION RESPIRATORY (INHALATION) EVERY 6 HOURS PRN
Status: DISCONTINUED | OUTPATIENT
Start: 2023-03-17 | End: 2023-03-19 | Stop reason: HOSPADM

## 2023-03-17 RX ORDER — FLUTICASONE PROPIONATE 50 MCG
1 SPRAY, SUSPENSION (ML) NASAL 2 TIMES DAILY
Status: DISCONTINUED | OUTPATIENT
Start: 2023-03-17 | End: 2023-03-19 | Stop reason: HOSPADM

## 2023-03-17 RX ORDER — BUDESONIDE AND FORMOTEROL FUMARATE DIHYDRATE 80; 4.5 UG/1; UG/1
2 AEROSOL RESPIRATORY (INHALATION)
Status: DISCONTINUED | OUTPATIENT
Start: 2023-03-17 | End: 2023-03-19 | Stop reason: HOSPADM

## 2023-03-17 RX ORDER — NAPROXEN 500 MG/1
500 TABLET ORAL 2 TIMES DAILY WITH MEALS
Status: DISCONTINUED | OUTPATIENT
Start: 2023-03-17 | End: 2023-03-18

## 2023-03-17 RX ORDER — FUROSEMIDE 20 MG/1
20 TABLET ORAL DAILY
Status: DISCONTINUED | OUTPATIENT
Start: 2023-03-18 | End: 2023-03-19 | Stop reason: HOSPADM

## 2023-03-17 RX ORDER — ALBUTEROL SULFATE 2.5 MG/3ML
2.5 SOLUTION RESPIRATORY (INHALATION)
Status: COMPLETED | OUTPATIENT
Start: 2023-03-17 | End: 2023-03-17

## 2023-03-17 RX ORDER — ACETAMINOPHEN 325 MG/1
650 TABLET ORAL EVERY 4 HOURS PRN
Status: DISCONTINUED | OUTPATIENT
Start: 2023-03-17 | End: 2023-03-19 | Stop reason: HOSPADM

## 2023-03-17 RX ORDER — ONDANSETRON 2 MG/ML
4 INJECTION INTRAMUSCULAR; INTRAVENOUS EVERY 6 HOURS PRN
Status: DISCONTINUED | OUTPATIENT
Start: 2023-03-17 | End: 2023-03-19 | Stop reason: HOSPADM

## 2023-03-17 RX ORDER — ASPIRIN 81 MG/1
81 TABLET ORAL DAILY
Status: DISCONTINUED | OUTPATIENT
Start: 2023-03-18 | End: 2023-03-19 | Stop reason: HOSPADM

## 2023-03-17 RX ORDER — SERTRALINE HYDROCHLORIDE 100 MG/1
100 TABLET, FILM COATED ORAL DAILY
Status: DISCONTINUED | OUTPATIENT
Start: 2023-03-18 | End: 2023-03-19 | Stop reason: HOSPADM

## 2023-03-17 RX ORDER — SODIUM CHLORIDE 0.9 % (FLUSH) 0.9 %
10 SYRINGE (ML) INJECTION AS NEEDED
Status: DISCONTINUED | OUTPATIENT
Start: 2023-03-17 | End: 2023-03-19 | Stop reason: HOSPADM

## 2023-03-17 RX ORDER — PANTOPRAZOLE SODIUM 40 MG/1
40 TABLET, DELAYED RELEASE ORAL DAILY
Status: DISCONTINUED | OUTPATIENT
Start: 2023-03-18 | End: 2023-03-19 | Stop reason: HOSPADM

## 2023-03-17 RX ORDER — AMLODIPINE BESYLATE 5 MG/1
5 TABLET ORAL 2 TIMES DAILY
Status: DISCONTINUED | OUTPATIENT
Start: 2023-03-17 | End: 2023-03-19

## 2023-03-17 RX ORDER — METHYLPREDNISOLONE SODIUM SUCCINATE 125 MG/2ML
125 INJECTION, POWDER, LYOPHILIZED, FOR SOLUTION INTRAMUSCULAR; INTRAVENOUS ONCE
Status: COMPLETED | OUTPATIENT
Start: 2023-03-17 | End: 2023-03-17

## 2023-03-17 RX ADMIN — ALBUTEROL SULFATE 2.5 MG: 2.5 SOLUTION RESPIRATORY (INHALATION) at 17:54

## 2023-03-17 RX ADMIN — ATORVASTATIN CALCIUM 40 MG: 20 TABLET, FILM COATED ORAL at 22:28

## 2023-03-17 RX ADMIN — METHYLPREDNISOLONE SODIUM SUCCINATE 125 MG: 125 INJECTION, POWDER, FOR SOLUTION INTRAMUSCULAR; INTRAVENOUS at 17:38

## 2023-03-17 RX ADMIN — ALBUTEROL SULFATE 2.5 MG: 2.5 SOLUTION RESPIRATORY (INHALATION) at 17:52

## 2023-03-17 RX ADMIN — ALBUTEROL SULFATE 2.5 MG: 2.5 SOLUTION RESPIRATORY (INHALATION) at 17:53

## 2023-03-17 RX ADMIN — AMLODIPINE BESYLATE 5 MG: 5 TABLET ORAL at 22:28

## 2023-03-17 NOTE — ED PROVIDER NOTES
EMERGENCY DEPARTMENT ENCOUNTER    Room Number:  22/22  Date of encounter:  3/17/2023  PCP: Robert Miner MD  Patient Care Team:  Robert Miner MD as PCP - General  Robert Miner MD as PCP - Family Medicine   Independent Historians: Patient, family    HPI:  Chief Complaint: Shortness of breath    A complete HPI/ROS/PMH/PSH/SH/FH are unobtainable due to: Nothing    Chronic or social conditions impacting patient care (Social Determinants of Health): None  (Financial Resource Strain / Food Insecurity / Transportation Needs / Physical Activity / Stress / Social Connections / Intimate Partner Violence / Housing Stability)    Context: Estela Joshua is a 82 y.o. female who presents to the ED c/o shortness of breath.  She reports for the last several weeks she has had progressively worsening shortness of breath with exertion.  She states she has no symptoms when she is at rest.  She states that she has been having severe shortness of breath with short walks.  She states that is now to the point where she cannot walk across the room without gasping for breath.  She states she cannot climb a flight of stairs without gasping for breath.  She reports in the past she has seen cardiologist.  She states she was told she had COPD.  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  She called her primary care doctor today who directed her to the emergency room.  She reports when she walks any significant distance she gets so short of breath that it takes her several days to recover.    Review of prior external notes (non-ED): Primary care note dated 12/12/2022 with hyperlipidemia, hypertension, GERD.  No mention of exertional dyspnea.  She has a cardiology note dated 5/23/2017 with shortness of breath on exertion.  She had mild diastolic dysfunction on echo.  Stress echo was ordered.  Recommend pulmonology eval.  No pulmonology notes in our system.    Review of prior external test results outside of this encounter:  Laboratory evaluation dated 10/31/2022 with normal CMP, normal CBC    Prescription drug monitoring program review: [facility] reviewed by Jennie Lantigua MD, Jun Paredes MD   My review reveals  no controlled substance prescriptions reported.    PAST MEDICAL HISTORY  Active Ambulatory Problems     Diagnosis Date Noted   • Depression 06/27/2016   • Hyperlipidemia 06/27/2016   • Hypertension 06/27/2016   • Visual disturbance 06/27/2016   • Vitamin D deficiency 06/27/2016   • Imbalance 06/28/2016   • Impacted cerumen of right ear 06/28/2016   • Shortness of breath on exertion 12/23/2016   • Edema 12/23/2016   • Restrictive lung disease 02/09/2017   • Arthralgia 06/27/2017   • GERD (gastroesophageal reflux disease) 07/26/2017   • Arthritis of left knee 08/01/2018   • Arthritis of knee, right 08/01/2018   • Chronic bilateral low back pain without sciatica 08/20/2018   • Episodic lightheadedness 06/17/2019   • Allergic rhinitis 03/16/2020   • Skin lesion 11/12/2020   • Osteopenia 04/06/2021     Resolved Ambulatory Problems     Diagnosis Date Noted   • Impaired fasting glucose 06/27/2016   • Hyperglycemia 06/27/2016   • Anemia 06/28/2016   • Iron deficiency anemia 08/17/2017     Past Medical History:   Diagnosis Date   • COPD (chronic obstructive pulmonary disease) (HCC)    • Macular degeneration    • Osteoarthritis        The patient has a COVID HM Topic on their chart, and they are fully vaccinated.    PAST SURGICAL HISTORY  Past Surgical History:   Procedure Laterality Date   • APPENDECTOMY     • BREAST EXCISIONAL BIOPSY Right     1970's; benign   • CARDIAC CATHETERIZATION     • CARDIAC CATHETERIZATION N/A 1/31/2017    Procedure: Right and Left Heart Cath;  Surgeon: Pramod Salvador MD;  Location: [facility] CATH INVASIVE LOCATION;  Service:    • CARDIAC CATHETERIZATION N/A 1/31/2017    Procedure: Coronary angiography;  Surgeon: Pramod Salvador MD;  Location: [facility] CATH INVASIVE LOCATION;  Service:    •  CARDIAC CATHETERIZATION N/A 2017    Procedure: Left ventriculography;  Surgeon: Pramod Salvador MD;  Location: [facility] CATH INVASIVE LOCATION;  Service:    • CATARACT EXTRACTION     • COLONOSCOPY N/A 2017    Procedure: COLONOSCOPY TO CECUM;  Surgeon: Siddhartha Mujica MD;  Location: [facility] ENDOSCOPY;  Service:    • ENDOSCOPY N/A 2017    Procedure: ESOPHAGOGASTRODUODENOSCOPY WITH COLD BIOPSIES;  Surgeon: Siddhartha Mujica MD;  Location: [facility] ENDOSCOPY;  Service:    • LAPAROSCOPIC TUBAL LIGATION           FAMILY HISTORY  Family History   Problem Relation Age of Onset   • Alzheimer's disease Mother    • Stroke Mother    • Heart failure Father          SOCIAL HISTORY  Social History     Socioeconomic History   • Marital status:    Tobacco Use   • Smoking status: Former     Packs/day: 1.00     Years: 20.00     Pack years: 20.00     Types: Cigarettes     Quit date:      Years since quittin.2   • Smokeless tobacco: Never   Substance and Sexual Activity   • Alcohol use: Yes     Alcohol/week: 2.0 standard drinks     Types: 1 Glasses of wine, 1 Cans of beer per week   • Drug use: No   • Sexual activity: Defer         ALLERGIES  Patient has no known allergies.        REVIEW OF SYSTEMS  Review of Systems   No chest pain, positive shortness of breath, no palpitations, no syncope, no nausea, no vomiting, no abdominal pain, no focal weakness, no focal numbness  All systems reviewed and negative except for those discussed in HPI.       PHYSICAL EXAM    I have reviewed the triage vital signs and nursing notes.    ED Triage Vitals   Temp Heart Rate Resp BP SpO2   23 1418 23 1417 23 1417 -- 23 1417   97.8 °F (36.6 °C) 84 16  100 %      Temp src Heart Rate Source Patient Position BP Location FiO2 (%)   23 1418 -- -- -- --   Tympanic           Physical Exam  GENERAL: Awake, alert, no acute distress  SKIN: Warm, dry  HENT: Normocephalic, atraumatic  EYES: no scleral  icterus  CV: regular rhythm, regular rate  RESPIRATORY: normal effort, lungs clear  ABDOMEN: soft, nontender, nondistended  MUSCULOSKELETAL: no deformity, no calf tenderness or swelling  NEURO: alert, moves all extremities, follows commands          LAB RESULTS  Recent Results (from the past 24 hour(s))   ECG 12 Lead Dyspnea    Collection Time: 03/17/23  3:02 PM   Result Value Ref Range    QT Interval 422 ms   Comprehensive Metabolic Panel    Collection Time: 03/17/23  3:21 PM    Specimen: Blood   Result Value Ref Range    Glucose 93 65 - 99 mg/dL    BUN 29 (H) 8 - 23 mg/dL    Creatinine 0.81 0.57 - 1.00 mg/dL    Sodium 142 136 - 145 mmol/L    Potassium 4.0 3.5 - 5.2 mmol/L    Chloride 106 98 - 107 mmol/L    CO2 24.3 22.0 - 29.0 mmol/L    Calcium 9.7 8.6 - 10.5 mg/dL    Total Protein 6.5 6.0 - 8.5 g/dL    Albumin 4.1 3.5 - 5.2 g/dL    ALT (SGPT) 25 1 - 33 U/L    AST (SGOT) 21 1 - 32 U/L    Alkaline Phosphatase 29 (L) 39 - 117 U/L    Total Bilirubin 0.2 0.0 - 1.2 mg/dL    Globulin 2.4 gm/dL    A/G Ratio 1.7 g/dL    BUN/Creatinine Ratio 35.8 (H) 7.0 - 25.0    Anion Gap 11.7 5.0 - 15.0 mmol/L    eGFR 72.6 >60.0 mL/min/1.73   High Sensitivity Troponin T    Collection Time: 03/17/23  3:21 PM    Specimen: Blood   Result Value Ref Range    HS Troponin T 10 (H) <10 ng/L   BNP    Collection Time: 03/17/23  3:21 PM    Specimen: Blood   Result Value Ref Range    proBNP 502.0 0.0 - 1,800.0 pg/mL   CBC Auto Differential    Collection Time: 03/17/23  3:21 PM    Specimen: Blood   Result Value Ref Range    WBC 5.26 3.40 - 10.80 10*3/mm3    RBC 3.91 3.77 - 5.28 10*6/mm3    Hemoglobin 12.2 12.0 - 15.9 g/dL    Hematocrit 35.7 34.0 - 46.6 %    MCV 91.3 79.0 - 97.0 fL    MCH 31.2 26.6 - 33.0 pg    MCHC 34.2 31.5 - 35.7 g/dL    RDW 12.7 12.3 - 15.4 %    RDW-SD 42.1 37.0 - 54.0 fl    MPV 9.6 6.0 - 12.0 fL    Platelets 164 140 - 450 10*3/mm3    Neutrophil % 69.0 42.7 - 76.0 %    Lymphocyte % 20.3 19.6 - 45.3 %    Monocyte % 9.1 5.0 - 12.0  %    Eosinophil % 0.8 0.3 - 6.2 %    Basophil % 0.4 0.0 - 1.5 %    Immature Grans % 0.4 0.0 - 0.5 %    Neutrophils, Absolute 3.63 1.70 - 7.00 10*3/mm3    Lymphocytes, Absolute 1.07 0.70 - 3.10 10*3/mm3    Monocytes, Absolute 0.48 0.10 - 0.90 10*3/mm3    Eosinophils, Absolute 0.04 0.00 - 0.40 10*3/mm3    Basophils, Absolute 0.02 0.00 - 0.20 10*3/mm3    Immature Grans, Absolute 0.02 0.00 - 0.05 10*3/mm3    nRBC 0.0 0.0 - 0.2 /100 WBC       Ordered the above labs and independently reviewed the results.        RADIOLOGY  XR Chest 1 View    Result Date: 3/17/2023  XR CHEST 1 VW-  HISTORY: Female who is 82 years-old,  short breath  TECHNIQUE: Frontal view of the chest  COMPARISON: 12/01/2014  FINDINGS: The heart size is borderline. Aorta is calcified. Pulmonary vasculature is unremarkable. No focal pulmonary consolidation, pleural effusion, or pneumothorax. ON granulomatous disease is seen. No acute osseous process.      No evidence for acute pulmonary process. Borderline heart size. Follow-up as clinical indications persist.  This report was finalized on 3/17/2023 3:48 PM by Dr. Jayme Snyder M.D.        I ordered the above noted radiological studies. Reviewed by me and discussed with radiologist.  See dictation for official radiology interpretation.      PROCEDURES    Procedures      MEDICATIONS GIVEN IN ER    Medications   sodium chloride 0.9 % flush 10 mL (has no administration in time range)   methylPREDNISolone sodium succinate (SOLU-Medrol) injection 125 mg (has no administration in time range)   albuterol (PROVENTIL) nebulizer solution 0.083% 2.5 mg/3mL (has no administration in time range)         ORDERS PLACED DURING THIS VISIT:  Orders Placed This Encounter   Procedures   • XR Chest 1 View   • CT Chest Without Contrast Diagnostic   • Comprehensive Metabolic Panel   • High Sensitivity Troponin T   • BNP   • CBC Auto Differential   • High Sensitivity Troponin T 2Hr   • Blood Gas, Arterial -   • Monitor Blood  Pressure   • Cardiac Monitoring   • Pulse Oximetry, Continuous   • Check Pulse Oximetry while ambulating   • LHA (on-call MD unless specified) Details   • ECG 12 Lead Dyspnea   • Insert Peripheral IV   • Initiate Observation Status   • CBC & Differential         PROGRESS, DATA ANALYSIS, CONSULTS, AND MEDICAL DECISION MAKING    All labs have been independently interpreted by me.  All radiology studies have been reviewed by me and discussed with radiologist dictating the report.   EKG's independently viewed and interpreted by me.  Discussion below represents my analysis of pertinent findings related to patient's condition, differential diagnosis, treatment plan and final disposition.    Differential diagnosis includes but is not limited to hypoxia, COPD, CHF, ischemic heart disease.    ED Course as of 03/17/23 1722   Fri Mar 17, 2023   1517 EKG          EKG time: 1502  Rhythm/Rate: Normal sinus, rate 61  P waves and LA: Normal P, normal LA  QRS, axis: Narrow QRS, normal axis  ST and T waves: No acute    Independently Interpreted by me  Resolved bradycardia changed compared to prior 1/5/2016   [TR]   1657 Walking O2 sat 83%.  She gets severely winded.  I am ordering steroids and nebulizers.  Plan to admit her to the hospital for further pulmonary evaluation. [TR]   1706 I reviewed the work-up and findings with the patient and family at the bedside.  Answered all questions.  Her chest x-ray is clear.  She has chemistries and blood counts.  With the severity of her exertional dyspnea and hypoxia, plan to admit her to the hospital.  She has never seen a pulmonologist.  They are agreeable to the plan. [TR]   1715 Discussing with Dr. Lantigua with A.  Agrees to admit.  I have ordered a CT of the chest and an ABG to help differentiate the cause of her hypoxia. [TR]      ED Course User Index  [TR] Jun Paredes MD       I interpreted the cardiac monitor rhythm and my independent interpretation is: normal sinus rhythm.      PPE: The patient wore a mask and I wore an N95 mask throughout the entire patient encounter.       AS OF 17:22 EDT VITALS:    BP -    HR - 84  TEMP - 97.8 °F (36.6 °C) (Tympanic)  O2 SATS - 100%        DIAGNOSIS  Final diagnoses:   Exertional dyspnea   Hypoxia   Chronic obstructive pulmonary disease, unspecified COPD type (HCC)         DISPOSITION  ED Disposition     ED Disposition   Decision to Admit    Condition   --    Comment   Level of Care: Telemetry [5]   Diagnosis: Exertional dyspnea [931514]   Admitting Physician: POPPY SWAN [[id number]]   Attending Physician: POPPY SWAN [[id number]]                  Note Disclaimer: At [facility], we believe that sharing information builds trust and better relationships. You are receiving this note because you recently visited [facility]. It is possible you will see health information before a provider has talked with you about it. This kind of information can be easy to misunderstand. To help you fully understand what it means for your health, we urge you to discuss this note with your provider.       Jun Paredes MD  03/17/23 [id number]

## 2023-03-17 NOTE — TELEPHONE ENCOUNTER
PT STATES SHE HAS HAD SHORTNESS OF BREATH GOING ON FOR A LONG TIME (ESTIMATES 5 YEARS) STATES IT HAS BEEN WORSE. STATES IN PAST HAVING A DR DIAGNOSE BEGINNING OF COPD. HAS HAD BRONCHITIS MANY TIMES. STATES SHE FEELS OUT OF BREATH WHEN MOVING ALONG WITH WEAKNESS AND CHEST BEING TIGHT. STATES LEGS FEEL LIKE THEIR MELTING. SUGGESTED PT NEEDS TO GO TO ER, PT REFUSED. SCHEDULED TODAY 03/17/2023.

## 2023-03-17 NOTE — ED NOTES
Ambulated pt in vela with O2 sensor. Pt became soa and dropped to 83% while walking. Pt returned back to room and placed into bed. Pt oxygen level returned to 96% at rest. MD Reggie notified.

## 2023-03-17 NOTE — TELEPHONE ENCOUNTER
"HUB--received call from the HUB, unable to get through to the pts office.  Spoke with pt, Estela, stating that she has been short of air, especially with activity, this comes and goes and has for about 5 yrs.  No oxygen at home.  She was told once she has the beginnings of COPD she stated and was started on Symbicort 2 puffs twice a day.  She does not have a rescue type inhaler in her home.  Pt states she is just worried because it seems to be getting a little worse and she is supposed to go on vacation with her daughter first week of April.  She states that she gets very short of air going up and down stairs/steps, hanging up clothes, etc.  And then her arms and legs feel like they are going to \"melt\" is how she described it.  They become very weak.  She states several times no chest pain, no cough or fever.  I advised her that if she has chest pain and soa to go to the ED, reviewed protocols/guidelines but just wants to see about making a dr appt and not going to ED. She isn't opposed to the ED but does not think it is necessary at this time.  I did a warm transfer to the office at ext [phone] and spoke with Nimco at the office who made her an appt for today with NP at 3:45pm.  Pt verbalized understanding.  "

## 2023-03-17 NOTE — ED NOTES
Nursing report ED to floor  Estela Joshua  82 y.o.  female    HPI :   Chief Complaint   Patient presents with    Shortness of Breath       Admitting doctor:   Jennie Lantigua MD    Admitting diagnosis:   The primary encounter diagnosis was Exertional dyspnea. Diagnoses of Hypoxia and Chronic obstructive pulmonary disease, unspecified COPD type (HCC) were also pertinent to this visit.    Code status:   Current Code Status       Date Active Code Status Order ID Comments User Context       Prior            Allergies:   Patient has no known allergies.    Isolation:   No active isolations    Intake and Output  No intake or output data in the 24 hours ending 03/17/23 1741    Weight:   There were no vitals filed for this visit.    Most recent vitals:   Vitals:    03/17/23 1417 03/17/23 1418   Pulse: 84    Resp: 16    Temp:  97.8 °F (36.6 °C)   TempSrc:  Tympanic   SpO2: 100%        Active LDAs/IV Access:   Lines, Drains & Airways       Active LDAs       Name Placement date Placement time Site Days    Peripheral IV 03/17/23 1520 Right Antecubital 03/17/23  1520  Antecubital  less than 1                    Labs (abnormal labs have a star):   Labs Reviewed   COMPREHENSIVE METABOLIC PANEL - Abnormal; Notable for the following components:       Result Value    BUN 29 (*)     Alkaline Phosphatase 29 (*)     BUN/Creatinine Ratio 35.8 (*)     All other components within normal limits    Narrative:     GFR Normal >60  Chronic Kidney Disease <60  Kidney Failure <15    The GFR formula is only valid for adults with stable renal function between ages 18 and 70.   TROPONIN - Abnormal; Notable for the following components:    HS Troponin T 10 (*)     All other components within normal limits    Narrative:     High Sensitive Troponin T Reference Range:  <10.0 ng/L- Negative Female for AMI  <15.0 ng/L- Negative Male for AMI  >=10 - Abnormal Female indicating possible myocardial injury.  >=15 - Abnormal Male indicating possible  myocardial injury.   Clinicians would have to utilize clinical acumen, EKG, Troponin, and serial changes to determine if it is an Acute Myocardial Infarction or myocardial injury due to an underlying chronic condition.        BNP (IN-HOUSE) - Normal    Narrative:     Among patients with dyspnea, NT-proBNP is highly sensitive for the detection of acute congestive heart failure. In addition NT-proBNP of <300 pg/ml effectively rules out acute congestive heart failure with 99% negative predictive value.    Results may be falsely decreased if patient taking Biotin.     CBC WITH AUTO DIFFERENTIAL - Normal   HIGH SENSITIVITIY TROPONIN T 2HR   BLOOD GAS, ARTERIAL   CBC AND DIFFERENTIAL    Narrative:     The following orders were created for panel order CBC & Differential.  Procedure                               Abnormality         Status                     ---------                               -----------         ------                     CBC Auto Differential[[id number]]        Normal              Final result                 Please view results for these tests on the individual orders.       EKG:   ECG 12 Lead Dyspnea   Final Result   HEART RATE= 61  bpm   RR Interval= 984  ms   NE Interval= 180  ms   P Horizontal Axis= -16  deg   P Front Axis= 43  deg   QRSD Interval= 83  ms   QT Interval= 422  ms   QRS Axis= 16  deg   T Wave Axis= 45  deg   - NORMAL ECG -   Sinus rhythm   No change from previous tracing   Electronically Signed By: Jimena Starr ([facility] 17-Mar-2023 16:30:00   Date and Time of Study: 2023-03-17 15:02:05          Meds given in ED:   Medications   sodium chloride 0.9 % flush 10 mL (has no administration in time range)   albuterol (PROVENTIL) nebulizer solution 0.083% 2.5 mg/3mL (has no administration in time range)   methylPREDNISolone sodium succinate (SOLU-Medrol) injection 125 mg (125 mg Intravenous Given 3/17/23 7796)       Imaging results:  XR Chest 1 View    Result Date: 3/17/2023  No evidence  for acute pulmonary process. Borderline heart size. Follow-up as clinical indications persist.  This report was finalized on 3/17/2023 3:48 PM by Dr. Jayme Snyder M.D.       Ambulatory status:   - up with assist    Social issues:   Social History     Socioeconomic History    Marital status:    Tobacco Use    Smoking status: Former     Packs/day: 1.00     Years: 20.00     Pack years: 20.00     Types: Cigarettes     Quit date:      Years since quittin.2    Smokeless tobacco: Never   Substance and Sexual Activity    Alcohol use: Yes     Alcohol/week: 2.0 standard drinks     Types: 1 Glasses of wine, 1 Cans of beer per week    Drug use: No    Sexual activity: Defer       NIH Stroke Scale:         Bita Hardy RN  23 17:41 EDT

## 2023-03-17 NOTE — ED TRIAGE NOTES
From home with c/o SOA, generalized weakness worsening x 1 month.      Pt wearing mask on arrival.

## 2023-03-17 NOTE — TELEPHONE ENCOUNTER
"  Reason for Disposition  • [1] MILD difficulty breathing (e.g., minimal/no SOB at rest, SOB with walking, pulse <100) AND [2] NEW-onset or WORSE than normal    Additional Information  • Negative: SEVERE difficulty breathing (e.g., struggling for each breath, speaks in single words)  • Negative: [1] Breathing stopped AND [2] hasn't returned  • Negative: Choking on something  • Negative: Bluish (or gray) lips or face now  • Negative: Difficult to awaken or acting confused (e.g., disoriented, slurred speech)  • Negative: Passed out (i.e., lost consciousness, collapsed and was not responding)  • Negative: Wheezing started suddenly after medicine, an allergic food or bee sting  • Negative: Stridor  • Negative: Slow, shallow and weak breathing  • Negative: Sounds like a life-threatening emergency to the triager  • Negative: Chest pain  • Negative: [1] Wheezing (high pitched whistling sound) AND [2] previous asthma attacks or use of asthma medicines  • Negative: [1] Difficulty breathing AND [2] only present when coughing  • Negative: [1] Difficulty breathing AND [2] only from stuffy or runny nose  • Negative: [1] Difficulty breathing AND [2] within 14 days of COVID-19 Exposure  • Negative: [1] MODERATE difficulty breathing (e.g., speaks in phrases, SOB even at rest, pulse 100-120) AND [2] NEW-onset or WORSE than normal  • Negative: Wheezing can be heard across the room  • Negative: Drooling or spitting out saliva (because can't swallow)  • Negative: History of prior \"blood clot\" in leg or lungs (i.e., deep vein thrombosis, pulmonary embolism)  • Negative: History of inherited increased risk of blood clots (e.g., Factor 5 Leiden, Anti-thrombin 3, Protein C or Protein S deficiency, Prothrombin mutation)  • Negative: Major surgery in the past month  • Negative: Hip or leg fracture (broken bone) in past month (or had cast on leg or ankle in past month)  • Negative: Illness requiring prolonged bedrest in past month (e.g., " "immobilization, long hospital stay)  • Negative: Long-distance travel in past month (e.g., car, bus, train, plane; with trip lasting 6 or more hours)  • Negative: Cancer treatment in past six months (or has cancer now)  • Negative: Extra heart beats OR irregular heart beating   (i.e., \"palpitations\")  • Negative: Fever > 103 F (39.4 C)  • Negative: [1] Fever > 101 F (38.3 C) AND [2] age > 60 years  • Negative: [1] Fever > 100.0 F (37.8 C) AND [2] bedridden (e.g., nursing home patient, CVA, chronic illness, recovering from surgery)  • Negative: [1] Fever > 100.0 F (37.8 C) AND [2] diabetes mellitus or weak immune system (e.g., HIV positive, cancer chemo, splenectomy, organ transplant, chronic steroids)  • Negative: [1] Periods where breathing stops and then resumes normally AND [2] bedridden (e.g., nursing home patient, CVA)  • Negative: Pregnant or postpartum (from 0 to 6 weeks after delivery)  • Negative: Patient sounds very sick or weak to the triager  • Negative: [1] Longstanding difficulty breathing (e.g., CHF, COPD, emphysema) AND [2] WORSE than normal  • Negative: [1] Longstanding difficulty breathing AND [2] not responding to usual therapy  • Negative: [1] Continuous (nonstop) coughing AND [2] keeps from working or sleeping  • Negative: [1] MODERATE longstanding difficulty breathing (e.g., speaks in phrases, SOB even at rest, pulse 100-120) AND [2] SAME as normal  • Negative: [1] MILD longstanding difficulty breathing AND [2]  SAME as normal    Answer Assessment - Initial Assessment Questions  1. RESPIRATORY STATUS: \"Describe your breathing?\" (e.g., wheezing, shortness of breath, unable to speak, severe coughing)       Shortness of breath, no coughing, no fever, legs and arms get very tired after soa starts.  2. ONSET: \"When did this breathing problem begin?\"       Worse recently but off and on for 5 yrs--hx of copd and on symbicort  3. PATTERN \"Does the difficult breathing come and go, or has it been " "constant since it started?\"       Comes and goes   4. SEVERITY: \"How bad is your breathing?\" (e.g., mild, moderate, severe)     - MILD: No SOB at rest, mild SOB with walking, speaks normally in sentences, can lay down, no retractions, pulse < 100.     - MODERATE: SOB at rest, SOB with minimal exertion and prefers to sit, cannot lie down flat, speaks in phrases, mild retractions, audible wheezing, pulse 100-120.     - SEVERE: Very SOB at rest, speaks in single words, struggling to breathe, sitting hunched forward, retractions, pulse > 120       Moderate   5. RECURRENT SYMPTOM: \"Have you had difficulty breathing before?\" If Yes, ask: \"When was the last time?\" and \"What happened that time?\"       Yes, today when I go up and down steps or putting up clothes or hanging up clothes, any activity, I get soa easily.  Then my arms and legs feel like they just \"melt\".    6. CARDIAC HISTORY: \"Do you have any history of heart disease?\" (e.g., heart attack, angina, bypass surgery, angioplasty)       no  7. LUNG HISTORY: \"Do you have any history of lung disease?\"  (e.g., pulmonary embolus, asthma, emphysema)      COPD on symbicort   8. CAUSE: \"What do you think is causing the breathing problem?\"       Any activity  9. OTHER SYMPTOMS: \"Do you have any other symptoms? (e.g., dizziness, runny nose, cough, chest pain, fever)     No chest pain/fever or cough--just soa and tiredness   10. PREGNANCY: \"Is there any chance you are pregnant?\" \"When was your last menstrual period?\"        no  11. TRAVEL: \"Have you traveled out of the country in the last month?\" (e.g., travel history, exposures)        no    Protocols used: BREATHING DIFFICULTY-ADULT-AH    "

## 2023-03-18 ENCOUNTER — APPOINTMENT (OUTPATIENT)
Dept: CARDIOLOGY | Facility: HOSPITAL | Age: 83
End: 2023-03-18
Payer: MEDICARE

## 2023-03-18 PROBLEM — R91.1 LUNG NODULE: Status: ACTIVE | Noted: 2023-03-18

## 2023-03-18 PROBLEM — I31.39 PERICARDIAL EFFUSION: Status: ACTIVE | Noted: 2023-03-18

## 2023-03-18 LAB
ANION GAP SERPL CALCULATED.3IONS-SCNC: 9.3 MMOL/L (ref 5–15)
AORTIC DIMENSIONLESS INDEX: 0.7 (DI)
BH CV ECHO MEAS - AI P1/2T: 457.8 MSEC
BH CV ECHO MEAS - AO MAX PG: 13.6 MMHG
BH CV ECHO MEAS - AO MEAN PG: 6.4 MMHG
BH CV ECHO MEAS - AO V2 MAX: 184.2 CM/SEC
BH CV ECHO MEAS - AO V2 VTI: 41.1 CM
BH CV ECHO MEAS - AVA(I,D): 1.81 CM2
BH CV ECHO MEAS - EDV(CUBED): 85.4 ML
BH CV ECHO MEAS - EDV(MOD-SP2): 84 ML
BH CV ECHO MEAS - EDV(MOD-SP4): 87 ML
BH CV ECHO MEAS - EF(MOD-BP): 68.5 %
BH CV ECHO MEAS - EF(MOD-SP2): 71.4 %
BH CV ECHO MEAS - EF(MOD-SP4): 66.7 %
BH CV ECHO MEAS - ESV(CUBED): 16.5 ML
BH CV ECHO MEAS - ESV(MOD-SP2): 24 ML
BH CV ECHO MEAS - ESV(MOD-SP4): 29 ML
BH CV ECHO MEAS - FS: 42.2 %
BH CV ECHO MEAS - IVS/LVPW: 1.05 CM
BH CV ECHO MEAS - IVSD: 0.75 CM
BH CV ECHO MEAS - LAT PEAK E' VEL: 8 CM/SEC
BH CV ECHO MEAS - LV DIASTOLIC VOL/BSA (35-75): 50.7 CM2
BH CV ECHO MEAS - LV MASS(C)D: 97.8 GRAMS
BH CV ECHO MEAS - LV MAX PG: 6 MMHG
BH CV ECHO MEAS - LV MEAN PG: 2.9 MMHG
BH CV ECHO MEAS - LV SYSTOLIC VOL/BSA (12-30): 16.9 CM2
BH CV ECHO MEAS - LV V1 MAX: 122.5 CM/SEC
BH CV ECHO MEAS - LV V1 VTI: 28.3 CM
BH CV ECHO MEAS - LVIDD: 4.4 CM
BH CV ECHO MEAS - LVIDS: 2.5 CM
BH CV ECHO MEAS - LVOT AREA: 2.6 CM2
BH CV ECHO MEAS - LVOT DIAM: 1.83 CM
BH CV ECHO MEAS - LVPWD: 0.72 CM
BH CV ECHO MEAS - MED PEAK E' VEL: 8 CM/SEC
BH CV ECHO MEAS - MV A DUR: 0.17 SEC
BH CV ECHO MEAS - MV A MAX VEL: 118.1 CM/SEC
BH CV ECHO MEAS - MV DEC SLOPE: 491.7 CM/SEC2
BH CV ECHO MEAS - MV DEC TIME: 219 MSEC
BH CV ECHO MEAS - MV E MAX VEL: 113 CM/SEC
BH CV ECHO MEAS - MV E/A: 0.96
BH CV ECHO MEAS - MV MAX PG: 5.3 MMHG
BH CV ECHO MEAS - MV MEAN PG: 1.7 MMHG
BH CV ECHO MEAS - MV P1/2T: 63.5 MSEC
BH CV ECHO MEAS - MV V2 VTI: 37.6 CM
BH CV ECHO MEAS - MVA(P1/2T): 3.5 CM2
BH CV ECHO MEAS - MVA(VTI): 1.97 CM2
BH CV ECHO MEAS - PA ACC TIME: 0.11 SEC
BH CV ECHO MEAS - PA PR(ACCEL): 31.5 MMHG
BH CV ECHO MEAS - PA V2 MAX: 101.6 CM/SEC
BH CV ECHO MEAS - PI END-D VEL: 73.7 CM/SEC
BH CV ECHO MEAS - PULM A REVS DUR: 0.19 SEC
BH CV ECHO MEAS - PULM A REVS VEL: 28.3 CM/SEC
BH CV ECHO MEAS - PULM DIAS VEL: 49.3 CM/SEC
BH CV ECHO MEAS - PULM S/D: 1.35
BH CV ECHO MEAS - PULM SYS VEL: 66.6 CM/SEC
BH CV ECHO MEAS - QP/QS: 0.77
BH CV ECHO MEAS - RAP SYSTOLE: 3 MMHG
BH CV ECHO MEAS - RV MAX PG: 2.5 MMHG
BH CV ECHO MEAS - RV V1 MAX: 79.7 CM/SEC
BH CV ECHO MEAS - RV V1 VTI: 18.1 CM
BH CV ECHO MEAS - RVOT DIAM: 2.01 CM
BH CV ECHO MEAS - RVSP: 40.6 MMHG
BH CV ECHO MEAS - SI(MOD-SP2): 35 ML/M2
BH CV ECHO MEAS - SI(MOD-SP4): 33.8 ML/M2
BH CV ECHO MEAS - SV(LVOT): 74.2 ML
BH CV ECHO MEAS - SV(MOD-SP2): 60 ML
BH CV ECHO MEAS - SV(MOD-SP4): 58 ML
BH CV ECHO MEAS - SV(RVOT): 57.3 ML
BH CV ECHO MEAS - TAPSE (>1.6): 2.3 CM
BH CV ECHO MEAS - TR MAX PG: 37.6 MMHG
BH CV ECHO MEAS - TR MAX VEL: 306.8 CM/SEC
BH CV ECHO MEASUREMENTS AVERAGE E/E' RATIO: 14.13
BH CV LOWER VASCULAR LEFT COMMON FEMORAL AUGMENT: NORMAL
BH CV LOWER VASCULAR LEFT COMMON FEMORAL COMPETENT: NORMAL
BH CV LOWER VASCULAR LEFT COMMON FEMORAL COMPRESS: NORMAL
BH CV LOWER VASCULAR LEFT COMMON FEMORAL PHASIC: NORMAL
BH CV LOWER VASCULAR LEFT COMMON FEMORAL SPONT: NORMAL
BH CV LOWER VASCULAR LEFT DISTAL FEMORAL COMPRESS: NORMAL
BH CV LOWER VASCULAR LEFT GASTRONEMIUS COMPRESS: NORMAL
BH CV LOWER VASCULAR LEFT GREATER SAPH AK COMPRESS: NORMAL
BH CV LOWER VASCULAR LEFT GREATER SAPH BK COMPRESS: NORMAL
BH CV LOWER VASCULAR LEFT LESSER SAPH COMPRESS: NORMAL
BH CV LOWER VASCULAR LEFT MID FEMORAL AUGMENT: NORMAL
BH CV LOWER VASCULAR LEFT MID FEMORAL COMPETENT: NORMAL
BH CV LOWER VASCULAR LEFT MID FEMORAL COMPRESS: NORMAL
BH CV LOWER VASCULAR LEFT MID FEMORAL PHASIC: NORMAL
BH CV LOWER VASCULAR LEFT MID FEMORAL SPONT: NORMAL
BH CV LOWER VASCULAR LEFT PERONEAL COMPRESS: NORMAL
BH CV LOWER VASCULAR LEFT POPLITEAL AUGMENT: NORMAL
BH CV LOWER VASCULAR LEFT POPLITEAL COMPETENT: NORMAL
BH CV LOWER VASCULAR LEFT POPLITEAL COMPRESS: NORMAL
BH CV LOWER VASCULAR LEFT POPLITEAL PHASIC: NORMAL
BH CV LOWER VASCULAR LEFT POPLITEAL SPONT: NORMAL
BH CV LOWER VASCULAR LEFT POSTERIOR TIBIAL COMPRESS: NORMAL
BH CV LOWER VASCULAR LEFT PROFUNDA FEMORAL COMPRESS: NORMAL
BH CV LOWER VASCULAR LEFT PROXIMAL FEMORAL COMPRESS: NORMAL
BH CV LOWER VASCULAR LEFT SAPHENOFEMORAL JUNCTION COMPRESS: NORMAL
BH CV LOWER VASCULAR RIGHT COMMON FEMORAL AUGMENT: NORMAL
BH CV LOWER VASCULAR RIGHT COMMON FEMORAL COMPETENT: NORMAL
BH CV LOWER VASCULAR RIGHT COMMON FEMORAL COMPRESS: NORMAL
BH CV LOWER VASCULAR RIGHT COMMON FEMORAL PHASIC: NORMAL
BH CV LOWER VASCULAR RIGHT COMMON FEMORAL SPONT: NORMAL
BH CV LOWER VASCULAR RIGHT DISTAL FEMORAL COMPRESS: NORMAL
BH CV LOWER VASCULAR RIGHT GASTRONEMIUS COMPRESS: NORMAL
BH CV LOWER VASCULAR RIGHT GREATER SAPH AK COMPRESS: NORMAL
BH CV LOWER VASCULAR RIGHT GREATER SAPH BK COMPRESS: NORMAL
BH CV LOWER VASCULAR RIGHT LESSER SAPH COMPRESS: NORMAL
BH CV LOWER VASCULAR RIGHT MID FEMORAL AUGMENT: NORMAL
BH CV LOWER VASCULAR RIGHT MID FEMORAL COMPETENT: NORMAL
BH CV LOWER VASCULAR RIGHT MID FEMORAL COMPRESS: NORMAL
BH CV LOWER VASCULAR RIGHT MID FEMORAL PHASIC: NORMAL
BH CV LOWER VASCULAR RIGHT MID FEMORAL SPONT: NORMAL
BH CV LOWER VASCULAR RIGHT PERONEAL COMPRESS: NORMAL
BH CV LOWER VASCULAR RIGHT POPLITEAL AUGMENT: NORMAL
BH CV LOWER VASCULAR RIGHT POPLITEAL COMPETENT: NORMAL
BH CV LOWER VASCULAR RIGHT POPLITEAL COMPRESS: NORMAL
BH CV LOWER VASCULAR RIGHT POPLITEAL PHASIC: NORMAL
BH CV LOWER VASCULAR RIGHT POPLITEAL SPONT: NORMAL
BH CV LOWER VASCULAR RIGHT POSTERIOR TIBIAL COMPRESS: NORMAL
BH CV LOWER VASCULAR RIGHT PROFUNDA FEMORAL COMPRESS: NORMAL
BH CV LOWER VASCULAR RIGHT PROXIMAL FEMORAL COMPRESS: NORMAL
BH CV LOWER VASCULAR RIGHT SAPHENOFEMORAL JUNCTION COMPRESS: NORMAL
BH CV LOWER VASCULAR RIGHT SOLEAL COMPRESS: NORMAL
BH CV XLRA - RV BASE: 3.6 CM
BH CV XLRA - RV LENGTH: 6.6 CM
BH CV XLRA - RV MID: 2.5 CM
BH CV XLRA - TDI S': 18.2 CM/SEC
BUN SERPL-MCNC: 20 MG/DL (ref 8–23)
BUN/CREAT SERPL: 34.5 (ref 7–25)
CALCIUM SPEC-SCNC: 9.4 MG/DL (ref 8.6–10.5)
CHLORIDE SERPL-SCNC: 110 MMOL/L (ref 98–107)
CO2 SERPL-SCNC: 22.7 MMOL/L (ref 22–29)
CREAT SERPL-MCNC: 0.58 MG/DL (ref 0.57–1)
CRP SERPL-MCNC: <0.3 MG/DL (ref 0–0.5)
D DIMER PPP FEU-MCNC: <0.27 MCGFEU/ML (ref 0–0.82)
DEPRECATED RDW RBC AUTO: 42 FL (ref 37–54)
EGFRCR SERPLBLD CKD-EPI 2021: 90.5 ML/MIN/1.73
ERYTHROCYTE [DISTWIDTH] IN BLOOD BY AUTOMATED COUNT: 12.6 % (ref 12.3–15.4)
ERYTHROCYTE [SEDIMENTATION RATE] IN BLOOD: 13 MM/HR (ref 0–30)
GLUCOSE SERPL-MCNC: 118 MG/DL (ref 65–99)
HCT VFR BLD AUTO: 35.9 % (ref 34–46.6)
HGB BLD-MCNC: 11.8 G/DL (ref 12–15.9)
LEFT ATRIUM VOLUME INDEX: 37.8 ML/M2
LEFT ATRIUM VOLUME: 65 ML
MAXIMAL PREDICTED HEART RATE: 138 BPM
MAXIMAL PREDICTED HEART RATE: 138 BPM
MCH RBC QN AUTO: 30 PG (ref 26.6–33)
MCHC RBC AUTO-ENTMCNC: 32.9 G/DL (ref 31.5–35.7)
MCV RBC AUTO: 91.3 FL (ref 79–97)
NT-PROBNP SERPL-MCNC: 502 PG/ML (ref 0–1800)
PLATELET # BLD AUTO: 183 10*3/MM3 (ref 140–450)
PMV BLD AUTO: 10.1 FL (ref 6–12)
POTASSIUM SERPL-SCNC: 4.2 MMOL/L (ref 3.5–5.2)
RBC # BLD AUTO: 3.93 10*6/MM3 (ref 3.77–5.28)
SINUS: 2.9 CM
SODIUM SERPL-SCNC: 142 MMOL/L (ref 136–145)
STRESS TARGET HR: 117 BPM
STRESS TARGET HR: 117 BPM
WBC NRBC COR # BLD: 5.4 10*3/MM3 (ref 3.4–10.8)

## 2023-03-18 PROCEDURE — G0378 HOSPITAL OBSERVATION PER HR: HCPCS

## 2023-03-18 PROCEDURE — 85379 FIBRIN DEGRADATION QUANT: CPT | Performed by: INTERNAL MEDICINE

## 2023-03-18 PROCEDURE — 96372 THER/PROPH/DIAG INJ SC/IM: CPT

## 2023-03-18 PROCEDURE — 36415 COLL VENOUS BLD VENIPUNCTURE: CPT | Performed by: INTERNAL MEDICINE

## 2023-03-18 PROCEDURE — 85027 COMPLETE CBC AUTOMATED: CPT | Performed by: INTERNAL MEDICINE

## 2023-03-18 PROCEDURE — 93306 TTE W/DOPPLER COMPLETE: CPT

## 2023-03-18 PROCEDURE — 93306 TTE W/DOPPLER COMPLETE: CPT | Performed by: INTERNAL MEDICINE

## 2023-03-18 PROCEDURE — 85652 RBC SED RATE AUTOMATED: CPT | Performed by: INTERNAL MEDICINE

## 2023-03-18 PROCEDURE — 94799 UNLISTED PULMONARY SVC/PX: CPT

## 2023-03-18 PROCEDURE — 80048 BASIC METABOLIC PNL TOTAL CA: CPT | Performed by: INTERNAL MEDICINE

## 2023-03-18 PROCEDURE — 25010000002 ENOXAPARIN PER 10 MG: Performed by: INTERNAL MEDICINE

## 2023-03-18 PROCEDURE — 93970 EXTREMITY STUDY: CPT

## 2023-03-18 PROCEDURE — 99222 1ST HOSP IP/OBS MODERATE 55: CPT | Performed by: INTERNAL MEDICINE

## 2023-03-18 PROCEDURE — 83880 ASSAY OF NATRIURETIC PEPTIDE: CPT | Performed by: INTERNAL MEDICINE

## 2023-03-18 PROCEDURE — 86140 C-REACTIVE PROTEIN: CPT | Performed by: INTERNAL MEDICINE

## 2023-03-18 RX ORDER — ENOXAPARIN SODIUM 100 MG/ML
40 INJECTION SUBCUTANEOUS EVERY 24 HOURS
Status: DISCONTINUED | OUTPATIENT
Start: 2023-03-18 | End: 2023-03-19 | Stop reason: HOSPADM

## 2023-03-18 RX ADMIN — ENOXAPARIN SODIUM 40 MG: 100 INJECTION SUBCUTANEOUS at 17:52

## 2023-03-18 RX ADMIN — SERTRALINE 100 MG: 100 TABLET, FILM COATED ORAL at 08:46

## 2023-03-18 RX ADMIN — AMLODIPINE BESYLATE 5 MG: 5 TABLET ORAL at 08:46

## 2023-03-18 RX ADMIN — BUDESONIDE AND FORMOTEROL FUMARATE DIHYDRATE 2 PUFF: 80; 4.5 AEROSOL RESPIRATORY (INHALATION) at 20:15

## 2023-03-18 RX ADMIN — BUDESONIDE AND FORMOTEROL FUMARATE DIHYDRATE 2 PUFF: 80; 4.5 AEROSOL RESPIRATORY (INHALATION) at 09:48

## 2023-03-18 RX ADMIN — PANTOPRAZOLE SODIUM 40 MG: 40 TABLET, DELAYED RELEASE ORAL at 08:46

## 2023-03-18 RX ADMIN — ATORVASTATIN CALCIUM 40 MG: 20 TABLET, FILM COATED ORAL at 20:19

## 2023-03-18 RX ADMIN — AMLODIPINE BESYLATE 5 MG: 5 TABLET ORAL at 20:19

## 2023-03-18 RX ADMIN — FLUTICASONE PROPIONATE 1 SPRAY: 50 SPRAY, METERED NASAL at 08:46

## 2023-03-18 RX ADMIN — FUROSEMIDE 20 MG: 20 TABLET ORAL at 08:46

## 2023-03-18 RX ADMIN — FLUTICASONE PROPIONATE 1 SPRAY: 50 SPRAY, METERED NASAL at 20:19

## 2023-03-18 RX ADMIN — ASPIRIN 81 MG: 81 TABLET, COATED ORAL at 08:46

## 2023-03-18 RX ADMIN — NAPROXEN 500 MG: 500 TABLET ORAL at 08:46

## 2023-03-18 NOTE — CONSULTS
Group: [facility]         CONSULT NOTE    Patient Identification:  Estela Joshua  82 y.o.  female  1940  [id number]            Requesting physician: Dr. Lantigua    Reason for Consultation: Shortness of breath    CC: Shortness of breath    History of Present Illness:  Estela Joshua is an 80-year-old female with a past medical history of COPD, GERD, hyperlipidemia, hyperglycemia, hypertension, and osteoarthritis.  He is a former cigarette smoker, smoking 1 pack/day for 20 years, quit date 1987.    She has not been evaluated by pulmonology, however it has been diagnosed with COPD by her primary care provider who prescribes her Brovana daily.  She has not seen a cardiologist since 2017    She presented to the emergency department complaints of shortness of breath.  Patient reports that for the last several weeks has had progressively worsening shortness of breath with exertion.  She reports that she has previously been seen by cardiologist and was told she had COPD and a congenital heart defect, although she does not know specifics on this information.  Prior to arrival she called her primary care provider who referred her to the emergency room.  She had a walking oximetry test in the ED, and her oxygen saturations dropped to 83% with exertion.  She received albuterol and methylprednisolone in the ER and was admitted for exertional dyspnea.    Since admission, patient had a CT of her chest which showed a somewhat nodular area of groundglass/pulmonary opacification in the posterior aspect of the right upper lobe measuring 2.1 cm and a small to moderate pericardial effusion.  Venous blood gas was drawn on room air which showed a pH of 7.445 and a venous CO2 of 36.5.      Review of Systems:  Constitutional: Negative for appetite change, chills, diaphoresis, fever.  Positive for activity change and fatigue.  HEENT: Negative for congestion, postnasal drip, rhinorrhea pressure.  Negative for eye discharge,  itching or pain.  Respiratory: Negative for apnea, choking, cough, wheezing.  Positive for chest tightness and shortness of breath with exertion.  Cardiac: Negative for palpitations.  Positive for intermittent leg swelling and intermittent chest pain with exertion.  GI: Negative for abdominal distention, abdominal pain, constipation, diarrhea, nausea or vomiting.  : Negative for dysuria, frequency or urgency.  Musculoskeletal: Negative for arthralgias or myalgias.  Skin: Negative for color change, pallor, rash or wound.  Neurological: Negative for dizziness, headaches, syncope, or weakness.    Past Medical History:  Past Medical History:   Diagnosis Date   • COPD (chronic obstructive pulmonary disease) (HCC)    • Depression    • GERD (gastroesophageal reflux disease)    • Hyperglycemia    • Hyperlipidemia    • Hypertension    • Macular degeneration    • Osteoarthritis        Past Surgical History:  Past Surgical History:   Procedure Laterality Date   • APPENDECTOMY     • BREAST EXCISIONAL BIOPSY Right     1970's; benign   • CARDIAC CATHETERIZATION     • CARDIAC CATHETERIZATION N/A 1/31/2017    Procedure: Right and Left Heart Cath;  Surgeon: Pramod Salvador MD;  Location: [facility] CATH INVASIVE LOCATION;  Service:    • CARDIAC CATHETERIZATION N/A 1/31/2017    Procedure: Coronary angiography;  Surgeon: Pramod Salvador MD;  Location: [facility] CATH INVASIVE LOCATION;  Service:    • CARDIAC CATHETERIZATION N/A 1/31/2017    Procedure: Left ventriculography;  Surgeon: Pramod Salvador MD;  Location: [facility] CATH INVASIVE LOCATION;  Service:    • CATARACT EXTRACTION     • COLONOSCOPY N/A 9/1/2017    Procedure: COLONOSCOPY TO CECUM;  Surgeon: Siddhartha Mujica MD;  Location: [facility] ENDOSCOPY;  Service:    • ENDOSCOPY N/A 9/1/2017    Procedure: ESOPHAGOGASTRODUODENOSCOPY WITH COLD BIOPSIES;  Surgeon: Siddhartha Mujica MD;  Location: [facility] ENDOSCOPY;  Service:    • LAPAROSCOPIC TUBAL LIGATION           Home Meds:  Medications Prior to Admission   Medication Sig Dispense Refill Last Dose   • albuterol sulfate  (90 Base) MCG/ACT inhaler Inhale 2 puffs Every 6 (Six) Hours As Needed for Wheezing. 18 g 0 3/17/2023   • alendronate (Fosamax) 70 MG tablet Take 1 tablet by mouth Every 7 (Seven) Days. 12 tablet 3 3/16/2023   • amLODIPine (NORVASC) 5 MG tablet Take 1 tablet by mouth 2 (Two) Times a Day. 180 tablet 1 3/17/2023   • atorvastatin (LIPITOR) 40 MG tablet Take 1 tablet by mouth Every Night. 90 tablet 3 3/16/2023   • budesonide-formoterol (Symbicort) 80-4.5 MCG/ACT inhaler Inhale 2 puffs 2 (Two) Times a Day. 3 each 3 3/17/2023   • diclofenac (VOLTAREN) 75 MG EC tablet Take 1 tablet by mouth 2 (Two) Times a Day With Meals. 180 tablet 1 3/17/2023   • fluticasone (FLONASE) 50 MCG/ACT nasal spray Use 1 spray in each nostril morning and evening 16 g 5 3/17/2023   • furosemide (LASIX) 20 MG tablet Take 1 tablet by mouth Daily. 90 tablet 1 3/17/2023   • Glucosamine HCl (GLUCOSAMINE PO) Take 50 mg by mouth Daily.   3/17/2023   • Multiple Vitamins-Minerals (EYE VITAMINS PO) Take  by mouth.   3/17/2023   • pantoprazole (PROTONIX) 40 MG EC tablet Take 1 tablet by mouth Daily. 90 tablet 3 3/17/2023   • sertraline (ZOLOFT) 100 MG tablet Take 1 tablet by mouth Daily. 90 tablet 3 3/17/2023   • aspirin 81 MG EC tablet Take 1 tablet by mouth Daily.   More than a month   • BESIVANCE 0.6 % suspension ophthalmic suspension SHAKE LQ AND INT 1 GTT IN OD BID  1 More than a month       Allergies:  No Known Allergies    Social History:   Social History     Socioeconomic History   • Marital status:    Tobacco Use   • Smoking status: Former     Packs/day: 1.00     Years: 20.00     Pack years: 20.00     Types: Cigarettes     Quit date:      Years since quittin.2   • Smokeless tobacco: Never   Substance and Sexual Activity   • Alcohol use: Yes     Alcohol/week: 2.0 standard drinks     Types: 1 Glasses of wine, 1 Cans of  "beer per week   • Drug use: No   • Sexual activity: Defer       Family History:  Family History   Problem Relation Age of Onset   • Alzheimer's disease Mother    • Stroke Mother    • Heart failure Father        Physical Exam:  /51   Pulse 71   Temp 98.3 °F (36.8 °C) (Oral)   Resp 16   Ht 157.5 cm (62\")   Wt 70.9 kg (156 lb 4.9 oz)   SpO2 98%   BMI 28.59 kg/m²  Body mass index is 28.59 kg/m². 98% 70.9 kg (156 lb 4.9 oz)     Physical Exam:  Constitutional: Alert and normally appearing female laying in bed.  Does not appear to be in acute distress, does appear to get winded with conversation  Head: Normocephalic, atraumatic  Mouth/throat: Moist mucous membranes.  Eyes: PERRLA, EOM intact, normal conjunctivae.  Neck: Normal range of motion, supple, no JVD, trachea midline.  Cardiovascular: Normal rate, regular rhythm, normal S1-S2  Pulmonary: Normal respiratory effort, breath sounds diminished but clear, no wheezes, rhonchi or rales.  Abdominal: Flat, soft, nontender, normal bowel sounds  : Not assessed  Musculoskeletal: Normal range of motion, no swelling, tenderness or edema.  Skin: Warm, dry, no erythema or ecchymosis.  Capillary fill less than 3 seconds.  Neurological: Alert and orient x3, no focal deficit.    LABS:  SARS-CoV-2, JONE   Date Value Ref Range Status   09/08/2022 Not Detected Not Detected Final     Comment:     This nucleic acid amplification test was developed and its performance  characteristics determined by [facility]. Nucleic acid  amplification tests include RT-PCR and TMA. This test has not been  FDA cleared or approved. This test has been authorized by FDA under  an Emergency Use Authorization (EUA). This test is only authorized  for the duration of time the declaration that circumstances exist  justifying the authorization of the emergency use of in vitro  diagnostic tests for detection of SARS-CoV-2 virus and/or diagnosis  of COVID-19 infection under section 564(b)(1) " of the Act, 21 U.S.C.  360bbb-3(b) (1), unless the authorization is terminated or revoked  sooner.  When diagnostic testing is negative, the possibility of a false  negative result should be considered in the context of a patient's  recent exposures and the presence of clinical signs and symptoms  consistent with COVID-19. An individual without symptoms of COVID-19  and who is not shedding SARS-CoV-2 virus would expect to have a  negative (not detected) result in this assay.       Lab Results   Component Value Date    CALCIUM 9.7 03/17/2023     Results from last 7 days   Lab Units 03/17/23  1521   SODIUM mmol/L 142   POTASSIUM mmol/L 4.0   CHLORIDE mmol/L 106   CO2 mmol/L 24.3   BUN mg/dL 29*   CREATININE mg/dL 0.81   GLUCOSE mg/dL 93   CALCIUM mg/dL 9.7   WBC 10*3/mm3 5.26   HEMOGLOBIN g/dL 12.2   PLATELETS 10*3/mm3 164   ALT (SGPT) U/L 25   AST (SGOT) U/L 21   PROBNP pg/mL 502.0     Lab Results   Component Value Date    TROPONINT 12 (H) 03/17/2023     Results from last 7 days   Lab Units 03/17/23  1757 03/17/23  1521   HSTROP T ng/L 12* 10*             Results from last 7 days   Lab Units 03/17/23  1758   MODALITY  Room Air   O2 SATURATION CALC % 80.1*                 Lab Results   Component Value Date    TSH 2.750 10/31/2022     Estimated Creatinine Clearance: 49.4 mL/min (by C-G formula based on SCr of 0.81 mg/dL).         Imaging: I personally visualized the images of scans/x-rays performed within last 3 days.        Assessment:  1. Dyspnea on exertion  2. Stable angina  3. Small to moderate pericardial effusion on CT chest from 3/17/2023  4. Moderate mitral regurgitation on ECHO from 1/10/17  5. Scattered pulmonary nodules on CT chest from 3/17/2023  6. Hyperlipidemia  7. Hypertension  8. Pedal edema  9. GERD  10. Obesity, BMI 28.59  11. Former tobacco abuse, total 30 pack years    Recommendations:  Dyspnea on exertion  · Unclear if cardiac or pulmonary in etiology.   · Patient has a documented reported  history of COPD, however denies having pulmonary function testing performed or being evaluated by pulmonologist.  Recommend PFTs performed outpatient once patient stabilizes.  For now, continue home Symbicort with as needed albuterol for shortness of breath/wheezing.  · Patient denies chronic cough, change in environment, or history of asthma.  · Patient did have a full cardiac work-up in 2017 including negative stress test and right and left heart catheterizations.  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  There were normal right heart hemodynamics and no pulmonary hypertension.  · Echocardiogram ordered per primary.    ? COPD  · No wheezing evident on exam, recommend against steroids for now.  · Continue Symbicort.     Stable angina  · Patient reports chest tightness, described as tightening around her bra line with exertion, improved with rest.  Troponin was only mildly elevated and proBNP 502.0.  · Given dyspnea and chest tightness, may want to consult cardiology for further ischemic/right heart evaluation.    Former tobacco abuse/pulmonary nodules  · CT chest from 3/17/2023 showed nodule area of groundglass/pulmonary opacification in the posterior aspect of right upper lobe measuring 2.1 cm and a few scattered sub-6 mm pulmonary nodules.  Recommend close follow-up with pulmonology and repeat imaging.    Patient was placed in face mask upon entering room and kept mask on throughout our encounter. I wore full protective equipment throughout this patient encounter including a face mask, gown and gloves. Hand hygiene was performed before donning protective equipment and after removal when leaving the room.    Latonya Shah, APRN  3/17/2023  22:52 EDT      Much of this encounter note is an electronic transcription/translation of spoken language to printed text using Dragon Software.

## 2023-03-18 NOTE — H&P
HISTORY AND PHYSICAL   [facility]        Date of Admission: 3/17/2023  Patient Identification:  Name: Estela Joshua  Age: 82 y.o.  Sex: female  :  1940  MRN: [id number]                     Primary Care Physician: Robert Miner MD    Chief Complaint:  82 year old female presented to the emergency room with shortness of breath which has been progressing for the last few weeks; she has been intermittently short of air for some time and was seen by cardiology and had a negative workup; she denies fever or chills; shortness of breath is worse with exertion    History of Present Illness:   As above    Past Medical History:  Past Medical History:   Diagnosis Date   • COPD (chronic obstructive pulmonary disease) (HCC)    • Depression    • GERD (gastroesophageal reflux disease)    • Hyperglycemia    • Hyperlipidemia    • Hypertension    • Macular degeneration    • Osteoarthritis      Past Surgical History:  Past Surgical History:   Procedure Laterality Date   • APPENDECTOMY     • BREAST EXCISIONAL BIOPSY Right     ; benign   • CARDIAC CATHETERIZATION     • CARDIAC CATHETERIZATION N/A 2017    Procedure: Right and Left Heart Cath;  Surgeon: Pramod Salvador MD;  Location: [facility] CATH INVASIVE LOCATION;  Service:    • CARDIAC CATHETERIZATION N/A 2017    Procedure: Coronary angiography;  Surgeon: Pramod Salvador MD;  Location: [facility] CATH INVASIVE LOCATION;  Service:    • CARDIAC CATHETERIZATION N/A 2017    Procedure: Left ventriculography;  Surgeon: Pramod Salvador MD;  Location: [facility] CATH INVASIVE LOCATION;  Service:    • CATARACT EXTRACTION     • COLONOSCOPY N/A 2017    Procedure: COLONOSCOPY TO CECUM;  Surgeon: Siddhartha Mujica MD;  Location: [facility] ENDOSCOPY;  Service:    • ENDOSCOPY N/A 2017    Procedure: ESOPHAGOGASTRODUODENOSCOPY WITH COLD BIOPSIES;  Surgeon: Siddhartha Mujica MD;  Location: [facility] ENDOSCOPY;  Service:    • LAPAROSCOPIC  TUBAL LIGATION        Home Meds:  Medications Prior to Admission   Medication Sig Dispense Refill Last Dose   • albuterol sulfate  (90 Base) MCG/ACT inhaler Inhale 2 puffs Every 6 (Six) Hours As Needed for Wheezing. 18 g 0 3/17/2023   • alendronate (Fosamax) 70 MG tablet Take 1 tablet by mouth Every 7 (Seven) Days. 12 tablet 3 3/16/2023   • amLODIPine (NORVASC) 5 MG tablet Take 1 tablet by mouth 2 (Two) Times a Day. 180 tablet 1 3/17/2023   • atorvastatin (LIPITOR) 40 MG tablet Take 1 tablet by mouth Every Night. 90 tablet 3 3/16/2023   • budesonide-formoterol (Symbicort) 80-4.5 MCG/ACT inhaler Inhale 2 puffs 2 (Two) Times a Day. 3 each 3 3/17/2023   • diclofenac (VOLTAREN) 75 MG EC tablet Take 1 tablet by mouth 2 (Two) Times a Day With Meals. 180 tablet 1 3/17/2023   • fluticasone (FLONASE) 50 MCG/ACT nasal spray Use 1 spray in each nostril morning and evening 16 g 5 3/17/2023   • furosemide (LASIX) 20 MG tablet Take 1 tablet by mouth Daily. 90 tablet 1 3/17/2023   • Glucosamine HCl (GLUCOSAMINE PO) Take 50 mg by mouth Daily.   3/17/2023   • Multiple Vitamins-Minerals (EYE VITAMINS PO) Take  by mouth.   3/17/2023   • pantoprazole (PROTONIX) 40 MG EC tablet Take 1 tablet by mouth Daily. 90 tablet 3 3/17/2023   • sertraline (ZOLOFT) 100 MG tablet Take 1 tablet by mouth Daily. 90 tablet 3 3/17/2023   • aspirin 81 MG EC tablet Take 1 tablet by mouth Daily.   More than a month   • BESIVANCE 0.6 % suspension ophthalmic suspension SHAKE LQ AND INT 1 GTT IN OD BID  1 More than a month       Allergies:  No Known Allergies  Immunizations:  Immunization History   Administered Date(s) Administered   • COVID-19 (PFIZER) BIVALENT BOOSTER 12+YRS 10/18/2022   • COVID-19 (PFIZER) PURPLE CAP 03/03/2021, 03/24/2021, 10/05/2021   • Covid-19 (Pfizer) Gray Cap 07/27/2022   • FLUAD TRI 65YR+ 10/14/2019   • Fluad Quad 65+ 10/14/2019, 09/29/2020, 10/17/2022   • Fluzone High Dose =>65 Years ([employer] ONLY) 10/15/2018   • Fluzone  High-Dose 65+yrs 10/10/2021   • Influenza, Unspecified 10/14/2019   • Pneumococcal Conjugate 13-Valent (PCV13) 2012   • Pneumococcal Polysaccharide (PPSV23) 2010   • Shingrix 10/15/2020, 2021, 2021   • Tdap 2018   • Zostavax 2014     Social History:   Social History     Social History Narrative   • Not on file     Social History     Socioeconomic History   • Marital status:    Tobacco Use   • Smoking status: Former     Packs/day: 1.00     Years: 20.00     Pack years: 20.00     Types: Cigarettes     Quit date:      Years since quittin.2   • Smokeless tobacco: Never   Substance and Sexual Activity   • Alcohol use: Yes     Alcohol/week: 2.0 standard drinks     Types: 1 Glasses of wine, 1 Cans of beer per week   • Drug use: No   • Sexual activity: Defer       Family History:  Family History   Problem Relation Age of Onset   • Alzheimer's disease Mother    • Stroke Mother    • Heart failure Father         Review of Systems  See history of present illness and past medical history.  Patient denies headache, dizziness, syncope, falls, trauma, change in vision, change in hearing, change in taste, changes in weight, changes in appetite, focal weakness, numbness, or paresthesia.  Patient denies chest pain,  cough, sinus pressure, rhinorrhea, epistaxis, hemoptysis, nausea, vomiting,hematemesis, diarrhea, constipation or hematchezia.  Denies cold or heat intolerance, polydipsia, polyuria, polyphagia. Denies hematuria, pyuria, dysuria, hesitancy, frequency or urgency. Denies consumption of raw and under cooked meats foods or change in water source.  Denies fever, chills, sweats, night sweats.  Denies missing any routine medications. Remainder of ROS is negative.    Objective:  T Max 24 hrs: Temp (24hrs), Av.1 °F (36.7 °C), Min:97.8 °F (36.6 °C), Max:98.3 °F (36.8 °C)    Vitals Ranges:   Temp:  [97.8 °F (36.6 °C)-98.3 °F (36.8 °C)] 98.3 °F (36.8 °C)  Heart Rate:  [53-84]  "74  Resp:  [16] 16  BP: (139-159)/(60-65) 139/65      Exam:  /65 (BP Location: Right arm, Patient Position: Sitting)   Pulse 74   Temp 98.3 °F (36.8 °C) (Oral)   Resp 16   Ht 157.5 cm (62\")   Wt 70.9 kg (156 lb 4.9 oz)   SpO2 98%   BMI 28.59 kg/m²     General Appearance:    Alert, cooperative, no distress, appears stated age   Head:    Normocephalic, without obvious abnormality, atraumatic   Eyes:    PERRL, conjunctivae/corneas clear, EOM's intact, both eyes   Ears:    Normal external ear canals, both ears   Nose:   Nares normal, septum midline, mucosa normal, no drainage    or sinus tenderness   Throat:   Lips, mucosa, and tongue normal   Neck:   Supple, symmetrical, trachea midline, no adenopathy;     thyroid:  no enlargement/tenderness/nodules; no carotid    bruit or JVD   Back:     Symmetric, no curvature, ROM normal, no CVA tenderness   Lungs:     Clear to auscultation bilaterally, respirations unlabored   Chest Wall:    No tenderness or deformity    Heart:    Regular rate and rhythm, S1 and S2 normal, no murmur, rub   or gallop   Abdomen:     Soft, nontender, bowel sounds active all four quadrants,     no masses, no hepatomegaly, no splenomegaly   Extremities:   Extremities normal, atraumatic, no cyanosis or edema   Pulses:   2+ and symmetric all extremities   Skin:   Skin color, texture, turgor normal, no rashes or lesions               .    Data Review:  Labs in chart were reviewed.  WBC   Date Value Ref Range Status   03/17/2023 5.26 3.40 - 10.80 10*3/mm3 Final     Hemoglobin   Date Value Ref Range Status   03/17/2023 12.2 12.0 - 15.9 g/dL Final     Hematocrit   Date Value Ref Range Status   03/17/2023 35.7 34.0 - 46.6 % Final     Platelets   Date Value Ref Range Status   03/17/2023 164 140 - 450 10*3/mm3 Final     Sodium   Date Value Ref Range Status   03/17/2023 142 136 - 145 mmol/L Final     Potassium   Date Value Ref Range Status   03/17/2023 4.0 3.5 - 5.2 mmol/L Final     Chloride   Date " Value Ref Range Status   03/17/2023 106 98 - 107 mmol/L Final     CO2   Date Value Ref Range Status   03/17/2023 24.3 22.0 - 29.0 mmol/L Final     BUN   Date Value Ref Range Status   03/17/2023 29 (H) 8 - 23 mg/dL Final     Creatinine   Date Value Ref Range Status   03/17/2023 0.81 0.57 - 1.00 mg/dL Final     Glucose   Date Value Ref Range Status   03/17/2023 93 65 - 99 mg/dL Final     Calcium   Date Value Ref Range Status   03/17/2023 9.7 8.6 - 10.5 mg/dL Final                Imaging Results (All)     Procedure Component Value Units Date/Time    CT Chest Without Contrast Diagnostic [[id number]] Collected: 03/17/23 1949     Updated: 03/17/23 1958    Narrative:      CT CHEST WITHOUT IV CONTRAST     HISTORY: Shortness of breath, chronic dyspnea     TECHNIQUE: Radiation dose reduction techniques were utilized, including  automated exposure control and exposure modulation based on body size.   3 mm images were obtained through the chest without IV contrast.      COMPARISON: None     FINDINGS:     Small-to-moderate pericardial effusion is present. Hypodense right renal  lesion demonstrating density less than 15 Hounsfield units is likely  benign per Bosniak 2019 criteria. There is a small hiatal hernia. No  mediastinal or axillary adenopathy is present by size criteria. At least  minimal to mild coronary artery desiccation is present.     Bibasilar atelectasis and or pleural peripheral scarring is present. No  pleural effusion or pneumothorax is present. Area of ill-defined  pulmonary and groundglass opacification is present within the right  upper lobe posteriorly in a somewhat nodular configuration measuring up  to 2.1 cm. A few smaller scattered sub-6 mm pulmonary nodules are  present. Please refer to saved key images in PACS for more precise  locations of index nodules..     No suspicious lytic blastic osseous lesions present.       Impression:      1.  There is a somewhat nodular area of groundglass and  pulmonary  opacification within the posterior aspect of the right upper lobe  measuring up to 2.1 cm. While findings may represent an area of  pneumonia, given the somewhat nodular appearance follow-up with chest CT  in 6-8 weeks is recommended to ensure appropriate evolution/resolution  and exclude possibility of neoplasm. A few scattered sub-6 mm pulmonary  nodules can be followed up at that time as well.  2.  Small-to-moderate pericardial effusion.  3.  Other findings as above.     This report was finalized on 3/17/2023 7:55 PM by Dr. Giancarlo Lim M.D.       XR Chest 1 View [[id number]] Collected: 03/17/23 1546     Updated: 03/17/23 1551    Narrative:      XR CHEST 1 VW-     HISTORY: Female who is 82 years-old,  short breath     TECHNIQUE: Frontal view of the chest     COMPARISON: 12/01/2014     FINDINGS: The heart size is borderline. Aorta is calcified. Pulmonary  vasculature is unremarkable. No focal pulmonary consolidation, pleural  effusion, or pneumothorax. ON granulomatous disease is seen. No acute  osseous process.       Impression:      No evidence for acute pulmonary process. Borderline heart  size. Follow-up as clinical indications persist.     This report was finalized on 3/17/2023 3:48 PM by Dr. Jayme Snyder M.D.               Assessment:  Active Hospital Problems    Diagnosis  POA   • **Exertional dyspnea [R06.09]  Yes      Resolved Hospital Problems   No resolved problems to display.   hypertension  Hyperlipidemia      Plan:  Will ask pulmonary to see her  Ct noted  Check echo to evaluate possible pericardial effusion  Monitor on telemetry  Dw. Patient and ED provider    Jennie Lantigua MD  3/17/2023  21:01 EDT

## 2023-03-18 NOTE — PLAN OF CARE
Goal Outcome Evaluation:  Plan of Care Reviewed With: patient     Pt admitted to floor from ED with exertional dyspnea. On RA. Dyspnea after ambulating to toilet. Stand by assist. A/o x4. Pulmonary consulted.     Progress: improving

## 2023-03-18 NOTE — CONSULTS
Date of Hospital Visit: 3/17/2023  Date of consult:   Encounter Provider: Ranulfo Kirby MD  Place of Service: [facility] CARDIOLOGY  Patient Name: Estela Joshua  :1940  Referral Provider: Jennie Lantigua, *    Chief complaint-shortness of breath    Reason for consult: Exertional shortness of breath.    History of Present Illness  Ms. Joshua is an 83 years old lady who came to the ER for evaluation of exertional shortness of breath.  At baseline she is fairly active doing different activities but for the past few weeks and months she has been increasingly getting tired and short of breath during activities and exertion interfering with her daily routines.  She denies exertional chest discomfort.  No orthopnea, PND, palpitations, presyncope or syncope or extremity swelling.         Past Medical History:   Diagnosis Date   • COPD (chronic obstructive pulmonary disease) (HCC)    • Depression    • GERD (gastroesophageal reflux disease)    • Hyperglycemia    • Hyperlipidemia    • Hypertension    • Macular degeneration    • Osteoarthritis        Past Surgical History:   Procedure Laterality Date   • APPENDECTOMY     • BREAST EXCISIONAL BIOPSY Right     ; benign   • CARDIAC CATHETERIZATION     • CARDIAC CATHETERIZATION N/A 2017    Procedure: Right and Left Heart Cath;  Surgeon: Pramod Salvador MD;  Location: [facility] CATH INVASIVE LOCATION;  Service:    • CARDIAC CATHETERIZATION N/A 2017    Procedure: Coronary angiography;  Surgeon: Pramod Salvador MD;  Location: [facility] CATH INVASIVE LOCATION;  Service:    • CARDIAC CATHETERIZATION N/A 2017    Procedure: Left ventriculography;  Surgeon: Pramod Salvador MD;  Location: [facility] CATH INVASIVE LOCATION;  Service:    • CATARACT EXTRACTION     • COLONOSCOPY N/A 2017    Procedure: COLONOSCOPY TO CECUM;  Surgeon: Siddhartha Mujica MD;  Location: [facility] ENDOSCOPY;  Service:    • ENDOSCOPY  N/A 9/1/2017    Procedure: ESOPHAGOGASTRODUODENOSCOPY WITH COLD BIOPSIES;  Surgeon: Siddhartha Mujica MD;  Location: [facility] ENDOSCOPY;  Service:    • LAPAROSCOPIC TUBAL LIGATION         Medications Prior to Admission   Medication Sig Dispense Refill Last Dose   • albuterol sulfate  (90 Base) MCG/ACT inhaler Inhale 2 puffs Every 6 (Six) Hours As Needed for Wheezing. 18 g 0 3/17/2023   • alendronate (Fosamax) 70 MG tablet Take 1 tablet by mouth Every 7 (Seven) Days. 12 tablet 3 3/16/2023   • amLODIPine (NORVASC) 5 MG tablet Take 1 tablet by mouth 2 (Two) Times a Day. 180 tablet 1 3/17/2023   • atorvastatin (LIPITOR) 40 MG tablet Take 1 tablet by mouth Every Night. 90 tablet 3 3/16/2023   • budesonide-formoterol (Symbicort) 80-4.5 MCG/ACT inhaler Inhale 2 puffs 2 (Two) Times a Day. 3 each 3 3/17/2023   • diclofenac (VOLTAREN) 75 MG EC tablet Take 1 tablet by mouth 2 (Two) Times a Day With Meals. 180 tablet 1 3/17/2023   • fluticasone (FLONASE) 50 MCG/ACT nasal spray Use 1 spray in each nostril morning and evening 16 g 5 3/17/2023   • furosemide (LASIX) 20 MG tablet Take 1 tablet by mouth Daily. 90 tablet 1 3/17/2023   • Glucosamine HCl (GLUCOSAMINE PO) Take 50 mg by mouth Daily.   3/17/2023   • Multiple Vitamins-Minerals (EYE VITAMINS PO) Take  by mouth.   3/17/2023   • pantoprazole (PROTONIX) 40 MG EC tablet Take 1 tablet by mouth Daily. 90 tablet 3 3/17/2023   • sertraline (ZOLOFT) 100 MG tablet Take 1 tablet by mouth Daily. 90 tablet 3 3/17/2023   • aspirin 81 MG EC tablet Take 1 tablet by mouth Daily.   More than a month   • BESIVANCE 0.6 % suspension ophthalmic suspension SHAKE LQ AND INT 1 GTT IN OD BID  1 More than a month       Current Meds  Scheduled Meds:amLODIPine, 5 mg, Oral, BID  aspirin, 81 mg, Oral, Daily  atorvastatin, 40 mg, Oral, Nightly  budesonide-formoterol, 2 puff, Inhalation, BID - RT  enoxaparin, 40 mg, Subcutaneous, Q24H  fluticasone, 1 spray, Each Nare, BID  furosemide, 20 mg, Oral,  "Daily  pantoprazole, 40 mg, Oral, Daily  sertraline, 100 mg, Oral, Daily      Continuous Infusions:   PRN Meds:.•  acetaminophen  •  albuterol  •  melatonin  •  ondansetron **OR** ondansetron  •  [COMPLETED] Insert Peripheral IV **AND** sodium chloride    Allergies as of 2023   • (No Known Allergies)       Social History     Socioeconomic History   • Marital status:    Tobacco Use   • Smoking status: Former     Packs/day: 1.00     Years: 20.00     Pack years: 20.00     Types: Cigarettes     Quit date:      Years since quittin.2   • Smokeless tobacco: Never   Substance and Sexual Activity   • Alcohol use: Yes     Alcohol/week: 2.0 standard drinks     Types: 1 Glasses of wine, 1 Cans of beer per week   • Drug use: No   • Sexual activity: Defer       Family History   Problem Relation Age of Onset   • Alzheimer's disease Mother    • Stroke Mother    • Heart failure Father        REVIEW OF SYSTEMS:   All systems reviewed and pertinent positives include in HPI otherwise negative review of systems.       Objective:   Temp:  [97.8 °F (36.6 °C)-98.6 °F (37 °C)] 98.6 °F (37 °C)  Heart Rate:  [50-96] 50  Resp:  [16-18] 18  BP: (123-159)/(51-66) 123/66  Body mass index is 28.35 kg/m².  Flowsheet Rows    Flowsheet Row First Filed Value   Admission Height 157.5 cm (62\") Documented at 2023   Admission Weight 70.9 kg (156 lb 4.9 oz) Documented at 2023        Vitals:    23 1111   BP: 123/66   Pulse: 50   Resp: 18   Temp: 98.6 °F (37 °C)   SpO2: 92%       General Appearance:    Alert, cooperative, in no acute distress   Head:    Normocephalic, without obvious abnormality, atraumatic   Eyes:            Lids and lashes normal, conjunctivae and sclerae normal, no   icterus, no pallor, corneas clear, PERRLA   Ears:    Ears appear intact with no abnormalities noted   Throat:   No oral lesions, no thrush, oral mucosa moist   Neck:   No adenopathy, supple, trachea midline, no thyromegaly, no "   carotid bruit, no JVD   Back:     No kyphosis present, no scoliosis present, no skin lesions, erythema or scars, no tenderness to percussion or palpation, range of motion normal   Lungs:     Clear to auscultation,respirations regular, even and unlabored    Heart:    Regular rhythm and normal rate, normal S1 and S2, no murmur, no gallop, no rub, no click   Chest Wall:    No abnormalities observed   Abdomen:     Normal bowel sounds, no masses, no organomegaly, soft nontender, nondistended, no guarding, no rebound  tenderness   Extremities:   Moves all extremities well, no edema, no cyanosis, no redness   Pulses:   Pulses palpable and equal bilaterally. Normal radial, carotid, femoral, dorsalis pedis and posterior tibial pulses bilaterally. Normal abdominal aorta   Skin:  Neurology:   Psychiatric:   No bleeding, bruising or rash   Normal speech and cranial nerve exam, no focal deficit   Alert and oriented x 3, normal mood and affect                 Review of Data:      Results from last 7 days   Lab Units 03/18/23  0547 03/17/23  1521   SODIUM mmol/L 142 142   POTASSIUM mmol/L 4.2 4.0   CHLORIDE mmol/L 110* 106   CO2 mmol/L 22.7 24.3   BUN mg/dL 20 29*   CREATININE mg/dL 0.58 0.81   CALCIUM mg/dL 9.4 9.7   BILIRUBIN mg/dL  --  0.2   ALK PHOS U/L  --  29*   ALT (SGPT) U/L  --  25   AST (SGOT) U/L  --  21   GLUCOSE mg/dL 118* 93     Results from last 7 days   Lab Units 03/17/23  1757 03/17/23  1521   HSTROP T ng/L 12* 10*     @LABRCNTbnp@  Results from last 7 days   Lab Units 03/18/23  0547 03/17/23  1521   WBC 10*3/mm3 5.40 5.26   HEMOGLOBIN g/dL 11.8* 12.2   HEMATOCRIT % 35.9 35.7   PLATELETS 10*3/mm3 183 164             @LABRCNTIP(chol,trig,hdl,ldl)    I personally viewed and interpreted the patient's EKG/Telemetry data  )  Patient Active Problem List   Diagnosis   • Depression   • Hyperlipidemia   • Hypertension   • Visual disturbance   • Vitamin D deficiency   • Imbalance   • Impacted cerumen of right ear   •  Shortness of breath on exertion   • Edema   • Restrictive lung disease   • Arthralgia   • GERD (gastroesophageal reflux disease)   • Arthritis of left knee   • Arthritis of knee, right   • Chronic bilateral low back pain without sciatica   • Episodic lightheadedness   • Allergic rhinitis   • Skin lesion   • Osteopenia   • Exertional dyspnea   • Lung nodule   • Pericardial effusion         Assessment and Plan:    Exertional shortness of breath-no evidence for overt heart failure or obvious pulmonary disease.  Echocardiogram with preserved left ventricular ejection fraction, grade 2 diastolic dysfunction, mild pulmonary hypertension, mild MR/TR.  Coronary angiogram in 2017 reportedly normal.  Absent right coronary artery  Coronary artery calcification of LAD noted on CT chest  Unremarkable EKG.     Do myocardial perfusion study to rule out any significant coronary artery disease contributing to symptoms.      Ranulfo Kirby MD  03/18/23  12:27 EDT.  Time spent in reviewing chart, discussion and examination:

## 2023-03-18 NOTE — PROGRESS NOTES
Name: Estela Joshua ADMIT: 3/17/2023   : 1940  PCP: Robert Miner MD    MRN: [id number] LOS: 0 days   AGE/SEX: 82 y.o. female  ROOM: [facility]     Subjective   Subjective   Chief Complaint   Patient presents with   • Shortness of Breath     Shortness of breath with exertion but not at rest.  She also has some dyspnea with prolonged speaking.  Overall she does feel improved compared to yesterday.  No chest pain.  No wheezing or productive cough.  Afebrile.  No nausea vomiting diarrhea.     Objective   Objective   Vital Signs  Temp:  [97.8 °F (36.6 °C)-98.6 °F (37 °C)] 98.6 °F (37 °C)  Heart Rate:  [53-96] 56  Resp:  [16-18] 18  BP: (123-159)/(51-66) 123/66  SpO2:  [91 %-100 %] 97 %  on   ;   Device (Oxygen Therapy): room air  Body mass index is 28.35 kg/m².    Physical Exam  Vitals and nursing note reviewed.   Constitutional:       General: She is not in acute distress.     Appearance: She is not diaphoretic.   HENT:      Head: Normocephalic and atraumatic.   Eyes:      General:         Right eye: No discharge.         Left eye: No discharge.      Conjunctiva/sclera: Conjunctivae normal.   Cardiovascular:      Rate and Rhythm: Normal rate and regular rhythm.      Pulses: Normal pulses.   Pulmonary:      Effort: Pulmonary effort is normal.      Breath sounds: No wheezing.   Abdominal:      General: There is no distension.      Palpations: Abdomen is soft.      Tenderness: There is no abdominal tenderness. There is no guarding or rebound.   Musculoskeletal:         General: No swelling or tenderness.      Cervical back: Neck supple. No tenderness.   Skin:     General: Skin is warm and dry.   Neurological:      Mental Status: She is alert.      Cranial Nerves: No cranial nerve deficit.   Psychiatric:         Mood and Affect: Mood normal.         Behavior: Behavior normal.       Results Review  I reviewed the patient's new clinical results.    Results from last 7 days   Lab Units 23  4649  03/17/23  1521   WBC 10*3/mm3 5.40 5.26   HEMOGLOBIN g/dL 11.8* 12.2   PLATELETS 10*3/mm3 183 164     Results from last 7 days   Lab Units 03/18/23  0547 03/17/23  1521   SODIUM mmol/L 142 142   POTASSIUM mmol/L 4.2 4.0   CHLORIDE mmol/L 110* 106   CO2 mmol/L 22.7 24.3   BUN mg/dL 20 29*   CREATININE mg/dL 0.58 0.81   GLUCOSE mg/dL 118* 93   EGFR mL/min/1.73 90.5 72.6     Results from last 7 days   Lab Units 03/17/23  1521   ALBUMIN g/dL 4.1   BILIRUBIN mg/dL 0.2   ALK PHOS U/L 29*   AST (SGOT) U/L 21   ALT (SGPT) U/L 25     Results from last 7 days   Lab Units 03/18/23  0547 03/17/23  1521   CALCIUM mg/dL 9.4 9.7   ALBUMIN g/dL  --  4.1         No results found for: HGBA1C, POCGLU    CT Chest Without Contrast Diagnostic    Result Date: 3/17/2023  1.  There is a somewhat nodular area of groundglass and pulmonary opacification within the posterior aspect of the right upper lobe measuring up to 2.1 cm. While findings may represent an area of pneumonia, given the somewhat nodular appearance follow-up with chest CT in 6-8 weeks is recommended to ensure appropriate evolution/resolution and exclude possibility of neoplasm. A few scattered sub-6 mm pulmonary nodules can be followed up at that time as well. 2.  Small-to-moderate pericardial effusion. 3.  Other findings as above.  This report was finalized on 3/17/2023 7:55 PM by Dr. Giancarlo Lim M.D.      XR Chest 1 View    Result Date: 3/17/2023  No evidence for acute pulmonary process. Borderline heart size. Follow-up as clinical indications persist.  This report was finalized on 3/17/2023 3:48 PM by Dr. Jayme Snyder M.D.        I have personally reviewed all medications:  Scheduled Medications  amLODIPine, 5 mg, Oral, BID  aspirin, 81 mg, Oral, Daily  atorvastatin, 40 mg, Oral, Nightly  budesonide-formoterol, 2 puff, Inhalation, BID - RT  fluticasone, 1 spray, Each Nare, BID  furosemide, 20 mg, Oral, Daily  naproxen, 500 mg, Oral, BID With Meals  pantoprazole, 40  mg, Oral, Daily  sertraline, 100 mg, Oral, Daily      Infusions     Diet  Diet: Cardiac Diets; Healthy Heart (2-3 Na+); Texture: Regular Texture (IDDSI 7); Fluid Consistency: Thin (IDDSI 0)    I have personally reviewed:  [x]  Laboratory   []  Microbiology   [x]  Radiology   [x]  EKG/Telemetry  []  Cardiology/Vascular   []  Pathology    [x]  Records       Assessment/Plan     Active Hospital Problems    Diagnosis  POA   • **Exertional dyspnea [R06.09]  Yes   • Lung nodule [R91.1]  Yes   • Pericardial effusion [I31.39]  Yes   • GERD (gastroesophageal reflux disease) [K21.9]  Yes   • Hypertension [I10]  Yes      Resolved Hospital Problems   No resolved problems to display.       82 y.o. female admitted with Exertional dyspnea.    • Social dyspnea: She reports some improvement overnight.  She is continuing breathing treatments and home Lasix.  Pulmonology and cardiology consulting.  • Lung nodule: Repeat CT as outpatient for monitoring  • Pericardial effusion: Echo ordered  • Hypertension: Acceptable acutely.  Continue current regimen.  • GERD: PPI.  Going to hold on naproxen unless this is needed from a cardiac standpoint  • Ppx: Lovenox  • Disposition: TBD    Expected discharge phrase not functional until after CCP evaluation    Emmanuel Camara MD  [facility]  03/18/23  11:33 EDT

## 2023-03-18 NOTE — PROGRESS NOTES
[facility] INPATIENT PROGRESS NOTE         [address]    3/18/2023      PATIENT IDENTIFICATION:  Name: Estela Joshua ADMIT: 3/17/2023   : 1940  PCP: Robert Miner MD    MRN: [id number] LOS: 0 days   AGE/SEX: 82 y.o. female  ROOM: [facility]                     LOS 0    Reason for visit: Shortness of breath      SUBJECTIVE:      Complains of shortness of breath with exertion.  No dyspnea at rest.  Denies wheezing or productive cough.  Saturations 99% on room air.  I am seeing the patient for the first time today.  All patient problems are new to me.      Objective   OBJECTIVE:    Vital Sign Min/Max for last 24 hours  Temp  Min: 97.8 °F (36.6 °C)  Max: 98.5 °F (36.9 °C)   BP  Min: 124/61  Max: 159/60   Pulse  Min: 53  Max: 96   Resp  Min: 16  Max: 18   SpO2  Min: 91 %  Max: 100 %   No data recorded   Weight  Min: 70.3 kg (154 lb 15.7 oz)  Max: 70.9 kg (156 lb 4.9 oz)                         Body mass index is 28.35 kg/m².    Intake/Output Summary (Last 24 hours) at 3/18/2023 0934  Last data filed at 3/18/2023 0823  Gross per 24 hour   Intake 360 ml   Output --   Net 360 ml         Exam:  GEN:  No distress, appears stated age  EYES:   PERRL, anicteric sclerae  ENT:    External ears/nose normal, OP clear  NECK:  No adenopathy, midline trachea  LUNGS: Normal chest on inspection, palpation and auscultation  CV:  Normal S1S2, without murmur  ABD:  Nontender, nondistended, no hepatosplenomegaly, +BS  EXT:  No edema.  No cyanosis or clubbing.  No mottling and normal cap refill.    Assessment     Scheduled meds:  amLODIPine, 5 mg, Oral, BID  aspirin, 81 mg, Oral, Daily  atorvastatin, 40 mg, Oral, Nightly  budesonide-formoterol, 2 puff, Inhalation, BID - RT  fluticasone, 1 spray, Each Nare, BID  furosemide, 20 mg, Oral, Daily  naproxen, 500 mg, Oral, BID With Meals  pantoprazole, 40 mg, Oral, Daily  sertraline, 100 mg, Oral, Daily      IV meds:                         Data Review:  Results from last  7 days   Lab Units 03/18/23  0547 03/17/23  1521   SODIUM mmol/L 142 142   POTASSIUM mmol/L 4.2 4.0   CHLORIDE mmol/L 110* 106   CO2 mmol/L 22.7 24.3   BUN mg/dL 20 29*   CREATININE mg/dL 0.58 0.81   GLUCOSE mg/dL 118* 93   CALCIUM mg/dL 9.4 9.7         Estimated Creatinine Clearance: 68.7 mL/min (by C-G formula based on SCr of 0.58 mg/dL).  Results from last 7 days   Lab Units 03/18/23  0547 03/17/23  1521   WBC 10*3/mm3 5.40 5.26   HEMOGLOBIN g/dL 11.8* 12.2   PLATELETS 10*3/mm3 183 164         Results from last 7 days   Lab Units 03/17/23  1521   ALT (SGPT) U/L 25   AST (SGOT) U/L 21                 No results found for: HGBA1C, POCGLU    CT chest 3/17 reviewed: Groundglass pulmonary opacification right upper lobe at 2.1 cm and a few scattered sub-6 mm pulmonary nodules.              Active Hospital Problems    Diagnosis  POA   • **Exertional dyspnea [R06.09]  Yes      Resolved Hospital Problems   No resolved problems to display.         ASSESSMENT:    1. Dyspnea on exertion  2. Stable angina  3. Small to moderate pericardial effusion on CT chest from 3/17/2023  4. Moderate mitral regurgitation on ECHO from 1/10/17  5. Scattered pulmonary nodules on CT chest from 3/17/2023  6. Hyperlipidemia  7. Hypertension  8. Pedal edema  9. GERD  10. Obesity, BMI 28.59  11. Former tobacco abuse, total 30 pack years        PLAN:    Unclear etiology of her dyspnea with exertion.  No signs of clinical infection and no wheezing.  Saturations on room air at rest 99%.  Continue Symbicort for potential obstructive lung disease.  Plan repeat CT chest as outpatient for follow-up of nodularity in 3 months.  2D echo pending and cardiology consult.        Zachery Glez MD  Pulmonary and Critical Care Medicine  [facility], [facility]  3/18/2023    09:34 EDT

## 2023-03-19 ENCOUNTER — APPOINTMENT (OUTPATIENT)
Dept: NUCLEAR MEDICINE | Facility: HOSPITAL | Age: 83
End: 2023-03-19
Payer: MEDICARE

## 2023-03-19 ENCOUNTER — READMISSION MANAGEMENT (OUTPATIENT)
Dept: CALL CENTER | Facility: HOSPITAL | Age: 83
End: 2023-03-19
Payer: MEDICARE

## 2023-03-19 VITALS
OXYGEN SATURATION: 97 % | HEART RATE: 71 BPM | SYSTOLIC BLOOD PRESSURE: 100 MMHG | WEIGHT: 152.34 LBS | RESPIRATION RATE: 18 BRPM | HEIGHT: 62 IN | TEMPERATURE: 98.6 F | BODY MASS INDEX: 28.03 KG/M2 | DIASTOLIC BLOOD PRESSURE: 59 MMHG

## 2023-03-19 LAB
ANION GAP SERPL CALCULATED.3IONS-SCNC: 10 MMOL/L (ref 5–15)
BH CV REST NUCLEAR ISOTOPE DOSE: 11.8 MCI
BH CV STRESS COMMENTS STAGE 1: NORMAL
BH CV STRESS DOSE REGADENOSON STAGE 1: 0.4
BH CV STRESS DURATION MIN STAGE 1: 0
BH CV STRESS DURATION SEC STAGE 1: 10
BH CV STRESS NUCLEAR ISOTOPE DOSE: 35 MCI
BH CV STRESS PROTOCOL 1: NORMAL
BH CV STRESS RECOVERY BP: NORMAL MMHG
BH CV STRESS RECOVERY HR: 59 BPM
BH CV STRESS STAGE 1: 1
BUN SERPL-MCNC: 22 MG/DL (ref 8–23)
BUN/CREAT SERPL: 31.4 (ref 7–25)
CALCIUM SPEC-SCNC: 8.9 MG/DL (ref 8.6–10.5)
CHLORIDE SERPL-SCNC: 107 MMOL/L (ref 98–107)
CO2 SERPL-SCNC: 26 MMOL/L (ref 22–29)
CREAT SERPL-MCNC: 0.7 MG/DL (ref 0.57–1)
DEPRECATED RDW RBC AUTO: 44 FL (ref 37–54)
EGFRCR SERPLBLD CKD-EPI 2021: 86.5 ML/MIN/1.73
ERYTHROCYTE [DISTWIDTH] IN BLOOD BY AUTOMATED COUNT: 13 % (ref 12.3–15.4)
GLUCOSE SERPL-MCNC: 97 MG/DL (ref 65–99)
HCT VFR BLD AUTO: 34.7 % (ref 34–46.6)
HGB BLD-MCNC: 11.5 G/DL (ref 12–15.9)
LV EF NUC BP: 76 %
MAXIMAL PREDICTED HEART RATE: 138 BPM
MCH RBC QN AUTO: 30.7 PG (ref 26.6–33)
MCHC RBC AUTO-ENTMCNC: 33.1 G/DL (ref 31.5–35.7)
MCV RBC AUTO: 92.5 FL (ref 79–97)
PERCENT MAX PREDICTED HR: 47.83 %
PLATELET # BLD AUTO: 163 10*3/MM3 (ref 140–450)
PMV BLD AUTO: 9.9 FL (ref 6–12)
POTASSIUM SERPL-SCNC: 3.8 MMOL/L (ref 3.5–5.2)
RBC # BLD AUTO: 3.75 10*6/MM3 (ref 3.77–5.28)
SODIUM SERPL-SCNC: 143 MMOL/L (ref 136–145)
STRESS BASELINE BP: NORMAL MMHG
STRESS BASELINE HR: 45 BPM
STRESS PERCENT HR: 56 %
STRESS POST PEAK BP: NORMAL MMHG
STRESS POST PEAK HR: 66 BPM
STRESS TARGET HR: 117 BPM
WBC NRBC COR # BLD: 6.69 10*3/MM3 (ref 3.4–10.8)

## 2023-03-19 PROCEDURE — 78452 HT MUSCLE IMAGE SPECT MULT: CPT | Performed by: INTERNAL MEDICINE

## 2023-03-19 PROCEDURE — 93017 CV STRESS TEST TRACING ONLY: CPT

## 2023-03-19 PROCEDURE — A9500 TC99M SESTAMIBI: HCPCS | Performed by: HOSPITALIST

## 2023-03-19 PROCEDURE — 94618 PULMONARY STRESS TESTING: CPT

## 2023-03-19 PROCEDURE — 85027 COMPLETE CBC AUTOMATED: CPT | Performed by: INTERNAL MEDICINE

## 2023-03-19 PROCEDURE — 96375 TX/PRO/DX INJ NEW DRUG ADDON: CPT

## 2023-03-19 PROCEDURE — 80048 BASIC METABOLIC PNL TOTAL CA: CPT | Performed by: INTERNAL MEDICINE

## 2023-03-19 PROCEDURE — 0 TECHNETIUM SESTAMIBI: Performed by: HOSPITALIST

## 2023-03-19 PROCEDURE — 78452 HT MUSCLE IMAGE SPECT MULT: CPT

## 2023-03-19 PROCEDURE — G0378 HOSPITAL OBSERVATION PER HR: HCPCS

## 2023-03-19 PROCEDURE — 93016 CV STRESS TEST SUPVJ ONLY: CPT | Performed by: INTERNAL MEDICINE

## 2023-03-19 PROCEDURE — 25010000002 REGADENOSON 0.4 MG/5ML SOLUTION: Performed by: HOSPITALIST

## 2023-03-19 PROCEDURE — 93018 CV STRESS TEST I&R ONLY: CPT | Performed by: INTERNAL MEDICINE

## 2023-03-19 PROCEDURE — 99232 SBSQ HOSP IP/OBS MODERATE 35: CPT | Performed by: INTERNAL MEDICINE

## 2023-03-19 RX ORDER — AMLODIPINE BESYLATE 5 MG/1
5 TABLET ORAL
Status: DISCONTINUED | OUTPATIENT
Start: 2023-03-20 | End: 2023-03-19 | Stop reason: HOSPADM

## 2023-03-19 RX ORDER — SPIRONOLACTONE 25 MG/1
25 TABLET ORAL DAILY
Qty: 30 TABLET | Refills: 0 | Status: SHIPPED | OUTPATIENT
Start: 2023-03-19

## 2023-03-19 RX ORDER — SPIRONOLACTONE 25 MG/1
25 TABLET ORAL DAILY
Status: DISCONTINUED | OUTPATIENT
Start: 2023-03-19 | End: 2023-03-19 | Stop reason: HOSPADM

## 2023-03-19 RX ADMIN — FLUTICASONE PROPIONATE 1 SPRAY: 50 SPRAY, METERED NASAL at 11:55

## 2023-03-19 RX ADMIN — SPIRONOLACTONE 25 MG: 25 TABLET ORAL at 17:35

## 2023-03-19 RX ADMIN — SERTRALINE 100 MG: 100 TABLET, FILM COATED ORAL at 11:56

## 2023-03-19 RX ADMIN — TECHNETIUM TC 99M SESTAMIBI 1 DOSE: 1 INJECTION INTRAVENOUS at 09:30

## 2023-03-19 RX ADMIN — AMLODIPINE BESYLATE 5 MG: 5 TABLET ORAL at 11:56

## 2023-03-19 RX ADMIN — REGADENOSON 0.4 MG: 0.08 INJECTION, SOLUTION INTRAVENOUS at 09:30

## 2023-03-19 RX ADMIN — TECHNETIUM TC 99M SESTAMIBI 1 DOSE: 1 INJECTION INTRAVENOUS at 08:00

## 2023-03-19 RX ADMIN — FUROSEMIDE 20 MG: 20 TABLET ORAL at 11:56

## 2023-03-19 RX ADMIN — ASPIRIN 81 MG: 81 TABLET, COATED ORAL at 11:56

## 2023-03-19 RX ADMIN — PANTOPRAZOLE SODIUM 40 MG: 40 TABLET, DELAYED RELEASE ORAL at 11:56

## 2023-03-19 NOTE — PROGRESS NOTES
"DAILY PROGRESS NOTE  [facility]    Patient Identification:  Name: Estela Joshua  Age: 82 y.o.  Sex: female  :  1940  MRN: [id number]         Primary Care Physician: Robert Miner MD      Subjective  Patient with no new or acute complaints.    Objective:  General Appearance:  Comfortable, well-appearing, in no acute distress and not in pain.    Vital signs: (most recent): Blood pressure 96/59, pulse 60, temperature 98.6 °F (37 °C), temperature source Oral, resp. rate 18, height 157.5 cm (62\"), weight 69.1 kg (152 lb 5.4 oz), SpO2 96 %, not currently breastfeeding.    Lungs:  Normal effort and normal respiratory rate.  Breath sounds clear to auscultation.    Heart: Normal rate.  Regular rhythm.    Extremities: There is no dependent edema.    Neurological: Patient is alert and oriented to person, place and time.    Skin:  Warm and dry.                Vital signs in last 24 hours:  Temp:  [97.9 °F (36.6 °C)-98.6 °F (37 °C)] 98.6 °F (37 °C)  Heart Rate:  [47-69] 60  Resp:  [18] 18  BP: ()/(59-75) 96/59    Intake/Output:    Intake/Output Summary (Last 24 hours) at 3/19/2023 1638  Last data filed at 3/18/2023 2200  Gross per 24 hour   Intake 600 ml   Output --   Net 600 ml         Results from last 7 days   Lab Units 23  0342 23  0547 23  1521   WBC 10*3/mm3 6.69 5.40 5.26   HEMOGLOBIN g/dL 11.5* 11.8* 12.2   PLATELETS 10*3/mm3 163 183 164     Results from last 7 days   Lab Units 23  0342 23  0547 23  1521   SODIUM mmol/L 143 142 142   POTASSIUM mmol/L 3.8 4.2 4.0   CHLORIDE mmol/L 107 110* 106   CO2 mmol/L 26.0 22.7 24.3   BUN mg/dL 22 20 29*   CREATININE mg/dL 0.70 0.58 0.81   GLUCOSE mg/dL 97 118* 93   Estimated Creatinine Clearance: 56.4 mL/min (by C-G formula based on SCr of 0.7 mg/dL).  Results from last 7 days   Lab Units 23  0342 23  0547 23  1521   CALCIUM mg/dL 8.9 9.4 9.7   ALBUMIN g/dL  --   --  4.1     Results from last  " days   Lab Units 03/17/23  1521   ALBUMIN g/dL 4.1   BILIRUBIN mg/dL 0.2   ALK PHOS U/L 29*   AST (SGOT) U/L 21   ALT (SGPT) U/L 25       Assessment:    Exertional dyspnea    Hypertension    GERD (gastroesophageal reflux disease)    Lung nodule    Pericardial effusion    82 y.o. female admitted with Exertional dyspnea.     • Dyspnea on exertion:   Pulmonology and cardiology input appreciated.  Benign work-up.  Awaiting walking oximetry.  • Lung nodule: Repeat CT as outpatient for monitoring  • No pericardial effusion on echo.    • Hypertension: Acceptable acutely.  Continue current regimen.  • GERD: PPI.  Going to hold on naproxen unless this is needed from a cardiac standpoint  • Ppx: Lovenox  • Disposition: TBD    Plan:  Okay for discharge.  I do need to see a walking oximetry first however  Discussed with patient and her daughter at bedside.  Discussed with PHAM Mejias MD  3/19/2023  16:38 EDT

## 2023-03-19 NOTE — OUTREACH NOTE
Prep Survey    Flowsheet Row Responses   [facility] patient discharged from? [place]   Is LACE score < 7 ? No   Eligibility [facility]   Date of Admission 03/17/23   Date of Discharge 03/19/23   Discharge Disposition Home or Self Care   Discharge diagnosis Exertional dyspnea   Does the patient have one of the following disease processes/diagnoses(primary or secondary)? Other   Does the patient have Home health ordered? No   Is there a DME ordered? No   Prep survey completed? Yes          Marlyn FLORES - Registered Nurse

## 2023-03-19 NOTE — PROGRESS NOTES
Exercise Oximetry    Patient Name:Estela Joshua   MRN: [id number]   Date: 03/19/23             ROOM AIR BASELINE   SpO2% 97   Heart Rate 66   Blood Pressure      EXERCISE ON ROOM AIR SpO2% EXERCISE ON O2 @  LPM SpO2%   1 MINUTE 97 1 MINUTE    2 MINUTES 96 2 MINUTES    3 MINUTES 95 3 MINUTES    4 MINUTES 97 4 MINUTES    5 MINUTES 98 5 MINUTES    6 MINUTES 97 6 MINUTES               Distance Walked  Distance Walked   Dyspnea (Reji Scale)  Dyspnea (Reji Scale)   Fatigue (Reji Scale)  Fatigue (Reji Scale)   SpO2% Post Exercise  97 SpO2% Post Exercise   HR Post Exercise   HR Post Exercise   Time to Recovery   Time to Recovery     Comments:

## 2023-03-19 NOTE — PLAN OF CARE
Goal Outcome Evaluation:  Plan of Care Reviewed With: patient     VSS. RA. SOA with exertion. Up to toilet stand by assist. NPO since MD for stress test today. Resting comfortably.      Progress: improving

## 2023-03-19 NOTE — PROGRESS NOTES
"CC: Shortness of breath    Interval History: No new acute events overnight      Vital Signs  Temp:  [97.9 °F (36.6 °C)-98.6 °F (37 °C)] 98.6 °F (37 °C)  Heart Rate:  [47-69] 54  Resp:  [18] 18  BP: (122-144)/(58-75) 134/67    Intake/Output Summary (Last 24 hours) at 3/19/2023 1458  Last data filed at 3/18/2023 2200  Gross per 24 hour   Intake 600 ml   Output --   Net 600 ml     Flowsheet Rows    Flowsheet Row First Filed Value   Admission Height 157.5 cm (62\") Documented at 03/17/2023 1913   Admission Weight 70.9 kg (156 lb 4.9 oz) Documented at 03/17/2023 1913          PHYSICAL EXAM:  General: No acute distress  Resp:NL Rate, symmetric chest expansion,unlabored, clear  CV:NL rate and rhythm, NL PMI, NL S1 and S2, no Murmur, no gallop, no rub, No JVD.   ABD:Nl sounds, no masses or tenderness, nondistended, no guarding or rebound  Neuro: alert,cooperative and oriented  Extr:Normal pedal pulses, No edema or cyanosis, moves all extremities      Results Review:    Results from last 7 days   Lab Units 03/19/23  0342   SODIUM mmol/L 143   POTASSIUM mmol/L 3.8   CHLORIDE mmol/L 107   CO2 mmol/L 26.0   BUN mg/dL 22   CREATININE mg/dL 0.70   GLUCOSE mg/dL 97   CALCIUM mg/dL 8.9     Results from last 7 days   Lab Units 03/17/23  1757 03/17/23  1521   HSTROP T ng/L 12* 10*     Results from last 7 days   Lab Units 03/19/23  0342   WBC 10*3/mm3 6.69   HEMOGLOBIN g/dL 11.5*   HEMATOCRIT % 34.7   PLATELETS 10*3/mm3 163                     I reviewed the patient's new clinical results.  I personally viewed and interpreted the patient's EKG/Telemetry data        Medication Review:   Meds reviewed         Assessment/Plan     Exertional shortness of breath-no evidence for overt heart failure or obvious pulmonary disease.  Echocardiogram with preserved left ventricular ejection fraction, grade 2 diastolic dysfunction, mild pulmonary hypertension, mild MR/TR, moderately dilated left atrial cavity.  Coronary angiogram in 2017 reportedly " normal.  Absent right coronary artery  Coronary artery calcification of LAD noted on CT chest  Unremarkable EKG.      She had a normal myocardial perfusion study today.     She reports feeling better with Lasix.    Continue daily Lasix 20 mg p.o. daily.  I have added Aldactone 25 mg p.o. daily.  She will start to engage in regular walking with gradual buildup of duration of walking.   Outpatient sleep study.  Further recommendations per pulmonary    I will see her in office in 1 months.    Okay to DC patient home from cardiology  [place].    Thank you for consulting with cardiology    Ranulfo Kirby MD  03/19/23  14:58 EDT

## 2023-03-19 NOTE — DISCHARGE SUMMARY
PHYSICIAN DISCHARGE SUMMARY                                                                        [facility]    Patient Identification:  Name: Estela Joshua  Age: 82 y.o.  Sex: female  :  1940  MRN: [id number]  Primary Care Physician: Robert Miner MD    Admit date: 3/17/2023  Discharge date and time: 3/19/2023     Discharged Condition: good    Discharge Diagnoses:  Exertional dyspnea    Hypertension    GERD (gastroesophageal reflux disease)    Lung nodule    Pericardial effusion     Dyspnea exertion: Benign work-up.  Please see below.  Lung nodules: Follow-up with pulmonology.  Hypertension:  GERD:    Hospital Course:  Pleasant 82-year-old female presenting complaining of shortness of breath with exertion.  Please H&P for full details.  She was admitted and seen by both cardiology and pulmonology.  Room air O2 sats stayed within normal limits throughout the hospitalization.  She did not appear short of air.  Auscultation exam was normal.  Chest x-ray is unremarkable.  CT scan did show small pulmonary nodules in the small area of groundglass appearance.  No clinical evidence of underlying pneumonia.  There is a concern of possible pericardial effusion on CT scan.  Echocardiogram however did not show any evidence of effusion.  Ejection fraction was 68.5%.  There was grade 2 diastolic dysfunction.  Mild mitral valve regurgitation and mild aortic valve regurgitation.  She underwent stress Cardiolite study which also was unremarkable.  We perform walking oximetry today.  Room air O2 saturation was 97% at rest and at 6 minutes it was still 97%.  The patient is medically stable and can be discharged remainder of her treatment follow-up as an outpatient.  Pulmonology will follow her up with a repeat CT scan.  Cardiology added low-dose Aldactone to her regime we will follow her up in a month also.  Etiology of dyspnea on exertion  "is not shown in the work-up.  Deconditioning may very likely be playing a significant role.        Consults:     Consults     Date and Time Order Name Status Description    3/18/2023  6:11 AM Inpatient Cardiology Consult      3/17/2023  9:01 PM Inpatient Pulmonology Consult Completed     3/17/2023  5:03 PM [facility] (on-call MD unless specified) Details              Discharge Exam:  General Appearance:  Comfortable, well-appearing, in no acute distress and not in pain.    Vital signs: (most recent): Blood pressure 96/59, pulse 60, temperature 98.6 °F (37 °C), temperature source Oral, resp. rate 18, height 157.5 cm (62\"), weight 69.1 kg (152 lb 5.4 oz), SpO2 96 %, not currently breastfeeding.    Lungs:  Normal effort and normal respiratory rate.  Breath sounds clear to auscultation.    Heart: Normal rate.  Regular rhythm.    Extremities: There is no dependent edema.    Neurological: Patient is alert and oriented to person, place and time.    Skin:  Warm and dry.       Disposition:  Home    Patient Instructions:      Discharge Medications      New Medications      Instructions Start Date   spironolactone 25 MG tablet  Commonly known as: ALDACTONE   25 mg, Oral, Daily         Continue These Medications      Instructions Start Date   albuterol sulfate  (90 Base) MCG/ACT inhaler  Commonly known as: PROVENTIL HFA;VENTOLIN HFA;PROAIR HFA   2 puffs, Inhalation, Every 6 Hours PRN      alendronate 70 MG tablet  Commonly known as: Fosamax   70 mg, Oral, Every 7 Days      amLODIPine 5 MG tablet  Commonly known as: NORVASC   5 mg, Oral, 2 Times Daily      aspirin 81 MG EC tablet   81 mg, Oral, Daily      atorvastatin 40 MG tablet  Commonly known as: LIPITOR   40 mg, Oral, Nightly      Besivance 0.6 % suspension ophthalmic suspension  Generic drug: besifloxacin   SHAKE LQ AND INT 1 GTT IN OD BID      budesonide-formoterol 80-4.5 MCG/ACT inhaler  Commonly known as: Symbicort   2 puffs, Inhalation, 2 Times Daily - RT    "   diclofenac 75 MG EC tablet  Commonly known as: VOLTAREN   75 mg, Oral, 2 Times Daily With Meals      EYE VITAMINS PO   Oral      fluticasone 50 MCG/ACT nasal spray  Commonly known as: FLONASE   Use 1 spray in each nostril morning and evening      furosemide 20 MG tablet  Commonly known as: LASIX   20 mg, Oral, Daily      GLUCOSAMINE PO   50 mg, Oral, Daily      pantoprazole 40 MG EC tablet  Commonly known as: PROTONIX   40 mg, Oral, Daily      sertraline 100 MG tablet  Commonly known as: ZOLOFT   100 mg, Oral, Daily           Diet Instructions     Diet: Regular/House Diet; Texture: Regular Texture (IDDSI 7); Fluid Consistency: Thin (IDDSI 0)      Discharge Diet: Regular/House Diet    Texture: Regular Texture (IDDSI 7)    Fluid Consistency: Thin (IDDSI 0)        Future Appointments   Date Time Provider Department Center   4/6/2023  9:00 AM [facility]  [facility] [place]   6/14/2023  9:00 AM [facility]   6/20/2023  9:00 AM Robert Miner MD [facility]     Additional Instructions for the Follow-ups that You Need to Schedule     Discharge Follow-up with PCP   As directed       Currently Documented PCP:    Robert Miner MD    PCP Phone Number:    [phone]     Follow Up Details: 1 week         Discharge Follow-up with Specialty: Cardiology.  Pulmonology.   As directed      Specialty: Cardiology.  Pulmonology.    Follow Up Details: As directed            Follow-up Information     Robert Miner MD .    Specialty: Internal Medicine  Why: 1 week  Contact information:  [id number] [place] [address]  [phone]                       Discharge Order (From admission, onward)     Start     Ordered    03/19/23 1651  Discharge patient  Once        Expected Discharge Date: 03/19/23    Discharge Disposition: Home or Self Care    Physician of Record for Attribution - Please select from Treatment Team: TY NARVAEZ [[id number]]    Review needed by CMO to determine  Physician of Record: No       Question Answer Comment   Physician of Record for Attribution - Please select from Treatment Team KIKE MEJIAS    Review needed by CMO to determine Physician of Record No        03/19/23 5650                  Total time spent discharging patient including evaluation,post hospitalization follow up,  medication and post hospitalization instructions and education total time exceeds 30 minutes.    Signed:  Kike Mejias MD  3/19/2023  16:55 EDT    EMR Dragon/Transcription disclaimer:   Much of this encounter note is an electronic transcription/translation of spoken language to printed text. The electronic translation of spoken language may permit erroneous, or at times, nonsensical words or phrases to be inadvertently transcribed; Although I have reviewed the note for such errors, some may still exist.

## 2023-03-19 NOTE — PROGRESS NOTES
[facility] INPATIENT PROGRESS NOTE         [address]    3/19/2023      PATIENT IDENTIFICATION:  Name: Estela Joshua ADMIT: 3/17/2023   : 1940  PCP: Robert Miner MD    MRN: [id number] LOS: 0 days   AGE/SEX: 82 y.o. female  ROOM: [facility]                     LOS 0    Reason for visit: Shortness of breath      SUBJECTIVE:      Still has complaints of shortness of breath with exertion.      Objective   OBJECTIVE:    Vital Sign Min/Max for last 24 hours  Temp  Min: 97.9 °F (36.6 °C)  Max: 98.6 °F (37 °C)   BP  Min: 122/58  Max: 144/73   Pulse  Min: 50  Max: 58   Resp  Min: 18  Max: 18   SpO2  Min: 92 %  Max: 98 %   No data recorded   Weight  Min: 69.1 kg (152 lb 5.4 oz)  Max: 70.3 kg (155 lb)                         Body mass index is 27.86 kg/m².    Intake/Output Summary (Last 24 hours) at 3/19/2023 0939  Last data filed at 3/18/2023 2200  Gross per 24 hour   Intake 960 ml   Output --   Net 960 ml         Exam:  GEN:  No distress, appears stated age  NECK:  midline trachea  LUNGS: Nonlabored      Assessment     Scheduled meds:  amLODIPine, 5 mg, Oral, BID  aspirin, 81 mg, Oral, Daily  atorvastatin, 40 mg, Oral, Nightly  budesonide-formoterol, 2 puff, Inhalation, BID - RT  enoxaparin, 40 mg, Subcutaneous, Q24H  fluticasone, 1 spray, Each Nare, BID  furosemide, 20 mg, Oral, Daily  pantoprazole, 40 mg, Oral, Daily  sertraline, 100 mg, Oral, Daily      IV meds:                         Data Review:  Results from last 7 days   Lab Units 23  0342 23  0547 23  1521   SODIUM mmol/L 143 142 142   POTASSIUM mmol/L 3.8 4.2 4.0   CHLORIDE mmol/L 107 110* 106   CO2 mmol/L 26.0 22.7 24.3   BUN mg/dL 22 20 29*   CREATININE mg/dL 0.70 0.58 0.81   GLUCOSE mg/dL 97 118* 93   CALCIUM mg/dL 8.9 9.4 9.7         Estimated Creatinine Clearance: 56.4 mL/min (by C-G formula based on SCr of 0.7 mg/dL).  Results from last 7 days   Lab Units 23  0342 23  0547 23  1521   WBC  10*3/mm3 6.69 5.40 5.26   HEMOGLOBIN g/dL 11.5* 11.8* 12.2   PLATELETS 10*3/mm3 163 183 164         Results from last 7 days   Lab Units 03/17/23  1521   ALT (SGPT) U/L 25   AST (SGOT) U/L 21                 No results found for: HGBA1C, POCGLU    CT chest 3/17 reviewed: Groundglass pulmonary opacification right upper lobe at 2.1 cm and a few scattered sub-6 mm pulmonary nodules.        Lower extremity Dopplers reviewed: Negative      2D echo reviewed: EF 69%.  Grade 2 diastolic dysfunction.  Right ventricular systolic pressure moderately elevated with RVSP 40.6 mmHg.            Active Hospital Problems    Diagnosis  POA   • **Exertional dyspnea [R06.09]  Yes   • Lung nodule [R91.1]  Yes   • Pericardial effusion [I31.39]  Yes   • GERD (gastroesophageal reflux disease) [K21.9]  Yes   • Hypertension [I10]  Yes      Resolved Hospital Problems   No resolved problems to display.         ASSESSMENT:    1. Dyspnea on exertion  2. Stable angina  3. Diastolic dysfunction  4. Pulmonary hypertension secondary to left-sided disease  5. Small to moderate pericardial effusion on CT chest from 3/17/2023  6. Moderate mitral regurgitation on ECHO from 1/10/17  7. Scattered pulmonary nodules on CT chest from 3/17/2023  8. Hyperlipidemia  9. Hypertension  10. Pedal edema  11. GERD  12. Obesity, BMI 28.59  13. Former tobacco abuse, total 30 pack years        PLAN:    Unclear etiology of her dyspnea with exertion.  No signs of clinical infection and no wheezing.  Saturations on room air at rest 99%.  Plan for stress test today.  Continue Symbicort for potential obstructive lung disease.  Plan repeat CT chest as outpatient for follow-up of nodularity in 3 months.  Following peripherally for pulmonary issues.        Zachery Glez MD  Pulmonary and Critical Care Medicine  [facility], [facility]  3/19/2023    09:39 EDT

## 2023-03-19 NOTE — PLAN OF CARE
Problem: Adult Inpatient Plan of Care  Goal: Plan of Care Review  Outcome: Ongoing, Progressing  Flowsheets  Taken 3/19/2023 1824 by Ariella Guzman RN  Outcome Evaluation: patient to d/c home this shift  Taken 3/19/2023 0343 by Day Streeter, RN  Progress: improving  Taken 3/18/2023 0355 by Day Streeter, RN  Plan of Care Reviewed With: patient  Goal: Patient-Specific Goal (Individualized)  Outcome: Ongoing, Progressing  Goal: Absence of Hospital-Acquired Illness or Injury  Outcome: Ongoing, Progressing  Intervention: Identify and Manage Fall Risk  Recent Flowsheet Documentation  Taken 3/19/2023 1400 by Ariella Guzman RN  Safety Promotion/Fall Prevention:   activity supervised   assistive device/personal items within reach   clutter free environment maintained   fall prevention program maintained   nonskid shoes/slippers when out of bed   room organization consistent   safety round/check completed  Taken 3/19/2023 1200 by Ariella Guzman RN  Safety Promotion/Fall Prevention:   activity supervised   assistive device/personal items within reach   clutter free environment maintained   fall prevention program maintained   nonskid shoes/slippers when out of bed   safety round/check completed   room organization consistent  Taken 3/19/2023 1000 by Ariella Guzman RN  Safety Promotion/Fall Prevention: patient off unit  Taken 3/19/2023 0800 by Ariella Guzman RN  Safety Promotion/Fall Prevention:   activity supervised   assistive device/personal items within reach   clutter free environment maintained   fall prevention program maintained   nonskid shoes/slippers when out of bed   room organization consistent   safety round/check completed  Intervention: Prevent Skin Injury  Recent Flowsheet Documentation  Taken 3/19/2023 1400 by Ariella Guzman RN  Body Position:   position changed independently   patient/family refused  Skin Protection: adhesive use limited  Taken 3/19/2023 1200 by Ariella Guzman  RN  Body Position:   patient/family refused   position changed independently  Taken 3/19/2023 0800 by Ariella Guzman RN  Body Position:   position changed independently   patient/family refused  Skin Protection: adhesive use limited  Intervention: Prevent and Manage VTE (Venous Thromboembolism) Risk  Recent Flowsheet Documentation  Taken 3/19/2023 1400 by Ariella Guzman RN  Activity Management:   activity adjusted per tolerance   activity encouraged  VTE Prevention/Management: patient refused intervention  Range of Motion: active ROM (range of motion) encouraged  Taken 3/19/2023 1200 by Ariella Guzman RN  Activity Management:   activity adjusted per tolerance   activity encouraged  Taken 3/19/2023 0800 by Ariella Guzman RN  Activity Management:   activity encouraged   activity adjusted per tolerance  VTE Prevention/Management: patient refused intervention  Range of Motion: active ROM (range of motion) encouraged  Goal: Optimal Comfort and Wellbeing  Outcome: Ongoing, Progressing  Intervention: Provide Person-Centered Care  Recent Flowsheet Documentation  Taken 3/19/2023 1400 by Ariella Guzman RN  Trust Relationship/Rapport:   care explained   choices provided   questions answered   reassurance provided   thoughts/feelings acknowledged  Taken 3/19/2023 0800 by Ariella Guzman RN  Trust Relationship/Rapport:   care explained   choices provided   questions answered   reassurance provided   thoughts/feelings acknowledged  Goal: Readiness for Transition of Care  Outcome: Ongoing, Progressing     Problem: Fall Injury Risk  Goal: Absence of Fall and Fall-Related Injury  Outcome: Ongoing, Progressing  Intervention: Promote Injury-Free Environment  Recent Flowsheet Documentation  Taken 3/19/2023 1400 by Ariella Guzman RN  Safety Promotion/Fall Prevention:   activity supervised   assistive device/personal items within reach   clutter free environment maintained   fall prevention program maintained    nonskid shoes/slippers when out of bed   room organization consistent   safety round/check completed  Taken 3/19/2023 1200 by Ariella Guzman, RN  Safety Promotion/Fall Prevention:   activity supervised   assistive device/personal items within reach   clutter free environment maintained   fall prevention program maintained   nonskid shoes/slippers when out of bed   safety round/check completed   room organization consistent  Taken 3/19/2023 1000 by Ariella Guzman RN  Safety Promotion/Fall Prevention: patient off unit  Taken 3/19/2023 0800 by Ariella Guzman, RN  Safety Promotion/Fall Prevention:   activity supervised   assistive device/personal items within reach   clutter free environment maintained   fall prevention program maintained   nonskid shoes/slippers when out of bed   room organization consistent   safety round/check completed     Problem: COPD (Chronic Obstructive Pulmonary Disease) Comorbidity  Goal: Maintenance of COPD Symptom Control  Outcome: Ongoing, Progressing     Problem: Hypertension Comorbidity  Goal: Blood Pressure in Desired Range  Outcome: Ongoing, Progressing   Goal Outcome Evaluation:              Outcome Evaluation: patient to d/c home this shift

## 2023-03-20 ENCOUNTER — TRANSITIONAL CARE MANAGEMENT TELEPHONE ENCOUNTER (OUTPATIENT)
Dept: CALL CENTER | Facility: HOSPITAL | Age: 83
End: 2023-03-20
Payer: MEDICARE

## 2023-03-20 NOTE — OUTREACH NOTE
Call Center TCM Note    Flowsheet Row Responses   [facility] patient discharged from? [place]   Does the patient have one of the following disease processes/diagnoses(primary or secondary)? Other   TCM attempt successful? Yes   Call start time 1542   Call end time 1548   Discharge diagnosis Exertional dyspnea, pericardial effusion   Person spoke with today (if not patient) and relationship Patient   Meds reviewed with patient/caregiver? Yes  [New: spironolactone]   Does the patient have all medications ordered at discharge? No   What is keeping the patient from filling the prescriptions? --  [Patient reports going this afternoon to [facility] to  new med. ]   Nursing Interventions No intervention needed   Is the patient taking all medications as directed (includes completed medication regime)? --  [Taking usual home meds. Will start the spironolactone after picking up today. ]   Comments PCP Robert Miner MD [facility] FOLLOW UP 3/21/2023  8:15 AM   Does the patient have an appointment with their PCP within 7 days of discharge? Yes   Has home health visited the patient within 72 hours of discharge? N/A   Psychosocial issues? No   Comments Patient would like to have PCP to fill out form for her to have a handicap parking pass.    Did the patient receive a copy of their discharge instructions? Yes   Nursing interventions Reviewed instructions with patient   What is the patient's perception of their health status since discharge? Improving   Is the patient/caregiver able to teach back signs and symptoms related to disease process for when to call PCP? Yes   If the patient is a current smoker, are they able to teach back resources for cessation? Not a smoker   TCM call completed? Yes   Call end time 1548   Would this patient benefit from a Referral to Amb Social Work? No   Is the patient interested in additional calls from an ambulatory ?  NOTE:  applies to high risk patients requiring  additional follow-up. No          Heike Barrera RN    3/20/2023, 15:48 EDT

## 2023-03-21 ENCOUNTER — OFFICE VISIT (OUTPATIENT)
Dept: FAMILY MEDICINE CLINIC | Facility: CLINIC | Age: 83
End: 2023-03-21
Payer: MEDICARE

## 2023-03-21 VITALS
WEIGHT: 151 LBS | HEIGHT: 62 IN | SYSTOLIC BLOOD PRESSURE: 130 MMHG | BODY MASS INDEX: 27.79 KG/M2 | HEART RATE: 68 BPM | OXYGEN SATURATION: 99 % | DIASTOLIC BLOOD PRESSURE: 56 MMHG | TEMPERATURE: 96.9 F

## 2023-03-21 DIAGNOSIS — R06.09 EXERTIONAL DYSPNEA: Primary | ICD-10-CM

## 2023-03-21 DIAGNOSIS — R91.1 LUNG NODULE: ICD-10-CM

## 2023-03-21 DIAGNOSIS — Z09 HOSPITAL DISCHARGE FOLLOW-UP: ICD-10-CM

## 2023-03-21 DIAGNOSIS — I10 PRIMARY HYPERTENSION: ICD-10-CM

## 2023-03-21 RX ORDER — ALBUTEROL SULFATE 90 UG/1
2 AEROSOL, METERED RESPIRATORY (INHALATION) EVERY 6 HOURS PRN
Qty: 18 G | Refills: 5 | Status: SHIPPED | OUTPATIENT
Start: 2023-03-21 | End: 2023-03-21

## 2023-03-21 RX ORDER — ALBUTEROL SULFATE 90 UG/1
2 AEROSOL, METERED RESPIRATORY (INHALATION) EVERY 6 HOURS PRN
Qty: 18 G | Refills: 5 | Status: SHIPPED | OUTPATIENT
Start: 2023-03-21

## 2023-03-21 NOTE — PROGRESS NOTES
Juan Manuel Joshua is a 82 y.o. female. Patient is here today for   Chief Complaint   Patient presents with   • Hospital Follow Up Visit       (Not on file)-  Risk for Readmission (LACE) Score: 4 (3/19/2023  6:00 AM)           Vitals:    23 0812   BP: 130/56   Pulse: 68   Temp: 96.9 °F (36.1 °C)   SpO2: 99%     The following portions of the patient's history were reviewed and updated as appropriate: allergies, current medications, past family history, past medical history, past social history, past surgical history and problem list.    Past Medical History:   Diagnosis Date   • COPD (chronic obstructive pulmonary disease) (HCC)    • Depression    • GERD (gastroesophageal reflux disease)    • Hyperglycemia    • Hyperlipidemia    • Hypertension    • Macular degeneration    • Osteoarthritis       No Known Allergies   Social History     Socioeconomic History   • Marital status:    Tobacco Use   • Smoking status: Former     Packs/day: 1.00     Years: 20.00     Pack years: 20.00     Types: Cigarettes     Quit date:      Years since quittin.2   • Smokeless tobacco: Never   Substance and Sexual Activity   • Alcohol use: Yes     Alcohol/week: 2.0 standard drinks     Types: 1 Glasses of wine, 1 Cans of beer per week   • Drug use: No   • Sexual activity: Defer        Current Outpatient Medications:   •  albuterol sulfate  (90 Base) MCG/ACT inhaler, Inhale 2 puffs Every 6 (Six) Hours As Needed for Wheezing., Disp: 18 g, Rfl: 5  •  alendronate (Fosamax) 70 MG tablet, Take 1 tablet by mouth Every 7 (Seven) Days., Disp: 12 tablet, Rfl: 3  •  amLODIPine (NORVASC) 5 MG tablet, Take 1 tablet by mouth 2 (Two) Times a Day., Disp: 180 tablet, Rfl: 1  •  aspirin 81 MG EC tablet, Take 1 tablet by mouth Daily., Disp: , Rfl:   •  atorvastatin (LIPITOR) 40 MG tablet, Take 1 tablet by mouth Every Night., Disp: 90 tablet, Rfl: 3  •  BESIVANCE 0.6 % suspension ophthalmic suspension, SHAKE LQ AND INT 1 GTT IN  OD BID, Disp: , Rfl: 1  •  budesonide-formoterol (Symbicort) 80-4.5 MCG/ACT inhaler, Inhale 2 puffs 2 (Two) Times a Day., Disp: 3 each, Rfl: 3  •  diclofenac (VOLTAREN) 75 MG EC tablet, Take 1 tablet by mouth 2 (Two) Times a Day With Meals., Disp: 180 tablet, Rfl: 1  •  fluticasone (FLONASE) 50 MCG/ACT nasal spray, Use 1 spray in each nostril morning and evening, Disp: 16 g, Rfl: 5  •  furosemide (LASIX) 20 MG tablet, Take 1 tablet by mouth Daily., Disp: 90 tablet, Rfl: 1  •  Glucosamine HCl (GLUCOSAMINE PO), Take 50 mg by mouth Daily., Disp: , Rfl:   •  Multiple Vitamins-Minerals (EYE VITAMINS PO), Take  by mouth., Disp: , Rfl:   •  pantoprazole (PROTONIX) 40 MG EC tablet, Take 1 tablet by mouth Daily., Disp: 90 tablet, Rfl: 3  •  sertraline (ZOLOFT) 100 MG tablet, Take 1 tablet by mouth Daily., Disp: 90 tablet, Rfl: 3  •  spironolactone (ALDACTONE) 25 MG tablet, Take 1 tablet by mouth Daily., Disp: 30 tablet, Rfl: 0     Objective     History of Present Illness Estela is here today for hospital discharge follow-up.  She was admitted to [facility] 4 days ago with dyspnea on exertion.  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  Cardiac evaluation was significant for echocardiogram which showed grade 2 diastolic dysfunction.  She has a previous smoking history and COPD and is on Symbicort.  Who is recommended that she have pulmonary function test as an outpatient after cardiac evaluation was complete.  CT of the chest did show some pulmonary opacification and follow-up CT was recommended in 6 to 8 weeks.  Her main complaint at present is left knee arthritis.  She did see Dr. Irwin in 2018.    Review of Systems   Constitutional: Negative for fever.   Respiratory: Positive for shortness of breath. Negative for cough.    Cardiovascular: Negative for chest pain and leg swelling.   Musculoskeletal:        As in HPI   Neurological: Negative.    Psychiatric/Behavioral: Negative.         Physical Exam  Vitals reviewed.   Constitutional:       Appearance: Normal appearance. She is obese.   Cardiovascular:      Rate and Rhythm: Normal rate and regular rhythm.      Heart sounds: Normal heart sounds.   Pulmonary:      Effort: Pulmonary effort is normal.      Breath sounds: No wheezing or rales.   Musculoskeletal:      Right lower leg: No edema.      Left lower leg: No edema.   Neurological:      Mental Status: She is alert.   Psychiatric:         Mood and Affect: Mood normal.         Behavior: Behavior normal.         Thought Content: Thought content normal.         Judgment: Judgment normal.         ASSESSMENT and discussed hospital records as well as hospital discharge medication list.  Discussed pathophysiology of diastolic dysfunction at length.  We will schedule pulmonary function test and follow-up afterwards.  Follow-up with cardiology and sleep medicine as scheduled.     Problems Addressed this Visit        Cardiac and Vasculature    Hypertension    Exertional dyspnea - Primary    Relevant Medications    albuterol sulfate  (90 Base) MCG/ACT inhaler    Other Relevant Orders    Full Pulmonary Function Test With Bronchodilator       Health Encounters    Hospital discharge follow-up       Pulmonary and Pneumonias    Lung nodule    Relevant Medications    albuterol sulfate  (90 Base) MCG/ACT inhaler   Diagnoses       Codes Comments    Exertional dyspnea    -  Primary ICD-10-CM: R06.09  ICD-9-CM: 786.09     Primary hypertension     ICD-10-CM: I10  ICD-9-CM: 401.9     Lung nodule     ICD-10-CM: R91.1  ICD-9-CM: 793.11     Hospital discharge follow-up     ICD-10-CM: Z09  ICD-9-CM: V67.59           Current outpatient and discharge medications have been reconciled for the patient.  Reviewed by: Robert Miner MD      PLAN    There are no Patient Instructions on file for this visit.  No follow-ups on file.

## 2023-04-10 ENCOUNTER — HOSPITAL ENCOUNTER (OUTPATIENT)
Dept: SLEEP MEDICINE | Facility: HOSPITAL | Age: 83
End: 2023-04-10
Payer: MEDICARE

## 2023-04-10 ENCOUNTER — HOSPITAL ENCOUNTER (OUTPATIENT)
Dept: RESPIRATORY THERAPY | Facility: HOSPITAL | Age: 83
Discharge: HOME | End: 2023-04-10
Payer: MEDICARE

## 2023-04-10 ENCOUNTER — LAB (OUTPATIENT)
Dept: LAB | Facility: HOSPITAL | Age: 83
End: 2023-04-10
Payer: MEDICARE

## 2023-04-10 DIAGNOSIS — G47.33 OSA (OBSTRUCTIVE SLEEP APNEA): ICD-10-CM

## 2023-04-10 LAB
ALBUMIN SERPL-MCNC: 4.3 G/DL (ref 3.5–5.2)
ALBUMIN/GLOB SERPL: 1.6 G/DL
ALP SERPL-CCNC: 31 U/L (ref 39–117)
ALT SERPL W P-5'-P-CCNC: 22 U/L (ref 1–33)
ANION GAP SERPL CALCULATED.3IONS-SCNC: 9.5 MMOL/L (ref 5–15)
AST SERPL-CCNC: 22 U/L (ref 1–32)
BACTERIA UR QL AUTO: NORMAL /HPF
BASOPHILS # BLD AUTO: 0.04 10*3/MM3 (ref 0–0.2)
BASOPHILS NFR BLD AUTO: 0.4 % (ref 0–1.5)
BILIRUB SERPL-MCNC: 0.4 MG/DL (ref 0–1.2)
BILIRUB UR QL STRIP: NEGATIVE
BUN SERPL-MCNC: 31 MG/DL (ref 8–23)
BUN/CREAT SERPL: 31.3 (ref 7–25)
CALCIUM SPEC-SCNC: 9.9 MG/DL (ref 8.6–10.5)
CHLORIDE SERPL-SCNC: 105 MMOL/L (ref 98–107)
CHOLEST SERPL-MCNC: 167 MG/DL (ref 0–200)
CLARITY UR: CLEAR
CO2 SERPL-SCNC: 25.5 MMOL/L (ref 22–29)
COLOR UR: YELLOW
CREAT SERPL-MCNC: 0.99 MG/DL (ref 0.57–1)
DEPRECATED RDW RBC AUTO: 43.3 FL (ref 37–54)
EGFRCR SERPLBLD CKD-EPI 2021: 57 ML/MIN/1.73
EOSINOPHIL # BLD AUTO: 0.05 10*3/MM3 (ref 0–0.4)
EOSINOPHIL NFR BLD AUTO: 0.5 % (ref 0.3–6.2)
ERYTHROCYTE [DISTWIDTH] IN BLOOD BY AUTOMATED COUNT: 12.7 % (ref 12.3–15.4)
GLOBULIN UR ELPH-MCNC: 2.7 GM/DL
GLUCOSE SERPL-MCNC: 74 MG/DL (ref 65–99)
GLUCOSE UR STRIP-MCNC: NEGATIVE MG/DL
HCT VFR BLD AUTO: 40.7 % (ref 34–46.6)
HDLC SERPL-MCNC: 45 MG/DL (ref 40–60)
HGB BLD-MCNC: 13.4 G/DL (ref 12–15.9)
HGB UR QL STRIP.AUTO: NEGATIVE
HYALINE CASTS UR QL AUTO: NORMAL /LPF
IMM GRANULOCYTES # BLD AUTO: 0.03 10*3/MM3 (ref 0–0.05)
IMM GRANULOCYTES NFR BLD AUTO: 0.3 % (ref 0–0.5)
KETONES UR QL STRIP: ABNORMAL
LDLC SERPL CALC-MCNC: 95 MG/DL (ref 0–100)
LDLC/HDLC SERPL: 2.02 {RATIO}
LEUKOCYTE ESTERASE UR QL STRIP.AUTO: ABNORMAL
LYMPHOCYTES # BLD AUTO: 1.16 10*3/MM3 (ref 0.7–3.1)
LYMPHOCYTES NFR BLD AUTO: 11.4 % (ref 19.6–45.3)
MCH RBC QN AUTO: 30.7 PG (ref 26.6–33)
MCHC RBC AUTO-ENTMCNC: 32.9 G/DL (ref 31.5–35.7)
MCV RBC AUTO: 93.1 FL (ref 79–97)
MONOCYTES # BLD AUTO: 0.62 10*3/MM3 (ref 0.1–0.9)
MONOCYTES NFR BLD AUTO: 6.1 % (ref 5–12)
NEUTROPHILS NFR BLD AUTO: 8.27 10*3/MM3 (ref 1.7–7)
NEUTROPHILS NFR BLD AUTO: 81.3 % (ref 42.7–76)
NITRITE UR QL STRIP: NEGATIVE
NRBC BLD AUTO-RTO: 0 /100 WBC (ref 0–0.2)
PH UR STRIP.AUTO: <=5 [PH] (ref 5–8)
PLATELET # BLD AUTO: 239 10*3/MM3 (ref 140–450)
PMV BLD AUTO: 9.5 FL (ref 6–12)
POTASSIUM SERPL-SCNC: 4.2 MMOL/L (ref 3.5–5.2)
PROT SERPL-MCNC: 7 G/DL (ref 6–8.5)
PROT UR QL STRIP: NEGATIVE
RBC # BLD AUTO: 4.37 10*6/MM3 (ref 3.77–5.28)
RBC # UR STRIP: NORMAL /HPF
REF LAB TEST METHOD: NORMAL
SODIUM SERPL-SCNC: 140 MMOL/L (ref 136–145)
SP GR UR STRIP: 1.02 (ref 1–1.03)
SQUAMOUS #/AREA URNS HPF: NORMAL /HPF
TRIGL SERPL-MCNC: 156 MG/DL (ref 0–150)
TSH SERPL DL<=0.05 MIU/L-ACNC: 2.37 UIU/ML (ref 0.27–4.2)
UROBILINOGEN UR QL STRIP: ABNORMAL
VLDLC SERPL-MCNC: 27 MG/DL (ref 5–40)
WBC # UR STRIP: NORMAL /HPF
WBC NRBC COR # BLD: 10.17 10*3/MM3 (ref 3.4–10.8)

## 2023-04-10 PROCEDURE — 80061 LIPID PANEL: CPT | Performed by: INTERNAL MEDICINE

## 2023-04-10 PROCEDURE — 94726 PLETHYSMOGRAPHY LUNG VOLUMES: CPT

## 2023-04-10 PROCEDURE — 80053 COMPREHEN METABOLIC PANEL: CPT | Performed by: INTERNAL MEDICINE

## 2023-04-10 PROCEDURE — 94060 EVALUATION OF WHEEZING: CPT

## 2023-04-10 PROCEDURE — 85025 COMPLETE CBC W/AUTO DIFF WBC: CPT | Performed by: INTERNAL MEDICINE

## 2023-04-10 PROCEDURE — 84443 ASSAY THYROID STIM HORMONE: CPT | Performed by: INTERNAL MEDICINE

## 2023-04-10 RX ORDER — ALBUTEROL SULFATE 2.5 MG/3ML
2.5 SOLUTION RESPIRATORY (INHALATION) ONCE AS NEEDED
Status: COMPLETED | OUTPATIENT
Start: 2023-04-10 | End: 2023-04-10

## 2023-04-10 RX ADMIN — ALBUTEROL SULFATE 2.5 MG: 2.5 SOLUTION RESPIRATORY (INHALATION) at 10:34

## 2023-04-11 ENCOUNTER — TELEPHONE (OUTPATIENT)
Dept: SLEEP MEDICINE | Facility: HOSPITAL | Age: 83
End: 2023-04-11
Payer: MEDICARE

## 2023-04-11 NOTE — TELEPHONE ENCOUNTER
Lm for pt to cb and reschedule HST. Pt failed HST due to bad nasal canula reading. Needs to repeat HST

## 2023-04-17 ENCOUNTER — HOSPITAL ENCOUNTER (OUTPATIENT)
Dept: SLEEP MEDICINE | Facility: HOSPITAL | Age: 83
End: 2023-04-17
Payer: MEDICARE

## 2023-04-17 PROCEDURE — 95806 SLEEP STUDY UNATT&RESP EFFT: CPT

## 2023-04-17 RX ORDER — SPIRONOLACTONE 25 MG/1
25 TABLET ORAL DAILY
Qty: 90 TABLET | Refills: 0 | OUTPATIENT
Start: 2023-04-17

## 2023-04-17 RX ORDER — SPIRONOLACTONE 25 MG/1
25 TABLET ORAL DAILY
Qty: 90 TABLET | Refills: 0 | Status: SHIPPED | OUTPATIENT
Start: 2023-04-17

## 2023-05-04 ENCOUNTER — TELEPHONE (OUTPATIENT)
Dept: SLEEP MEDICINE | Facility: HOSPITAL | Age: 83
End: 2023-05-04
Payer: MEDICARE

## 2023-05-16 ENCOUNTER — OFFICE VISIT (OUTPATIENT)
Dept: CARDIOLOGY | Facility: CLINIC | Age: 83
End: 2023-05-16
Payer: MEDICARE

## 2023-05-16 ENCOUNTER — HOSPITAL ENCOUNTER (OUTPATIENT)
Dept: CARDIOLOGY | Facility: HOSPITAL | Age: 83
Discharge: HOME | End: 2023-05-16
Admitting: INTERNAL MEDICINE
Payer: MEDICARE

## 2023-05-16 VITALS
HEART RATE: 65 BPM | WEIGHT: 156 LBS | HEIGHT: 62 IN | SYSTOLIC BLOOD PRESSURE: 100 MMHG | BODY MASS INDEX: 28.71 KG/M2 | OXYGEN SATURATION: 98 % | DIASTOLIC BLOOD PRESSURE: 60 MMHG

## 2023-05-16 DIAGNOSIS — E78.2 MIXED HYPERLIPIDEMIA: ICD-10-CM

## 2023-05-16 DIAGNOSIS — I10 PRIMARY HYPERTENSION: ICD-10-CM

## 2023-05-16 DIAGNOSIS — I50.32 CHRONIC DIASTOLIC CHF (CONGESTIVE HEART FAILURE): ICD-10-CM

## 2023-05-16 DIAGNOSIS — I10 PRIMARY HYPERTENSION: Primary | ICD-10-CM

## 2023-05-16 LAB
ANION GAP SERPL CALCULATED.3IONS-SCNC: 9 MMOL/L (ref 5–15)
BUN SERPL-MCNC: 35 MG/DL (ref 8–23)
BUN/CREAT SERPL: 38 (ref 7–25)
CALCIUM SPEC-SCNC: 9.9 MG/DL (ref 8.6–10.5)
CHLORIDE SERPL-SCNC: 106 MMOL/L (ref 98–107)
CO2 SERPL-SCNC: 26 MMOL/L (ref 22–29)
CREAT SERPL-MCNC: 0.92 MG/DL (ref 0.57–1)
EGFRCR SERPLBLD CKD-EPI 2021: 62.3 ML/MIN/1.73
GLUCOSE SERPL-MCNC: 134 MG/DL (ref 65–99)
POTASSIUM SERPL-SCNC: 4.2 MMOL/L (ref 3.5–5.2)
SODIUM SERPL-SCNC: 141 MMOL/L (ref 136–145)

## 2023-05-16 PROCEDURE — 36415 COLL VENOUS BLD VENIPUNCTURE: CPT

## 2023-05-16 PROCEDURE — 80048 BASIC METABOLIC PNL TOTAL CA: CPT | Performed by: INTERNAL MEDICINE

## 2023-05-16 NOTE — PROGRESS NOTES
PATIENTINFORMATION    Date of Office Visit: 2023  Encounter Provider: Ranulfo Kirby MD  Place of Service: [facility] CARDIOLOGY  Patient Name: Estela Joshua  : 1940    Subjective:     Encounter Date:2023      Patient ID: Estela Joshua is a 82 y.o. female.    No chief complaint on file.    HPI  Ms. Joshua is a pleasant 82 years old lady who came to cardiology clinic for follow-up visit.  She was admitted back in 2023 for likely CHF exacerbation.  She has been doing fairly well since then and she is active and does walk fairly.  Denies any significant shortness of breath orthopnea, PND, palpitations, syncope.  She reports feeling lightheaded from time to time and home blood pressure readings are usually around 100/60 mmHg.      ROS  All systems reviewed and negative except as noted in HPI.    Past Medical History:   Diagnosis Date   • COPD (chronic obstructive pulmonary disease)    • Depression    • GERD (gastroesophageal reflux disease)    • Hyperglycemia    • Hyperlipidemia    • Hypertension    • Macular degeneration    • Osteoarthritis        Past Surgical History:   Procedure Laterality Date   • APPENDECTOMY     • BREAST EXCISIONAL BIOPSY Right     ; benign   • CARDIAC CATHETERIZATION     • CARDIAC CATHETERIZATION N/A 2017    Procedure: Right and Left Heart Cath;  Surgeon: Pramod Salvador MD;  Location: [facility] CATH INVASIVE LOCATION;  Service:    • CARDIAC CATHETERIZATION N/A 2017    Procedure: Coronary angiography;  Surgeon: Pramod Salvador MD;  Location: [facility] CATH INVASIVE LOCATION;  Service:    • CARDIAC CATHETERIZATION N/A 2017    Procedure: Left ventriculography;  Surgeon: Pramod Salvador MD;  Location: [facility] CATH INVASIVE LOCATION;  Service:    • CATARACT EXTRACTION     • COLONOSCOPY N/A 2017    Procedure: COLONOSCOPY TO CECUM;  Surgeon: Siddhartha Mujica MD;  Location: [facility] ENDOSCOPY;  Service:    •  "ENDOSCOPY N/A 2017    Procedure: ESOPHAGOGASTRODUODENOSCOPY WITH COLD BIOPSIES;  Surgeon: Siddhartha Mujica MD;  Location: [facility] ENDOSCOPY;  Service:    • LAPAROSCOPIC TUBAL LIGATION         Social History     Socioeconomic History   • Marital status:    Tobacco Use   • Smoking status: Former     Packs/day: 1.00     Years: 20.00     Pack years: 20.00     Types: Cigarettes     Quit date:      Years since quittin.3   • Smokeless tobacco: Never   Substance and Sexual Activity   • Alcohol use: Yes     Alcohol/week: 2.0 standard drinks     Types: 1 Glasses of wine, 1 Cans of beer per week   • Drug use: No   • Sexual activity: Defer       Family History   Problem Relation Age of Onset   • Alzheimer's disease Mother    • Stroke Mother    • Heart failure Father          Procedures       Objective:     /60   Pulse 65   Ht 157.5 cm (62.01\")   Wt 70.8 kg (156 lb)   SpO2 98%   BMI 28.52 kg/m²  Body mass index is 28.52 kg/m².     Constitutional:       General: Not in acute distress.     Appearance: Well-developed. Not diaphoretic.   Eyes:      Pupils: Pupils are equal, round, and reactive to light.   HENT:      Head: Normocephalic and atraumatic.   Neck:      Thyroid: No thyromegaly.   Pulmonary:      Effort: Pulmonary effort is normal. No respiratory distress.      Breath sounds: Normal breath sounds. No wheezing. No rales.   Chest:      Chest wall: Not tender to palpatation.   Cardiovascular:      Normal rate. Regular rhythm.      No gallop.   Pulses:     Intact distal pulses.   Edema:     Peripheral edema absent.   Abdominal:      General: Bowel sounds are normal. There is no distension.      Palpations: Abdomen is soft.      Tenderness: There is no guarding.   Musculoskeletal: Normal range of motion.         General: No deformity.      Cervical back: Normal range of motion and neck supple. Skin:     General: Skin is warm and dry.      Findings: No rash.   Neurological:      Mental Status: " Alert and oriented to person, place, and time.      Cranial Nerves: No cranial nerve deficit.      Deep Tendon Reflexes: Reflexes are normal and symmetric.   Psychiatric:         Judgment: Judgment normal.         Review Of Data: I have reviewed pertinent recent labs, images and documents and pertinent findings included in HPI or assessment below.    Lipid Panel        10/31/2022    08:41 4/10/2023    10:02   Lipid Panel   Total Cholesterol  167     Total Cholesterol 157      Triglycerides 87   156     HDL Cholesterol 49   45     VLDL Cholesterol 16   27     LDL Cholesterol  92   95     LDL/HDL Ratio 1.85   2.02           Assessment/Plan:         1. Chronic diastolic congestive heart failure with hospital admission in March 2023  2. Essential hypertension  3. Coronary artery calcification of LAD noted on CT chest.  Reportedly normal coronary angiogram with absent right coronary artery stenting 2017.  Normal myocardial perfusion study in March 2023.  4. Mild MR/TR/mild pulmonary hypertension    Euvolemic on exam on Lasix/spironolactone.  Blood pressure on the lower side.  She will start taking amlodipine only once daily.  May need further blood pressure medication dose adjustment during follow-up visit with PCP or me.  Check BMP and proBNP today.  Follow-up in cardiology clinic in 6 months or sooner with any concerning symptoms.  Diagnosis and plan of care discussed with patient and verbalized understanding.            Your medication list          Accurate as of May 16, 2023  1:58 PM. If you have any questions, ask your nurse or doctor.            CONTINUE taking these medications      Instructions Last Dose Given Next Dose Due   albuterol sulfate  (90 Base) MCG/ACT inhaler  Commonly known as: PROVENTIL HFA;VENTOLIN HFA;PROAIR HFA      Inhale 2 puffs Every 6 (Six) Hours As Needed for Wheezing.       alendronate 70 MG tablet  Commonly known as: Fosamax      Take 1 tablet by mouth Every 7 (Seven) Days.        amLODIPine 5 MG tablet  Commonly known as: NORVASC      Take 1 tablet by mouth 2 (Two) Times a Day.       aspirin 81 MG EC tablet      Take 1 tablet by mouth Daily.       atorvastatin 40 MG tablet  Commonly known as: LIPITOR      Take 1 tablet by mouth Every Night.       Besivance 0.6 % suspension ophthalmic suspension  Generic drug: besifloxacin      SHAKE LQ AND INT 1 GTT IN OD BID       budesonide-formoterol 80-4.5 MCG/ACT inhaler  Commonly known as: Symbicort      Inhale 2 puffs 2 (Two) Times a Day.       diclofenac 75 MG EC tablet  Commonly known as: VOLTAREN      Take 1 tablet by mouth 2 (Two) Times a Day With Meals.       EYE VITAMINS PO      Take  by mouth.       fluticasone 50 MCG/ACT nasal spray  Commonly known as: FLONASE      Use 1 spray in each nostril morning and evening       furosemide 20 MG tablet  Commonly known as: LASIX      Take 1 tablet by mouth Daily.       GLUCOSAMINE PO      Take 50 mg by mouth Daily.       pantoprazole 40 MG EC tablet  Commonly known as: PROTONIX      Take 1 tablet by mouth Daily.       sertraline 100 MG tablet  Commonly known as: ZOLOFT      Take 1 tablet by mouth Daily.       spironolactone 25 MG tablet  Commonly known as: ALDACTONE      Take 1 tablet by mouth Daily.                  Ranulfo Kirby MD  05/16/23  13:58 EDT

## 2023-05-17 NOTE — PROGRESS NOTES
Please notify Ms. Joshua that from yesterday shows stable kidney and electrolyte levels.  Continue care we discussed in office.  Let me know if they have any questions.  Thank you

## 2023-05-25 RX ORDER — PANTOPRAZOLE SODIUM 40 MG/1
40 TABLET, DELAYED RELEASE ORAL DAILY
Qty: 90 TABLET | Refills: 3 | Status: SHIPPED | OUTPATIENT
Start: 2023-05-25

## 2023-06-07 ENCOUNTER — TELEPHONE (OUTPATIENT)
Dept: PEDIATRICS | Facility: OTHER | Age: 83
End: 2023-06-07

## 2023-06-07 NOTE — TELEPHONE ENCOUNTER
Caller: Estela Joshua    Relationship to patient: Self    Best call back number: [phone]     Type of visit: LABS    Requested date: 06/14/23, AT 8:00 AM    If rescheduling, when is the original appointment: 06/14/23 AT 9:00 AM

## 2023-06-14 DIAGNOSIS — I10 PRIMARY HYPERTENSION: Primary | ICD-10-CM

## 2023-06-14 DIAGNOSIS — E78.5 HYPERLIPIDEMIA, UNSPECIFIED HYPERLIPIDEMIA TYPE: ICD-10-CM

## 2023-06-14 DIAGNOSIS — E05.90 HYPERTHYROIDISM: ICD-10-CM

## 2023-06-14 DIAGNOSIS — E78.2 MIXED HYPERLIPIDEMIA: ICD-10-CM

## 2023-06-14 DIAGNOSIS — I10 HYPERTENSION, UNSPECIFIED TYPE: ICD-10-CM

## 2023-06-15 LAB
ALBUMIN SERPL-MCNC: 4.5 G/DL (ref 3.6–4.6)
ALBUMIN/GLOB SERPL: 2 {RATIO} (ref 1.2–2.2)
ALP SERPL-CCNC: 28 IU/L (ref 44–121)
ALT SERPL-CCNC: 22 IU/L (ref 0–32)
AST SERPL-CCNC: 21 IU/L (ref 0–40)
BASOPHILS # BLD AUTO: 0 X10E3/UL (ref 0–0.2)
BASOPHILS NFR BLD AUTO: 1 %
BILIRUB SERPL-MCNC: 0.4 MG/DL (ref 0–1.2)
BUN SERPL-MCNC: 32 MG/DL (ref 8–27)
BUN/CREAT SERPL: 32 (ref 12–28)
CALCIUM SERPL-MCNC: 9.7 MG/DL (ref 8.7–10.3)
CHLORIDE SERPL-SCNC: 105 MMOL/L (ref 96–106)
CHOLEST SERPL-MCNC: 177 MG/DL (ref 100–199)
CO2 SERPL-SCNC: 21 MMOL/L (ref 20–29)
CREAT SERPL-MCNC: 1.01 MG/DL (ref 0.57–1)
EGFRCR SERPLBLD CKD-EPI 2021: 56 ML/MIN/1.73
EOSINOPHIL # BLD AUTO: 0.1 X10E3/UL (ref 0–0.4)
EOSINOPHIL NFR BLD AUTO: 2 %
ERYTHROCYTE [DISTWIDTH] IN BLOOD BY AUTOMATED COUNT: 13 % (ref 11.7–15.4)
GLOBULIN SER CALC-MCNC: 2.2 G/DL (ref 1.5–4.5)
GLUCOSE SERPL-MCNC: 89 MG/DL (ref 70–99)
HCT VFR BLD AUTO: 39 % (ref 34–46.6)
HDLC SERPL-MCNC: 45 MG/DL
HGB BLD-MCNC: 13.2 G/DL (ref 11.1–15.9)
IMM GRANULOCYTES # BLD AUTO: 0 X10E3/UL (ref 0–0.1)
IMM GRANULOCYTES NFR BLD AUTO: 0 %
LDLC SERPL CALC-MCNC: 103 MG/DL (ref 0–99)
LDLC/HDLC SERPL: 2.3 RATIO (ref 0–3.2)
LYMPHOCYTES # BLD AUTO: 1.7 X10E3/UL (ref 0.7–3.1)
LYMPHOCYTES NFR BLD AUTO: 25 %
MCH RBC QN AUTO: 31.9 PG (ref 26.6–33)
MCHC RBC AUTO-ENTMCNC: 33.8 G/DL (ref 31.5–35.7)
MCV RBC AUTO: 94 FL (ref 79–97)
MONOCYTES # BLD AUTO: 0.5 X10E3/UL (ref 0.1–0.9)
MONOCYTES NFR BLD AUTO: 7 %
NEUTROPHILS # BLD AUTO: 4.5 X10E3/UL (ref 1.4–7)
NEUTROPHILS NFR BLD AUTO: 65 %
PLATELET # BLD AUTO: 209 X10E3/UL (ref 150–450)
POTASSIUM SERPL-SCNC: 4.6 MMOL/L (ref 3.5–5.2)
PROT SERPL-MCNC: 6.7 G/DL (ref 6–8.5)
RBC # BLD AUTO: 4.14 X10E6/UL (ref 3.77–5.28)
SODIUM SERPL-SCNC: 141 MMOL/L (ref 134–144)
TRIGL SERPL-MCNC: 164 MG/DL (ref 0–149)
TSH SERPL DL<=0.005 MIU/L-ACNC: 3.27 UIU/ML (ref 0.45–4.5)
UNABLE TO VOID: NORMAL
VLDLC SERPL CALC-MCNC: 29 MG/DL (ref 5–40)
WBC # BLD AUTO: 6.8 X10E3/UL (ref 3.4–10.8)

## 2023-08-15 RX ORDER — SPIRONOLACTONE 25 MG/1
25 TABLET ORAL DAILY
Qty: 90 TABLET | Refills: 2 | Status: SHIPPED | OUTPATIENT
Start: 2023-08-15

## 2023-08-18 RX ORDER — FUROSEMIDE 20 MG/1
TABLET ORAL
Qty: 90 TABLET | Refills: 1 | Status: SHIPPED | OUTPATIENT
Start: 2023-08-18

## 2023-10-10 RX ORDER — SPIRONOLACTONE 25 MG/1
25 TABLET ORAL DAILY
Qty: 90 TABLET | Refills: 2 | Status: SHIPPED | OUTPATIENT
Start: 2023-10-10

## 2023-12-01 ENCOUNTER — OFFICE VISIT (OUTPATIENT)
Age: 83
End: 2023-12-01
Payer: MEDICARE

## 2023-12-01 VITALS
DIASTOLIC BLOOD PRESSURE: 70 MMHG | OXYGEN SATURATION: 98 % | WEIGHT: 158 LBS | BODY MASS INDEX: 29.08 KG/M2 | HEIGHT: 62 IN | HEART RATE: 71 BPM | SYSTOLIC BLOOD PRESSURE: 110 MMHG

## 2023-12-01 DIAGNOSIS — E78.2 MIXED HYPERLIPIDEMIA: ICD-10-CM

## 2023-12-01 DIAGNOSIS — I10 PRIMARY HYPERTENSION: ICD-10-CM

## 2023-12-01 DIAGNOSIS — J98.4 RESTRICTIVE LUNG DISEASE: ICD-10-CM

## 2023-12-01 DIAGNOSIS — I31.39 PERICARDIAL EFFUSION: ICD-10-CM

## 2023-12-01 DIAGNOSIS — I50.30 HEART FAILURE WITH PRESERVED EJECTION FRACTION, BORDERLINE, CLASS II: Primary | ICD-10-CM

## 2023-12-01 PROBLEM — R42 EPISODIC LIGHTHEADEDNESS: Status: RESOLVED | Noted: 2019-06-17 | Resolved: 2023-12-01

## 2023-12-01 PROBLEM — R06.09 EXERTIONAL DYSPNEA: Status: RESOLVED | Noted: 2023-03-17 | Resolved: 2023-12-01

## 2023-12-01 PROBLEM — Z09 HOSPITAL DISCHARGE FOLLOW-UP: Status: RESOLVED | Noted: 2023-03-21 | Resolved: 2023-12-01

## 2023-12-01 NOTE — PROGRESS NOTES
CARDIOLOGY    Date of Office Visit: 2023  Patient Name: Estela Joshua  : 1940  Encounter Provider: Reynaldo Dye PA-C  Primary Cardiologist: Ranulfo Kirby MD    CHIEF COMPLAINT / REASON FOR OFFICE VISIT     6 month follow up      HISTORY OF PRESENT ILLNESS     This is a 83 y.o. year old female who presents to [facility] CARDIOLOGY for a for a 6 month follow up.     She has a history of an abnormal coronary artery calcium as seen on prior CT.  This has been noted in the LAD region.  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  Her most recent stress test in 2023 was normal without any acute abnormalities.    She has a longstanding history of chronic heart failure with preserved EF.  Earlier this year, she had a CT of the chest completed that showed small to moderate pericardial effusion.  This was not seen on follow up echocardiogram.  She has been maintained on Lasix and spironolactone.  At her last office visit, her amlodipine was decreased to once daily due to hypotension at home.    She does follow-up intermittently with pulmonology.  I reviewed her pulmonary function testing from back in 2023 showing normal lung volumes and flow loops.    Today the patient reports she has been doing fairly well over the past few months.  She is struggling with significant arthritis and particularly in her right knee.  It is limiting her mobility and she has a hard time going up stairs.  She has ongoing chronic dyspnea with severe exertion.  It is from the same caliber over the last 20 years.  Blood pressure has been well controlled with readings into the 120s over 70s at home.  She denies any episodes of dizziness or lightheadedness.  She has had 2 falls in the last year that were unrelated to presyncopal symptoms and primarily due to mechanical falls.  She is following up with her PCP in January to discuss her options for  "her arthritic pain.    PMHx:  HTN, HL, heart failure with preserved EF, pericardial effusion, vitamin D deficiency, GERD, osteopenia, restrictive lung disease    PHYSICAL EXAMINATION     Vital Signs:  /70   Pulse 71   Ht 157.5 cm (62.01\")   Wt 71.7 kg (158 lb)   SpO2 98%   BMI 28.89 kg/m²   Estimated body mass index is 28.89 kg/m² as calculated from the following:    Height as of this encounter: 157.5 cm (62.01\").    Weight as of this encounter: 71.7 kg (158 lb).               Physical Exam  Constitutional:       Appearance: Normal appearance.      Comments: Elderly female   HENT:      Head: Normocephalic and atraumatic.   Cardiovascular:      Rate and Rhythm: Normal rate and regular rhythm.      Pulses: Normal pulses.      Heart sounds: Normal heart sounds.   Pulmonary:      Effort: Pulmonary effort is normal.      Breath sounds: Normal breath sounds.   Musculoskeletal:      Right lower leg: No edema.      Left lower leg: No edema.   Skin:     General: Skin is warm and dry.   Neurological:      General: No focal deficit present.      Mental Status: She is alert and oriented to person, place, and time.          Cardiac Testing/Results     Cardiac Testing:   - Echo 3/18/2023: EF 68.5% with grade 2 diastolic dysfunction.  Sclerotic aortic valve with mild aortic valve regurgitation.  Mild MR.  Mild to moderate TR with RVSP 40 mmHg.  Mild pulmonary hypertension.    -Lexiscan stress test 3/19/2023: Hyperdynamic ejection fraction 70%.  No evidence of myocardial perfusion deficits.    I reviewed prior vascular screening bundle 5/10/2022 showing less than 50% stenosis bilateral ICA, normal aortic aneurysm size and normal ABIs bilaterally in lower extremities.    Result Review :  The following data was reviewed by: Reynaldo Dye PA-C on 12/01/2023:    Lipid Panel          4/10/2023    10:02 6/14/2023    08:27   Lipid Panel   Total Cholesterol 167     Total Cholesterol  177    Triglycerides 156  164    HDL " Cholesterol 45  45    VLDL Cholesterol 27  29    LDL Cholesterol  95  103    LDL/HDL Ratio 2.02  2.3       Lab Results   Component Value Date     06/14/2023     05/16/2023    K 4.6 06/14/2023    K 4.2 05/16/2023     06/14/2023     05/16/2023    CO2 21 06/14/2023    CO2 26.0 05/16/2023    BUN 32 (H) 06/14/2023    BUN 35 (H) 05/16/2023    CREATININE 1.01 (H) 06/14/2023    CREATININE 0.92 05/16/2023    EGFRIFNONA 78 10/19/2021    EGFRIFNONA 85 03/30/2021    EGFRIFAFRI 90 10/19/2021    EGFRIFAFRI 103 03/30/2021    GLUCOSE 89 06/14/2023    GLUCOSE 134 (H) 05/16/2023    CALCIUM 9.7 06/14/2023    CALCIUM 9.9 05/16/2023    ALBUMIN 4.5 06/14/2023    ALBUMIN 4.3 04/10/2023    AST 21 06/14/2023    AST 22 04/10/2023    ALT 22 06/14/2023    ALT 22 04/10/2023     Lab Results   Component Value Date    WBC 6.8 06/14/2023    WBC 10.17 04/10/2023    HGB 13.2 06/14/2023    HGB 13.4 04/10/2023    HCT 39.0 06/14/2023    HCT 40.7 04/10/2023    MCV 94 06/14/2023    MCV 93.1 04/10/2023     06/14/2023     04/10/2023     Lab Results   Component Value Date    PROBNP 502.0 03/18/2023    PROBNP 502.0 03/17/2023     Lab Results   Component Value Date    TROPONINT 12 (H) 03/17/2023     Lab Results   Component Value Date    TSH 3.270 06/14/2023    TSH 2.370 04/10/2023                          ASSESSMENT & PLAN       Diagnoses and all orders for this visit:    1. Heart failure with preserved ejection fraction, borderline, class II (Primary)  Recent echocardiogram with EF of 69% with grade 1 diastolic dysfunction.  Continue spironolactone 25 mg daily and furosemide 20 mg daily  Most recent stress test with no acute ischemia.  Had prior heart catheterization showing mild defect to the RCA with no significant stenosis.  Continue blood pressure monitoring for blood pressure control at home  Limit sodium  2. Mixed hyperlipidemia  Continue atorvastatin 40 mg daily.  Her goal LDL would be less than 100.  Most recent  was 103.  Discussed diet and lifestyle modifications, activity has been limited due to arthritis  3. Primary hypertension  Keep blood pressure monitoring.  Hypotension has resolved.  Continue amlodipine 2.5 mg daily and diuretics  Pressure has been well controlled at home.    4. Restrictive lung disease  Follows with pulmonology.  Sleep study has been recommended but not completed  I reviewed most recent pulmonary function testing with no acute abnormalities  5. Pericardial effusion  Noted on prior CT in March.  Not seen on follow-up echocardiogram      Follow Up:  Return in about 6 months (around 6/1/2024) for Dr. Kirby-Jenifer.  Patient was given instructions and counseling regarding her condition or for health maintenance advice. Please contact office if worsening symptoms or proceed to ER when appropriate.      Reynaldo Dye PA-C  12/01/23  14:15 EST    MEDICATIONS         Discharge Medications            Accurate as of December 1, 2023  2:15 PM. If you have any questions, ask your nurse or doctor.                Continue These Medications        Instructions Start Date   albuterol sulfate  (90 Base) MCG/ACT inhaler  Commonly known as: PROVENTIL HFA;VENTOLIN HFA;PROAIR HFA   2 puffs, Inhalation, Every 6 Hours PRN      alendronate 70 MG tablet  Commonly known as: Fosamax   70 mg, Oral, Every 7 Days      amLODIPine 2.5 MG tablet  Commonly known as: NORVASC   2.5 mg, Oral, Daily      aspirin 81 MG EC tablet   81 mg, Oral, Daily      atorvastatin 40 MG tablet  Commonly known as: LIPITOR   40 mg, Oral, Nightly      Besivance 0.6 % suspension ophthalmic suspension  Generic drug: besifloxacin   SHAKE LQ AND INT 1 GTT IN OD BID      budesonide-formoterol 80-4.5 MCG/ACT inhaler  Commonly known as: Symbicort   2 puffs, Inhalation, 2 Times Daily - RT      diclofenac 75 MG EC tablet  Commonly known as: VOLTAREN   75 mg, Oral, 2 Times Daily With Meals      EYE VITAMINS PO   Oral      fluticasone 50 MCG/ACT  nasal spray  Commonly known as: FLONASE   Use 1 spray in each nostril morning and evening      furosemide 20 MG tablet  Commonly known as: LASIX   TAKE ONE TABLET BY MOUTH DAILY      GLUCOSAMINE PO   50 mg, Oral, Daily      pantoprazole 40 MG EC tablet  Commonly known as: PROTONIX   40 mg, Oral, Daily      sertraline 100 MG tablet  Commonly known as: ZOLOFT   100 mg, Oral, Daily      spironolactone 25 MG tablet  Commonly known as: ALDACTONE   25 mg, Oral, Daily                   **Dieudonne Disclaimer: This note was dictated using an electronic transcription. The electronic translation of spoken language may permit erroneous, or at times, nonsensical words or phrases to be inadvertently transcribed. Although I have reviewed the note for such errors, some may still exist.

## 2023-12-11 RX ORDER — DICLOFENAC SODIUM 75 MG/1
75 TABLET, DELAYED RELEASE ORAL 2 TIMES DAILY WITH MEALS
Qty: 180 TABLET | Refills: 1 | Status: SHIPPED | OUTPATIENT
Start: 2023-12-11

## 2023-12-28 ENCOUNTER — TELEPHONE (OUTPATIENT)
Dept: FAMILY MEDICINE CLINIC | Facility: CLINIC | Age: 83
End: 2023-12-28

## 2023-12-28 DIAGNOSIS — I10 PRIMARY HYPERTENSION: ICD-10-CM

## 2023-12-28 DIAGNOSIS — E55.9 VITAMIN D DEFICIENCY: ICD-10-CM

## 2023-12-28 DIAGNOSIS — E78.2 MIXED HYPERLIPIDEMIA: Primary | ICD-10-CM

## 2023-12-28 NOTE — TELEPHONE ENCOUNTER
Caller: Estela Joshua    Relationship: Self    Best call back number: [phone]     What is the best time to reach you: ANY    Who are you requesting to speak with (clinical staff, provider,  specific staff member): CLINICAL STAFF    Do you know the name of the person who called:      What was the call regarding: PATIENT CALLED AND HAS APPOINTMENT FOR MEDICARE WELLNESS CHECK ON 1/04/24 AND SHE NEEDS TO KNOW IF SHE NEEDS ANY LABS NEED DONE AND IF THEY ARE FASTING LABS.  PLEASE LET HER KNOW AS SOON AS POSSIBLE.      Is it okay if the provider responds through MyChart:

## 2023-12-29 LAB
ALBUMIN SERPL-MCNC: 4.6 G/DL (ref 3.5–5.2)
ALBUMIN/GLOB SERPL: 2 G/DL
ALP SERPL-CCNC: 34 U/L (ref 39–117)
ALT SERPL-CCNC: 20 U/L (ref 1–33)
AST SERPL-CCNC: 18 U/L (ref 1–32)
BASOPHILS # BLD AUTO: 0.03 10*3/MM3 (ref 0–0.2)
BASOPHILS NFR BLD AUTO: 0.3 % (ref 0–1.5)
BILIRUB SERPL-MCNC: 0.4 MG/DL (ref 0–1.2)
BUN SERPL-MCNC: 20 MG/DL (ref 8–23)
BUN/CREAT SERPL: 23.3 (ref 7–25)
CALCIUM SERPL-MCNC: 9.4 MG/DL (ref 8.6–10.5)
CHLORIDE SERPL-SCNC: 108 MMOL/L (ref 98–107)
CHOLEST SERPL-MCNC: 149 MG/DL (ref 0–200)
CO2 SERPL-SCNC: 26.1 MMOL/L (ref 22–29)
CREAT SERPL-MCNC: 0.86 MG/DL (ref 0.57–1)
EGFRCR SERPLBLD CKD-EPI 2021: 67.1 ML/MIN/1.73
EOSINOPHIL # BLD AUTO: 0.08 10*3/MM3 (ref 0–0.4)
EOSINOPHIL NFR BLD AUTO: 0.8 % (ref 0.3–6.2)
ERYTHROCYTE [DISTWIDTH] IN BLOOD BY AUTOMATED COUNT: 12.7 % (ref 12.3–15.4)
GLOBULIN SER CALC-MCNC: 2.3 GM/DL
GLUCOSE SERPL-MCNC: 90 MG/DL (ref 65–99)
HCT VFR BLD AUTO: 38.2 % (ref 34–46.6)
HDLC SERPL-MCNC: 47 MG/DL (ref 40–60)
HGB BLD-MCNC: 12.9 G/DL (ref 12–15.9)
IMM GRANULOCYTES # BLD AUTO: 0.03 10*3/MM3 (ref 0–0.05)
IMM GRANULOCYTES NFR BLD AUTO: 0.3 % (ref 0–0.5)
LDLC SERPL CALC-MCNC: 78 MG/DL (ref 0–100)
LDLC/HDLC SERPL: 1.59 {RATIO}
LYMPHOCYTES # BLD AUTO: 1.97 10*3/MM3 (ref 0.7–3.1)
LYMPHOCYTES NFR BLD AUTO: 20.9 % (ref 19.6–45.3)
MCH RBC QN AUTO: 31.5 PG (ref 26.6–33)
MCHC RBC AUTO-ENTMCNC: 33.8 G/DL (ref 31.5–35.7)
MCV RBC AUTO: 93.4 FL (ref 79–97)
MONOCYTES # BLD AUTO: 0.58 10*3/MM3 (ref 0.1–0.9)
MONOCYTES NFR BLD AUTO: 6.2 % (ref 5–12)
NEUTROPHILS # BLD AUTO: 6.73 10*3/MM3 (ref 1.7–7)
NEUTROPHILS NFR BLD AUTO: 71.5 % (ref 42.7–76)
NRBC BLD AUTO-RTO: 0 /100 WBC (ref 0–0.2)
PLATELET # BLD AUTO: 198 10*3/MM3 (ref 140–450)
POTASSIUM SERPL-SCNC: 4.5 MMOL/L (ref 3.5–5.2)
PROT SERPL-MCNC: 6.9 G/DL (ref 6–8.5)
RBC # BLD AUTO: 4.09 10*6/MM3 (ref 3.77–5.28)
SODIUM SERPL-SCNC: 143 MMOL/L (ref 136–145)
TRIGL SERPL-MCNC: 136 MG/DL (ref 0–150)
VLDLC SERPL CALC-MCNC: 24 MG/DL (ref 5–40)
WBC # BLD AUTO: 9.42 10*3/MM3 (ref 3.4–10.8)

## 2024-01-04 ENCOUNTER — OFFICE VISIT (OUTPATIENT)
Dept: FAMILY MEDICINE CLINIC | Facility: CLINIC | Age: 84
End: 2024-01-04
Payer: MEDICARE

## 2024-01-04 VITALS
SYSTOLIC BLOOD PRESSURE: 148 MMHG | DIASTOLIC BLOOD PRESSURE: 62 MMHG | BODY MASS INDEX: 28.3 KG/M2 | WEIGHT: 153.8 LBS | OXYGEN SATURATION: 97 % | HEIGHT: 62 IN | HEART RATE: 67 BPM

## 2024-01-04 DIAGNOSIS — E78.2 MIXED HYPERLIPIDEMIA: ICD-10-CM

## 2024-01-04 DIAGNOSIS — M85.80 OSTEOPENIA, UNSPECIFIED LOCATION: ICD-10-CM

## 2024-01-04 DIAGNOSIS — M25.50 ARTHRALGIA, UNSPECIFIED JOINT: ICD-10-CM

## 2024-01-04 DIAGNOSIS — I50.30 HEART FAILURE WITH PRESERVED EJECTION FRACTION, BORDERLINE, CLASS II: ICD-10-CM

## 2024-01-04 DIAGNOSIS — K21.9 GASTROESOPHAGEAL REFLUX DISEASE, UNSPECIFIED WHETHER ESOPHAGITIS PRESENT: ICD-10-CM

## 2024-01-04 DIAGNOSIS — J30.9 ALLERGIC RHINITIS, UNSPECIFIED SEASONALITY, UNSPECIFIED TRIGGER: ICD-10-CM

## 2024-01-04 DIAGNOSIS — I10 PRIMARY HYPERTENSION: Primary | ICD-10-CM

## 2024-01-04 NOTE — PROGRESS NOTES
Juan Manuel Joshua is a 83 y.o. female. Patient is here today for   Chief Complaint   Patient presents with    Medicare Wellness-subsequent          Vitals:    24 1112   BP: 148/62   Pulse: 67   SpO2: 97%     Body mass index is 28.12 kg/m².    The following portions of the patient's history were reviewed and updated as appropriate: allergies, current medications, past family history, past medical history, past social history, past surgical history and problem list.    Past Medical History:   Diagnosis Date    COPD (chronic obstructive pulmonary disease)     Depression     GERD (gastroesophageal reflux disease)     Hyperglycemia     Hyperlipidemia     Hypertension     Macular degeneration     Osteoarthritis       No Known Allergies   Social History     Socioeconomic History    Marital status:    Tobacco Use    Smoking status: Former     Packs/day: 1.00     Years: 20.00     Additional pack years: 0.00     Total pack years: 20.00     Types: Cigarettes     Quit date:      Years since quittin.0    Smokeless tobacco: Never   Vaping Use    Vaping Use: Never used   Substance and Sexual Activity    Alcohol use: Yes     Alcohol/week: 2.0 standard drinks of alcohol     Types: 1 Glasses of wine, 1 Cans of beer per week    Drug use: No    Sexual activity: Defer        Current Outpatient Medications:     albuterol sulfate  (90 Base) MCG/ACT inhaler, Inhale 2 puffs Every 6 (Six) Hours As Needed for Wheezing., Disp: 18 g, Rfl: 5    alendronate (Fosamax) 70 MG tablet, Take 1 tablet by mouth Every 7 (Seven) Days., Disp: 12 tablet, Rfl: 3    amLODIPine (NORVASC) 2.5 MG tablet, Take 1 tablet by mouth Daily., Disp: 90 tablet, Rfl: 3    aspirin 81 MG EC tablet, Take 1 tablet by mouth Daily., Disp: , Rfl:     atorvastatin (LIPITOR) 40 MG tablet, Take 1 tablet by mouth Every Night., Disp: 90 tablet, Rfl: 3    BESIVANCE 0.6 % suspension ophthalmic suspension, SHAKE LQ AND INT 1 GTT IN OD BID, Disp: , Rfl:  1    budesonide-formoterol (Symbicort) 80-4.5 MCG/ACT inhaler, Inhale 2 puffs 2 (Two) Times a Day., Disp: 3 each, Rfl: 3    diclofenac (VOLTAREN) 75 MG EC tablet, TAKE ONE TABLET BY MOUTH TWICE A DAY WITH MEALS, Disp: 180 tablet, Rfl: 1    fluticasone (FLONASE) 50 MCG/ACT nasal spray, Use 1 spray in each nostril morning and evening, Disp: 16 g, Rfl: 5    furosemide (LASIX) 20 MG tablet, TAKE ONE TABLET BY MOUTH DAILY, Disp: 90 tablet, Rfl: 1    Glucosamine HCl (GLUCOSAMINE PO), Take 50 mg by mouth Daily., Disp: , Rfl:     Multiple Vitamins-Minerals (EYE VITAMINS PO), Take  by mouth., Disp: , Rfl:     pantoprazole (PROTONIX) 40 MG EC tablet, Take 1 tablet by mouth Daily., Disp: 90 tablet, Rfl: 3    sertraline (ZOLOFT) 100 MG tablet, Take 1 tablet by mouth Daily., Disp: 90 tablet, Rfl: 3    spironolactone (ALDACTONE) 25 MG tablet, TAKE 1 TABLET BY MOUTH DAILY, Disp: 90 tablet, Rfl: 2     Objective     History of Present Illness     Review of Systems    Physical Exam    Assessment    ASSESSMENT    Problems Addressed this Visit    None  Diagnoses    None.         PLAN  There are no Patient Instructions on file for this visit.  No follow-ups on file.

## 2024-01-04 NOTE — PROGRESS NOTES
"The ABCs of the Annual Wellness Visit  Subsequent Medicare Wellness Visit    Subjective    Estela Joshua is a 83 y.o. female who presents for a Subsequent Medicare Wellness Visit.    The following portions of the patient's history were reviewed and   updated as appropriate: {history reviewed:20406::\"allergies\",\"current medications\",\"past family history\",\"past medical history\",\"past social history\",\"past surgical history\",\"problem list\"}.    Compared to one year ago, the patient feels her physical   health is {better worse same:25993}.    Compared to one year ago, the patient feels her mental   health is {better worse same:55872}.    Recent Hospitalizations:  This patient has had a [facility] admission record on file within the last 365 days.    Current Medical Providers:  Patient Care Team:  Robert Miner MD as PCP - General  Robert Miner MD as PCP - Family Medicine    Outpatient Medications Prior to Visit   Medication Sig Dispense Refill    albuterol sulfate  (90 Base) MCG/ACT inhaler Inhale 2 puffs Every 6 (Six) Hours As Needed for Wheezing. 18 g 5    alendronate (Fosamax) 70 MG tablet Take 1 tablet by mouth Every 7 (Seven) Days. 12 tablet 3    amLODIPine (NORVASC) 2.5 MG tablet Take 1 tablet by mouth Daily. 90 tablet 3    aspirin 81 MG EC tablet Take 1 tablet by mouth Daily.      atorvastatin (LIPITOR) 40 MG tablet Take 1 tablet by mouth Every Night. 90 tablet 3    BESIVANCE 0.6 % suspension ophthalmic suspension SHAKE LQ AND INT 1 GTT IN OD BID  1    budesonide-formoterol (Symbicort) 80-4.5 MCG/ACT inhaler Inhale 2 puffs 2 (Two) Times a Day. 3 each 3    diclofenac (VOLTAREN) 75 MG EC tablet TAKE ONE TABLET BY MOUTH TWICE A DAY WITH MEALS 180 tablet 1    fluticasone (FLONASE) 50 MCG/ACT nasal spray Use 1 spray in each nostril morning and evening 16 g 5    furosemide (LASIX) 20 MG tablet TAKE ONE TABLET BY MOUTH DAILY 90 tablet 1    Glucosamine HCl (GLUCOSAMINE PO) Take 50 mg by mouth Daily. " "     Multiple Vitamins-Minerals (EYE VITAMINS PO) Take  by mouth.      pantoprazole (PROTONIX) 40 MG EC tablet Take 1 tablet by mouth Daily. 90 tablet 3    sertraline (ZOLOFT) 100 MG tablet Take 1 tablet by mouth Daily. 90 tablet 3    spironolactone (ALDACTONE) 25 MG tablet TAKE 1 TABLET BY MOUTH DAILY 90 tablet 2     No facility-administered medications prior to visit.       No opioid medication identified on active medication list. I have reviewed chart for other potential  high risk medication/s and harmful drug interactions in the elderly.        Aspirin is on active medication list. {ASPIRIN ON MEDICATION LIST INDICATED/NOT INDICATED:44041}.      Patient Active Problem List   Diagnosis    Depression    Hyperlipidemia    Hypertension    Visual disturbance    Vitamin D deficiency    Imbalance    Impacted cerumen of right ear    Restrictive lung disease    Arthralgia    GERD (gastroesophageal reflux disease)    Arthritis of left knee    Arthritis of knee, right    Chronic bilateral low back pain without sciatica    Allergic rhinitis    Skin lesion    Osteopenia    Lung nodule    Pericardial effusion    Heart failure with preserved ejection fraction, borderline, class II     Advance Care Planning   Advance Care Planning     Advance Directive is not on file.  {ACP Discussion, Advance Directive not in EMR:47275}     Objective    Vitals:    01/04/24 1112   BP: 148/62   Pulse: 67   SpO2: 97%   Weight: 69.8 kg (153 lb 12.8 oz)   Height: 157.5 cm (62.01\")     Estimated body mass index is 28.12 kg/m² as calculated from the following:    Height as of this encounter: 157.5 cm (62.01\").    Weight as of this encounter: 69.8 kg (153 lb 12.8 oz).    {BMI is >= 25 and <30. (Overweight) The following options were offered after discussion;:8225650091}      Does the patient have evidence of cognitive impairment? {Yes/No:81218}    Lab Results   Component Value Date    CHLPL 149 12/28/2023    TRIG 136 12/28/2023    HDL 47 12/28/2023 "    LDL 78 2023    VLDL 24 2023        HEALTH RISK ASSESSMENT    Smoking Status:  Social History     Tobacco Use   Smoking Status Former    Packs/day: 1.00    Years: 20.00    Additional pack years: 0.00    Total pack years: 20.00    Types: Cigarettes    Quit date:     Years since quittin.0   Smokeless Tobacco Never     Alcohol Consumption:  Social History     Substance and Sexual Activity   Alcohol Use Yes    Alcohol/week: 2.0 standard drinks of alcohol    Types: 1 Glasses of wine, 1 Cans of beer per week     Fall Risk Screen:    STEADI Fall Risk Assessment has not been completed.    Depression Screenin/12/2022     4:20 PM   PHQ-2/PHQ-9 Depression Screening   Little Interest or Pleasure in Doing Things 0-->not at all   Feeling Down, Depressed or Hopeless 0-->not at all   PHQ-9: Brief Depression Severity Measure Score 0       Health Habits and Functional and Cognitive Screenin/12/2022     4:22 PM   Functional & Cognitive Status   Do you have difficulty preparing food and eating? No   Do you have difficulty bathing yourself, getting dressed or grooming yourself? No   Do you have difficulty using the toilet? No   Do you have difficulty moving around from place to place? No   Do you have trouble with steps or getting out of a bed or a chair? No   Current Diet Well Balanced Diet   Dental Exam Up to date   Eye Exam Up to date   Exercise (times per week) 2 times per week   Current Exercises Include Walking   Do you need help using the phone?  No   Are you deaf or do you have serious difficulty hearing?  Yes   Do you need help to go to places out of walking distance? No   Do you need help shopping? No   Do you need help preparing meals?  No   Do you need help with housework?  No   Do you need help with laundry? No   Do you need help taking your medications? No   Do you need help managing money? No   Do you ever drive or ride in a car without wearing a seat belt? No   Have you felt  unusual stress, anger or loneliness in the last month? No   Who do you live with? Other   If you need help, do you have trouble finding someone available to you? No   Have you been bothered in the last four weeks by sexual problems? No   Do you have difficulty concentrating, remembering or making decisions? No       Age-appropriate Screening Schedule:  Refer to the list below for future screening recommendations based on patient's age, sex and/or medical conditions. Orders for these recommended tests are listed in the plan section. The patient has been provided with a written plan.    Health Maintenance   Topic Date Due    ANNUAL WELLNESS VISIT  12/12/2023    BMI FOLLOWUP  12/12/2023    LIPID PANEL  12/28/2024    DXA SCAN  06/19/2025    COLORECTAL CANCER SCREENING  09/01/2027    TDAP/TD VACCINES (2 - Td or Tdap) 09/01/2028    COVID-19 Vaccine  Completed    INFLUENZA VACCINE  Completed    Pneumococcal Vaccine 65+  Completed    ZOSTER VACCINE  Completed                  CMS Preventative Services Quick Reference  Risk Factors Identified During Encounter  {Medicare Wellness Risk Factors:43582}  The above risks/problems have been discussed with the patient.  Pertinent information has been shared with the patient in the After Visit Summary.  An After Visit Summary and PPPS were made available to the patient.    Follow Up:   Next Medicare Wellness visit to be scheduled in 1 year.   {Wrapup  Review (Popup)  Advance Care Planning  Labs  CC  Problem List  Visit Diagnosis  Medications  Result Review  Imaging  Health Maintenance  Quality  BestPractice  SmartSets  SnapShot  Encounters  Notes  Media  Procedures :23}    Additional E&M Note during same encounter follows:  Patient has multiple medical problems which are significant and separately identifiable that require additional work above and beyond the Medicare Wellness Visit.      Chief Complaint  Medicare Wellness-subsequent    Subjective    {Problem List   "Visit Diagnosis   Encounters  Notes  Medications  Labs  Result Review Imaging  Media :23}    HPI  Estela Joshua is also being seen today for ***         Objective   Vital Signs:  /62   Pulse 67   Ht 157.5 cm (62.01\")   Wt 69.8 kg (153 lb 12.8 oz)   SpO2 97%   BMI 28.12 kg/m²     Physical Exam     {The following data was reviewed by (Optional):67910}  {Ambulatory Labs (Optional):18409}  {Data reviewed (Optional):23017:::1}           Assessment and Plan {CC Problem List  Visit Diagnosis   ROS  Review (Popup)  Health Maintenance  Quality  BestPractice  Medications  SmartSets  SnapShot Encounters  Media :23}  There are no diagnoses linked to this encounter.       {Time Spent (Optional):17753}  Follow Up {Instructions Charge Capture  Follow-up Communications :23}  No follow-ups on file.  Patient was given instructions and counseling regarding her condition or for health maintenance advice. Please see specific information pulled into the AVS if appropriate.           "

## 2024-01-04 NOTE — PROGRESS NOTES
The ABCs of the Annual Wellness Visit  Subsequent Medicare Wellness Visit    Juan Manuel Joshua is a 83 y.o. female who presents for a Subsequent Medicare Wellness Visit.    The following portions of the patient's history were reviewed and   updated as appropriate: allergies, current medications, past family history, past medical history, past social history, past surgical history, and problem list.    Compared to one year ago, the patient feels her physical   health is the same.    Compared to one year ago, the patient feels her mental   health is worse.    Recent Hospitalizations:  This patient has had a [facility] [place] admission record on file within the last 365 days.    Current Medical Providers:  Patient Care Team:  Robert Miner MD as PCP - General  Robert Miner MD as PCP - Family Medicine    Outpatient Medications Prior to Visit   Medication Sig Dispense Refill    albuterol sulfate  (90 Base) MCG/ACT inhaler Inhale 2 puffs Every 6 (Six) Hours As Needed for Wheezing. 18 g 5    alendronate (Fosamax) 70 MG tablet Take 1 tablet by mouth Every 7 (Seven) Days. 12 tablet 3    amLODIPine (NORVASC) 2.5 MG tablet Take 1 tablet by mouth Daily. 90 tablet 3    aspirin 81 MG EC tablet Take 1 tablet by mouth Daily.      atorvastatin (LIPITOR) 40 MG tablet Take 1 tablet by mouth Every Night. 90 tablet 3    BESIVANCE 0.6 % suspension ophthalmic suspension SHAKE LQ AND INT 1 GTT IN OD BID  1    budesonide-formoterol (Symbicort) 80-4.5 MCG/ACT inhaler Inhale 2 puffs 2 (Two) Times a Day. 3 each 3    diclofenac (VOLTAREN) 75 MG EC tablet TAKE ONE TABLET BY MOUTH TWICE A DAY WITH MEALS 180 tablet 1    fluticasone (FLONASE) 50 MCG/ACT nasal spray Use 1 spray in each nostril morning and evening 16 g 5    furosemide (LASIX) 20 MG tablet TAKE ONE TABLET BY MOUTH DAILY 90 tablet 1    Glucosamine HCl (GLUCOSAMINE PO) Take 50 mg by mouth Daily.      Multiple Vitamins-Minerals (EYE VITAMINS PO) Take  by mouth.   "    pantoprazole (PROTONIX) 40 MG EC tablet Take 1 tablet by mouth Daily. 90 tablet 3    sertraline (ZOLOFT) 100 MG tablet Take 1 tablet by mouth Daily. 90 tablet 3    spironolactone (ALDACTONE) 25 MG tablet TAKE 1 TABLET BY MOUTH DAILY 90 tablet 2     No facility-administered medications prior to visit.       No opioid medication identified on active medication list. I have reviewed chart for other potential  high risk medication/s and harmful drug interactions in the elderly.        Aspirin is on active medication list. Aspirin use is indicated based on review of current medical condition/s. Pros and cons of this therapy have been discussed today. Benefits of this medication outweigh potential harm.  Patient has been encouraged to continue taking this medication.  .      Patient Active Problem List   Diagnosis    Depression    Hyperlipidemia    Hypertension    Visual disturbance    Vitamin D deficiency    Imbalance    Impacted cerumen of right ear    Restrictive lung disease    Arthralgia    GERD (gastroesophageal reflux disease)    Arthritis of left knee    Arthritis of knee, right    Chronic bilateral low back pain without sciatica    Allergic rhinitis    Skin lesion    Osteopenia    Lung nodule    Pericardial effusion    Heart failure with preserved ejection fraction, borderline, class II     Advance Care Planning   Advance Care Planning     Advance Directive is not on file.  ACP discussion was held with the patient during this visit. Patient has an advance directive (not in EMR), copy requested.     Objective    Vitals:    01/04/24 1112   BP: 148/62   Pulse: 67   SpO2: 97%   Weight: 69.8 kg (153 lb 12.8 oz)   Height: 157.5 cm (62.01\")     Estimated body mass index is 28.12 kg/m² as calculated from the following:    Height as of this encounter: 157.5 cm (62.01\").    Weight as of this encounter: 69.8 kg (153 lb 12.8 oz).    BMI is >= 25 and <30. (Overweight) The following options were offered after discussion;: " exercise counseling/recommendations and nutrition counseling/recommendations      Does the patient have evidence of cognitive impairment? No    Lab Results   Component Value Date    CHLPL 149 2023    TRIG 136 2023    HDL 47 2023    LDL 78 2023    VLDL 24 2023        HEALTH RISK ASSESSMENT    Smoking Status:  Social History     Tobacco Use   Smoking Status Former    Packs/day: 1.00    Years: 20.00    Additional pack years: 0.00    Total pack years: 20.00    Types: Cigarettes    Quit date:     Years since quittin.0   Smokeless Tobacco Never     Alcohol Consumption:  Social History     Substance and Sexual Activity   Alcohol Use Yes    Alcohol/week: 2.0 standard drinks of alcohol    Types: 1 Glasses of wine, 1 Cans of beer per week     Fall Risk Screen:    NELLA Fall Risk Assessment was completed, and patient is at LOW risk for falls.Assessment completed on:2024    Depression Screenin/4/2024    11:32 AM   PHQ-2/PHQ-9 Depression Screening   Little Interest or Pleasure in Doing Things 0-->not at all   Feeling Down, Depressed or Hopeless 0-->not at all   PHQ-9: Brief Depression Severity Measure Score 0       Health Habits and Functional and Cognitive Screenin/4/2024    11:32 AM   Functional & Cognitive Status   Do you have difficulty preparing food and eating? No   Do you have difficulty bathing yourself, getting dressed or grooming yourself? No   Do you have difficulty using the toilet? No   Do you have difficulty moving around from place to place? No   Do you have trouble with steps or getting out of a bed or a chair? Yes   Do you need help using the phone?  Yes   Are you deaf or do you have serious difficulty hearing?  No   Do you need help to go to places out of walking distance? No   Do you need help shopping? No   Do you need help preparing meals?  No   Do you need help with housework?  No   Do you need help with laundry? No   Do you need help taking your  medications? No   Do you need help managing money? No   Do you ever drive or ride in a car without wearing a seat belt? No   Have you felt unusual stress, anger or loneliness in the last month? No   Who do you live with? Child   If you need help, do you have trouble finding someone available to you? No   Have you been bothered in the last four weeks by sexual problems? No   Do you have difficulty concentrating, remembering or making decisions? Yes       Age-appropriate Screening Schedule:  Refer to the list below for future screening recommendations based on patient's age, sex and/or medical conditions. Orders for these recommended tests are listed in the plan section. The patient has been provided with a written plan.    Health Maintenance   Topic Date Due    ANNUAL WELLNESS VISIT  12/12/2023    BMI FOLLOWUP  12/12/2023    LIPID PANEL  12/28/2024    DXA SCAN  06/19/2025    COLORECTAL CANCER SCREENING  09/01/2027    TDAP/TD VACCINES (2 - Td or Tdap) 09/01/2028    COVID-19 Vaccine  Completed    INFLUENZA VACCINE  Completed    Pneumococcal Vaccine 65+  Completed    ZOSTER VACCINE  Completed                  CMS Preventative Services Quick Reference  Risk Factors Identified During Encounter  None Identified  The above risks/problems have been discussed with the patient.  Pertinent information has been shared with the patient in the After Visit Summary.  An After Visit Summary and PPPS were made available to the patient.    Follow Up:   Next Medicare Wellness visit to be scheduled in 1 year.       Additional E&M Note during same encounter follows:  Patient has multiple medical problems which are significant and separately identifiable that require additional work above and beyond the Medicare Wellness Visit.      Chief Complaint  Medicare Wellness-subsequent    Subjective        HPI  Estela Joshua is also being seen today for blood pressure check and lab follow-up.  She has hypertension, hyperlipidemia, stable diastolic  "heart failure, gastroesophageal reflux, stable asthma, osteopenia, depression in remission.  She generally feels well.  She eats healthy and stays physically active but does not formally exercise.  She monitors her blood pressure reports stable readings.  She states that she left a coffee pot on the stove until was smoking and also left the car running in a parking lot when she took her great granddaughter to the restroom.      Review of Systems   Constitutional:         5 pound weight loss   Eyes:         Has macular degeneration   Respiratory: Negative.     Cardiovascular: Negative.    Gastrointestinal: Negative.    Genitourinary: Negative.    Neurological:  Negative for confusion.   Psychiatric/Behavioral:  Negative for dysphoric mood.        Objective   Vital Signs:  /62   Pulse 67   Ht 157.5 cm (62.01\")   Wt 69.8 kg (153 lb 12.8 oz)   SpO2 97%   BMI 28.12 kg/m²     Physical Exam  Vitals reviewed.   Constitutional:       Appearance: Normal appearance.   Neck:      Vascular: No carotid bruit.   Cardiovascular:      Rate and Rhythm: Normal rate and regular rhythm.      Heart sounds: Normal heart sounds.      Comments: 110/;60  Pulmonary:      Effort: Pulmonary effort is normal.      Breath sounds: Normal breath sounds.   Musculoskeletal:      Right lower leg: No edema.      Left lower leg: No edema.   Neurological:      Mental Status: She is alert.   Psychiatric:         Mood and Affect: Mood normal.         Behavior: Behavior normal.         Thought Content: Thought content normal.         Judgment: Judgment normal.                         Assessment and Plan blood pressure is normal when checked correctly by me today and also normal by your readings.  A complete blood count, comprehensive metabolic panel, and lipid panel are all normal.  Continue healthy heart diet and current medicines.  Mini-Mental status examination score is 30 out of 30.  Diagnoses and all orders for this visit:    1. Primary " hypertension (Primary)    2. Mixed hyperlipidemia    3. Gastroesophageal reflux disease, unspecified whether esophagitis present    4. Arthralgia, unspecified joint    5. Osteopenia, unspecified location    6. Heart failure with preserved ejection fraction, borderline, class II    7. Allergic rhinitis, unspecified seasonality, unspecified trigger             Follow Up   No follow-ups on file.  Patient was given instructions and counseling regarding her condition or for health maintenance advice. Please see specific information pulled into the AVS if appropriate.

## 2024-02-23 RX ORDER — ATORVASTATIN CALCIUM 40 MG/1
40 TABLET, FILM COATED ORAL NIGHTLY
Qty: 90 TABLET | Refills: 3 | Status: SHIPPED | OUTPATIENT
Start: 2024-02-23

## 2024-02-23 RX ORDER — SERTRALINE HYDROCHLORIDE 100 MG/1
100 TABLET, FILM COATED ORAL DAILY
Qty: 90 TABLET | Refills: 3 | Status: SHIPPED | OUTPATIENT
Start: 2024-02-23

## 2024-03-14 ENCOUNTER — TELEPHONE (OUTPATIENT)
Dept: FAMILY MEDICINE CLINIC | Facility: CLINIC | Age: 84
End: 2024-03-14
Payer: MEDICARE

## 2024-03-14 NOTE — TELEPHONE ENCOUNTER
Pharmacy Name: [facility] [id number] [place], [place] - [address] [address] AT [facility] [phone] [facility] [phone]      Reference Number (if applicable): N/A    Pharmacy representative name: ELIZABETH    Pharmacy representative phone number: [phone]    What medication are you calling in regards to: furosemide (LASIX) 20 MG tablet    What question does the pharmacy have: PHARMACY IS WANTING TO KNOW IF MEDICATION IS STILL BEING PRESCRIBED AND IF SO TO PLEASE SEND OVER A PRESCRIPTION    Who is the provider that prescribed the medication: DR DOUGLASS    Additional notes:

## 2024-03-15 RX ORDER — FLUTICASONE PROPIONATE 50 MCG
SPRAY, SUSPENSION (ML) NASAL
Qty: 16 ML | Refills: 1 | Status: SHIPPED | OUTPATIENT
Start: 2024-03-15

## 2024-03-22 RX ORDER — FUROSEMIDE 20 MG/1
20 TABLET ORAL DAILY
Qty: 90 TABLET | Refills: 1 | Status: SHIPPED | OUTPATIENT
Start: 2024-03-22

## 2024-03-22 NOTE — TELEPHONE ENCOUNTER
Rx Refill Note  Requested Prescriptions     Pending Prescriptions Disp Refills    furosemide (LASIX) 20 MG tablet 90 tablet 1     Sig: Take 1 tablet by mouth Daily.      Last office visit with prescribing clinician: 1/4/2024   Last telemedicine visit with prescribing clinician: Visit date not found   Next office visit with prescribing clinician: 7/11/2024                         Would you like a call back once the refill request has been completed: [] Yes [] No    If the office needs to give you a call back, can they leave a voicemail: [] Yes [] No    Blanca Romero MA  03/22/24, 16:34 EDT

## 2024-04-01 RX ORDER — ALENDRONATE SODIUM 70 MG/1
70 TABLET ORAL WEEKLY
Qty: 12 TABLET | Refills: 3 | Status: SHIPPED | OUTPATIENT
Start: 2024-04-01

## 2024-05-03 ENCOUNTER — PATIENT ROUNDING (BHMG ONLY) (OUTPATIENT)
Dept: URGENT CARE | Facility: CLINIC | Age: 84
End: 2024-05-03
Payer: MEDICARE

## 2024-05-03 NOTE — ED NOTES
Thank you for letting us care for you at your recent visit to [facility]. We would love to hear about your experience with us. Was this the first time you have visited our location?    We’re always looking for ways to make our patients’ experience even better. Do you have any recommendations on ways we may improve?     Please be on the lookout for a survey about your recent visit from Nadia Owen via text or email. We would greatly appreciate if you could fill that out and turn it back in. We want your voice to be heard and we value your feedback.     Thank you for choosing [facility] for your healthcare needs. I appreciate you taking the time to respond!

## 2024-05-07 ENCOUNTER — OFFICE VISIT (OUTPATIENT)
Dept: ORTHOPEDIC SURGERY | Facility: CLINIC | Age: 84
End: 2024-05-07
Payer: MEDICARE

## 2024-05-07 VITALS — BODY MASS INDEX: 26.93 KG/M2 | WEIGHT: 152 LBS | TEMPERATURE: 97.7 F | HEIGHT: 63 IN

## 2024-05-07 DIAGNOSIS — M19.041 ARTHRITIS OF RIGHT HAND: ICD-10-CM

## 2024-05-07 DIAGNOSIS — S62.346A NONDISPLACED FRACTURE OF BASE OF FIFTH METACARPAL BONE, RIGHT HAND, INITIAL ENCOUNTER FOR CLOSED FRACTURE: Primary | ICD-10-CM

## 2024-05-07 PROCEDURE — 1159F MED LIST DOCD IN RCRD: CPT | Performed by: NURSE PRACTITIONER

## 2024-05-07 PROCEDURE — 1160F RVW MEDS BY RX/DR IN RCRD: CPT | Performed by: NURSE PRACTITIONER

## 2024-05-07 PROCEDURE — 99203 OFFICE O/P NEW LOW 30 MIN: CPT | Performed by: NURSE PRACTITIONER

## 2024-05-10 ENCOUNTER — PATIENT ROUNDING (BHMG ONLY) (OUTPATIENT)
Dept: ORTHOPEDIC SURGERY | Facility: CLINIC | Age: 84
End: 2024-05-10
Payer: MEDICARE

## 2024-06-04 ENCOUNTER — OFFICE VISIT (OUTPATIENT)
Dept: CARDIOLOGY | Facility: CLINIC | Age: 84
End: 2024-06-04
Payer: MEDICARE

## 2024-06-04 ENCOUNTER — HOSPITAL ENCOUNTER (OUTPATIENT)
Dept: CARDIOLOGY | Facility: HOSPITAL | Age: 84
Discharge: HOME | End: 2024-06-04
Payer: MEDICARE

## 2024-06-04 VITALS
WEIGHT: 151 LBS | OXYGEN SATURATION: 98 % | SYSTOLIC BLOOD PRESSURE: 100 MMHG | HEIGHT: 63 IN | DIASTOLIC BLOOD PRESSURE: 60 MMHG | BODY MASS INDEX: 26.75 KG/M2

## 2024-06-04 DIAGNOSIS — I50.32 CHRONIC HEART FAILURE WITH PRESERVED EJECTION FRACTION (HFPEF): ICD-10-CM

## 2024-06-04 DIAGNOSIS — I50.32 CHRONIC HEART FAILURE WITH PRESERVED EJECTION FRACTION (HFPEF): Primary | ICD-10-CM

## 2024-06-04 DIAGNOSIS — I10 ESSENTIAL HYPERTENSION: ICD-10-CM

## 2024-06-04 LAB
ANION GAP SERPL CALCULATED.3IONS-SCNC: 8 MMOL/L (ref 5–15)
BUN SERPL-MCNC: 23 MG/DL (ref 8–23)
BUN/CREAT SERPL: 26.1 (ref 7–25)
CALCIUM SPEC-SCNC: 9.3 MG/DL (ref 8.6–10.5)
CHLORIDE SERPL-SCNC: 107 MMOL/L (ref 98–107)
CO2 SERPL-SCNC: 28 MMOL/L (ref 22–29)
CREAT SERPL-MCNC: 0.88 MG/DL (ref 0.57–1)
EGFRCR SERPLBLD CKD-EPI 2021: 65.3 ML/MIN/1.73
GLUCOSE SERPL-MCNC: 107 MG/DL (ref 65–99)
POTASSIUM SERPL-SCNC: 5 MMOL/L (ref 3.5–5.2)
SODIUM SERPL-SCNC: 143 MMOL/L (ref 136–145)

## 2024-06-04 PROCEDURE — 80048 BASIC METABOLIC PNL TOTAL CA: CPT | Performed by: INTERNAL MEDICINE

## 2024-06-04 PROCEDURE — 93000 ELECTROCARDIOGRAM COMPLETE: CPT | Performed by: INTERNAL MEDICINE

## 2024-06-04 PROCEDURE — 99214 OFFICE O/P EST MOD 30 MIN: CPT | Performed by: INTERNAL MEDICINE

## 2024-06-04 PROCEDURE — 3078F DIAST BP <80 MM HG: CPT | Performed by: INTERNAL MEDICINE

## 2024-06-04 PROCEDURE — 3074F SYST BP LT 130 MM HG: CPT | Performed by: INTERNAL MEDICINE

## 2024-06-04 PROCEDURE — 36415 COLL VENOUS BLD VENIPUNCTURE: CPT

## 2024-06-04 RX ORDER — FUROSEMIDE 20 MG/1
20 TABLET ORAL 3 TIMES WEEKLY
Start: 2024-06-05

## 2024-06-04 RX ORDER — AMLODIPINE BESYLATE 2.5 MG/1
2.5 TABLET ORAL EVERY EVENING
Start: 2024-06-04

## 2024-06-04 RX ORDER — SPIRONOLACTONE 25 MG/1
25 TABLET ORAL DAILY
Qty: 90 TABLET | Refills: 2 | Status: SHIPPED | OUTPATIENT
Start: 2024-06-04

## 2024-06-04 NOTE — PROGRESS NOTES
PATIENTINFORMATION    Date of Office Visit: 2024  Encounter Provider: Ranulfo Kirby MD  Place of Service: [facility] CARDIOLOGY  Patient Name: Estela Joshua  : 1940    Subjective:     Encounter Date:2024      Patient ID: Estela Joshua is a 83 y.o. female.    Chief Complaint   Patient presents with    primary hypertension     HPI  Ms. Joshua is a pleasant 83 years old lady who came to cardiology clinic for follow-up visit.  She recently traveled to [place] to visit her sick brother and returned about a week ago.  She has been feeling more tired than usual in the past 4 days but blood pressure has been running normal during home monitoring.  She denies symptoms of volume overload.  Compliant with current medications without significant side effects.  She reports making ample amount of urine on Lasix.  No recent ER visits or hospitalization.  She is fairly active but does not exercise regularly    ROS  All systems reviewed and negative except as noted in HPI.    Past Medical History:   Diagnosis Date    COPD (chronic obstructive pulmonary disease)     Depression     GERD (gastroesophageal reflux disease)     Hyperglycemia     Hyperlipidemia     Hypertension     Macular degeneration     Osteoarthritis        Past Surgical History:   Procedure Laterality Date    APPENDECTOMY      BREAST EXCISIONAL BIOPSY Right     ; benign    CARDIAC CATHETERIZATION      CARDIAC CATHETERIZATION N/A 2017    Procedure: Right and Left Heart Cath;  Surgeon: Pramod Salvador MD;  Location: [facility] INVASIVE LOCATION;  Service:     CARDIAC CATHETERIZATION N/A 2017    Procedure: Coronary angiography;  Surgeon: Pramod Salvador MD;  Location: [facility] CATH INVASIVE LOCATION;  Service:     CARDIAC CATHETERIZATION N/A 2017    Procedure: Left ventriculography;  Surgeon: Pramod Salvador MD;  Location: [facility] CATH INVASIVE LOCATION;  Service:     CATARACT EXTRACTION    "   COLONOSCOPY N/A 2017    Procedure: COLONOSCOPY TO CECUM;  Surgeon: Siddhartha Mujica MD;  Location: [facility] ENDOSCOPY;  Service:     ENDOSCOPY N/A 2017    Procedure: ESOPHAGOGASTRODUODENOSCOPY WITH COLD BIOPSIES;  Surgeon: Siddhartha Mujica MD;  Location: [facility] ENDOSCOPY;  Service:     LAPAROSCOPIC TUBAL LIGATION         Social History     Socioeconomic History    Marital status:    Tobacco Use    Smoking status: Former     Current packs/day: 0.00     Average packs/day: 1 pack/day for 20.0 years (20.0 ttl pk-yrs)     Types: Cigarettes     Start date:      Quit date:      Years since quittin.4    Smokeless tobacco: Never   Vaping Use    Vaping status: Never Used   Substance and Sexual Activity    Alcohol use: Yes     Alcohol/week: 2.0 standard drinks of alcohol     Types: 1 Glasses of wine, 1 Cans of beer per week    Drug use: No    Sexual activity: Defer       Family History   Problem Relation Age of Onset    Alzheimer's disease Mother     Stroke Mother     Heart failure Father            ECG 12 Lead    Date/Time: 2024 11:28 AM  Performed by: Ranulfo Kirby MD    Authorized by: Ranulfo Kirby MD  Comparison: compared with previous ECG from 2023  Similar to previous ECG  Rhythm: sinus rhythm  Rate: normal  Conduction: conduction normal  ST Segments: ST segments normal  T Waves: T waves normal  QRS axis: normal  Other: no other findings    Clinical impression: normal ECG             Objective:     /60   Ht 160 cm (63\")   Wt 68.5 kg (151 lb)   SpO2 98%   BMI 26.75 kg/m²  Body mass index is 26.75 kg/m².     Constitutional:       General: Not in acute distress.     Appearance: Well-developed. Not diaphoretic.   Eyes:      Pupils: Pupils are equal, round, and reactive to light.   HENT:      Head: Normocephalic and atraumatic.   Neck:      Thyroid: No thyromegaly.   Pulmonary:      Effort: Pulmonary effort is normal. No respiratory distress.      Breath " sounds: Normal breath sounds. No wheezing. No rales.   Chest:      Chest wall: Not tender to palpatation.   Cardiovascular:      Normal rate. Regular rhythm.      No gallop.    Pulses:     Intact distal pulses.   Edema:     Peripheral edema absent.   Abdominal:      General: Bowel sounds are normal. There is no distension.      Palpations: Abdomen is soft.      Tenderness: There is no guarding.   Musculoskeletal: Normal range of motion.         General: No deformity.      Cervical back: Normal range of motion and neck supple. Skin:     General: Skin is warm and dry.      Findings: No rash.   Neurological:      Mental Status: Alert and oriented to person, place, and time.      Cranial Nerves: No cranial nerve deficit.      Deep Tendon Reflexes: Reflexes are normal and symmetric.   Psychiatric:         Judgment: Judgment normal.         Review Of Data: I have reviewed pertinent recent labs, images and documents and pertinent findings included in HPI or assessment below.    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  Coronary artery calcification of LAD noted on CT chest.  Reportedly normal coronary angiogram with absent right coronary artery stenting 2017.  Normal myocardial perfusion study in March 2023.  Aspirin discontinued because of daily diclofenac use.  Chronic osteoarthritic pain for which she has been taking diclofenac 75 mg p.o. twice daily.    She reports increased fatigue in the last few days but normal vital signs during office visit.   Blood pressure 110s/60 on both arms.  Strong pulse.  Euvolemic on exam.  Excellent urine output on Lasix: I will switch her to 3 times weekly regimen.  Otherwise continue current medical care.  Check BMP  Follow-up in 6 months or sooner with concerns.    Diagnosis and plan of care discussed with patient and verbalized understanding.            Your medication list            Accurate as of June 4, 2024 11:30 AM. If you have any questions, ask your nurse or doctor.                CHANGE how you take these medications        Instructions Last Dose Given Next Dose Due   amLODIPine 2.5 MG tablet  Commonly known as: NORVASC  What changed: when to take this  Changed by: Ranulfo Kirby MD      Take 1 tablet by mouth Every Evening.              CONTINUE taking these medications        Instructions Last Dose Given Next Dose Due   albuterol sulfate  (90 Base) MCG/ACT inhaler  Commonly known as: PROVENTIL HFA;VENTOLIN HFA;PROAIR HFA      Inhale 2 puffs Every 6 (Six) Hours As Needed for Wheezing.       alendronate 70 MG tablet  Commonly known as: FOSAMAX      TAKE 1 TABLET BY MOUTH ONCE WEEKLY ON AN EMPTY STOMACH BEFORE BREAKFAST. REMAIN UPRIGHT FOR 30 MINUTES AND TAKE WITH 8 OUNCES OF WATER       atorvastatin 40 MG tablet  Commonly known as: LIPITOR      TAKE ONE TABLET BY MOUTH ONCE NIGHTLY       Besivance 0.6 % suspension ophthalmic suspension  Generic drug: besifloxacin      SHAKE LQ AND INT 1 GTT IN OD BID       budesonide-formoterol 80-4.5 MCG/ACT inhaler  Commonly known as: Symbicort      Inhale 2 puffs 2 (Two) Times a Day.       diclofenac 75 MG EC tablet  Commonly known as: VOLTAREN      TAKE ONE TABLET BY MOUTH TWICE A DAY WITH MEALS       EYE VITAMINS PO      Take  by mouth.       fluticasone 50 MCG/ACT nasal spray  Commonly known as: FLONASE      USE ONE SPRAY IN EACH NOSTRIL MORNING AND EVENING       furosemide 20 MG tablet  Commonly known as: LASIX      Take 1 tablet by mouth Daily.       GLUCOSAMINE  PO      Take 50 mg by mouth Daily.       pantoprazole 40 MG EC tablet  Commonly known as: PROTONIX      Take 1 tablet by mouth Daily.       sertraline 100 MG tablet  Commonly known as: ZOLOFT      TAKE ONE TABLET BY MOUTH DAILY       spironolactone 25 MG tablet  Commonly known as: ALDACTONE      Take 1 tablet by mouth Daily.              STOP taking these medications      aspirin 81 MG EC tablet  Stopped by: Ranulfo Kirby MD                  Where to Get Your Medications        These medications were sent to [facility] [id number] - [place], [place] - [address] AT [address] - [phone] [facility] [phone]   [address], [facility] [address]      Phone: [phone]   spironolactone 25 MG tablet       Information about where to get these medications is not yet available    Ask your nurse or doctor about these medications  amLODIPine 2.5 MG tablet             Ranulfo Kirby MD  06/04/24  11:30 EDT

## 2024-06-09 NOTE — PROGRESS NOTES
Please notify Ms. Joshua that her kidney function test and electrolytes levels are all normal.  Let me know if she has any questions.  Thanks

## 2024-06-28 RX ORDER — AMLODIPINE BESYLATE 2.5 MG/1
2.5 TABLET ORAL DAILY
Qty: 90 TABLET | Refills: 0 | Status: SHIPPED | OUTPATIENT
Start: 2024-06-28

## 2024-07-03 DIAGNOSIS — E78.2 MIXED HYPERLIPIDEMIA: Primary | ICD-10-CM

## 2024-07-03 DIAGNOSIS — I10 ESSENTIAL HYPERTENSION: ICD-10-CM

## 2024-07-03 DIAGNOSIS — M85.80 OSTEOPENIA, UNSPECIFIED LOCATION: ICD-10-CM

## 2024-07-03 RX ORDER — PANTOPRAZOLE SODIUM 40 MG/1
40 TABLET, DELAYED RELEASE ORAL DAILY
Qty: 90 TABLET | Refills: 3 | Status: SHIPPED | OUTPATIENT
Start: 2024-07-03

## 2024-07-03 RX ORDER — DICLOFENAC SODIUM 75 MG/1
75 TABLET, DELAYED RELEASE ORAL 2 TIMES DAILY WITH MEALS
Qty: 180 TABLET | Refills: 1 | Status: SHIPPED | OUTPATIENT
Start: 2024-07-03

## 2024-07-03 NOTE — TELEPHONE ENCOUNTER
Caller: Estela Joshua    Relationship: Self    Best call back number:[phone]    Requested Prescriptions:   Requested Prescriptions     Pending Prescriptions Disp Refills    diclofenac (VOLTAREN) 75 MG EC tablet 180 tablet 1     Sig: Take 1 tablet by mouth 2 (Two) Times a Day With Meals.    pantoprazole (PROTONIX) 40 MG EC tablet 90 tablet 3     Sig: Take 1 tablet by mouth Daily.        Pharmacy where request should be sent: [facility] [id number] [address] AT [facility] & [facility] [phone] [facility] [phone]      Last office visit with prescribing clinician: 1/4/2024   Last telemedicine visit with prescribing clinician: Visit date not found   Next office visit with prescribing clinician: 7/18/2024     Additional details provided by patient: ABOUT A WEEK LEFT    Does the patient have less than a 3 day supply:  [x] Yes  [] No    Would you like a call back once the refill request has been completed: [] Yes [x] No    If the office needs to give you a call back, can they leave a voicemail: [] Yes [x] No    Mark Bell Rep   07/03/24 11:01 EDT

## 2024-07-06 LAB
ALBUMIN SERPL-MCNC: 4.4 G/DL (ref 3.5–5.2)
ALBUMIN/GLOB SERPL: 2.2 G/DL
ALP SERPL-CCNC: 30 U/L (ref 39–117)
ALT SERPL-CCNC: 20 U/L (ref 1–33)
AST SERPL-CCNC: 22 U/L (ref 1–32)
BILIRUB SERPL-MCNC: 0.3 MG/DL (ref 0–1.2)
BUN SERPL-MCNC: 29 MG/DL (ref 8–23)
BUN/CREAT SERPL: 26.9 (ref 7–25)
CALCIUM SERPL-MCNC: 9.8 MG/DL (ref 8.6–10.5)
CHLORIDE SERPL-SCNC: 103 MMOL/L (ref 98–107)
CHOLEST SERPL-MCNC: 172 MG/DL (ref 0–200)
CO2 SERPL-SCNC: 24.5 MMOL/L (ref 22–29)
CREAT SERPL-MCNC: 1.08 MG/DL (ref 0.57–1)
EGFRCR SERPLBLD CKD-EPI 2021: 51.1 ML/MIN/1.73
GLOBULIN SER CALC-MCNC: 2 GM/DL
GLUCOSE SERPL-MCNC: 80 MG/DL (ref 65–99)
HDLC SERPL-MCNC: 44 MG/DL (ref 40–60)
LDLC SERPL CALC-MCNC: 104 MG/DL (ref 0–100)
LDLC/HDLC SERPL: 2.3 {RATIO}
POTASSIUM SERPL-SCNC: 5 MMOL/L (ref 3.5–5.2)
PROT SERPL-MCNC: 6.4 G/DL (ref 6–8.5)
SODIUM SERPL-SCNC: 140 MMOL/L (ref 136–145)
TRIGL SERPL-MCNC: 134 MG/DL (ref 0–150)
VLDLC SERPL CALC-MCNC: 24 MG/DL (ref 5–40)

## 2024-07-18 ENCOUNTER — OFFICE VISIT (OUTPATIENT)
Dept: FAMILY MEDICINE CLINIC | Facility: CLINIC | Age: 84
End: 2024-07-18
Payer: MEDICARE

## 2024-07-18 VITALS
HEIGHT: 63 IN | SYSTOLIC BLOOD PRESSURE: 136 MMHG | HEART RATE: 55 BPM | WEIGHT: 150 LBS | BODY MASS INDEX: 26.58 KG/M2 | DIASTOLIC BLOOD PRESSURE: 80 MMHG | RESPIRATION RATE: 16 BRPM | OXYGEN SATURATION: 97 %

## 2024-07-18 DIAGNOSIS — Z23 NEED FOR PNEUMOCOCCAL VACCINATION: ICD-10-CM

## 2024-07-18 DIAGNOSIS — M85.80 OSTEOPENIA, UNSPECIFIED LOCATION: ICD-10-CM

## 2024-07-18 DIAGNOSIS — I50.32 CHRONIC HEART FAILURE WITH PRESERVED EJECTION FRACTION (HFPEF): ICD-10-CM

## 2024-07-18 DIAGNOSIS — E78.2 MIXED HYPERLIPIDEMIA: ICD-10-CM

## 2024-07-18 DIAGNOSIS — I10 ESSENTIAL HYPERTENSION: Primary | ICD-10-CM

## 2024-07-18 DIAGNOSIS — F32.89 OTHER DEPRESSION: ICD-10-CM

## 2024-07-18 DIAGNOSIS — K21.9 GASTROESOPHAGEAL REFLUX DISEASE, UNSPECIFIED WHETHER ESOPHAGITIS PRESENT: ICD-10-CM

## 2024-07-18 PROCEDURE — G0009 ADMIN PNEUMOCOCCAL VACCINE: HCPCS | Performed by: INTERNAL MEDICINE

## 2024-07-18 PROCEDURE — 3075F SYST BP GE 130 - 139MM HG: CPT | Performed by: INTERNAL MEDICINE

## 2024-07-18 PROCEDURE — 90677 PCV20 VACCINE IM: CPT | Performed by: INTERNAL MEDICINE

## 2024-07-18 PROCEDURE — 1125F AMNT PAIN NOTED PAIN PRSNT: CPT | Performed by: INTERNAL MEDICINE

## 2024-07-18 PROCEDURE — 3079F DIAST BP 80-89 MM HG: CPT | Performed by: INTERNAL MEDICINE

## 2024-07-18 PROCEDURE — 99214 OFFICE O/P EST MOD 30 MIN: CPT | Performed by: INTERNAL MEDICINE

## 2024-07-18 NOTE — PROGRESS NOTES
Juan Manuel Joshua is a 83 y.o. female. Patient is here today for   Chief Complaint   Patient presents with    Hypertension    Follow-up          Vitals:    24 1045   BP: 136/80   Pulse: 55   Resp: 16   SpO2: 97%     Body mass index is 26.58 kg/m².      The following portions of the patient's history were reviewed and updated as appropriate: allergies, current medications, past family history, past medical history, past social history, past surgical history and problem list.    Past Medical History:   Diagnosis Date    COPD (chronic obstructive pulmonary disease)     Depression     GERD (gastroesophageal reflux disease)     Hyperglycemia     Hyperlipidemia     Hypertension     Macular degeneration     Osteoarthritis       No Known Allergies   Social History     Socioeconomic History    Marital status:    Tobacco Use    Smoking status: Former     Current packs/day: 0.00     Average packs/day: 1 pack/day for 20.0 years (20.0 ttl pk-yrs)     Types: Cigarettes     Start date:      Quit date:      Years since quittin.5    Smokeless tobacco: Never   Vaping Use    Vaping status: Never Used   Substance and Sexual Activity    Alcohol use: Yes     Alcohol/week: 2.0 standard drinks of alcohol     Types: 1 Glasses of wine, 1 Cans of beer per week    Drug use: No    Sexual activity: Defer        Current Outpatient Medications:     albuterol sulfate  (90 Base) MCG/ACT inhaler, Inhale 2 puffs Every 6 (Six) Hours As Needed for Wheezing., Disp: 18 g, Rfl: 5    alendronate (FOSAMAX) 70 MG tablet, TAKE 1 TABLET BY MOUTH ONCE WEEKLY ON AN EMPTY STOMACH BEFORE BREAKFAST. REMAIN UPRIGHT FOR 30 MINUTES AND TAKE WITH 8 OUNCES OF WATER, Disp: 12 tablet, Rfl: 3    amLODIPine (NORVASC) 2.5 MG tablet, TAKE 1 TABLET BY MOUTH DAILY, Disp: 90 tablet, Rfl: 0    atorvastatin (LIPITOR) 40 MG tablet, TAKE ONE TABLET BY MOUTH ONCE NIGHTLY, Disp: 90 tablet, Rfl: 3    BESIVANCE 0.6 % suspension ophthalmic  suspension, SHAKE LQ AND INT 1 GTT IN OD BID, Disp: , Rfl: 1    budesonide-formoterol (Symbicort) 80-4.5 MCG/ACT inhaler, Inhale 2 puffs 2 (Two) Times a Day., Disp: 3 each, Rfl: 3    diclofenac (VOLTAREN) 75 MG EC tablet, Take 1 tablet by mouth 2 (Two) Times a Day With Meals., Disp: 180 tablet, Rfl: 1    fluticasone (FLONASE) 50 MCG/ACT nasal spray, USE ONE SPRAY IN EACH NOSTRIL MORNING AND EVENING, Disp: 16 mL, Rfl: 1    furosemide (LASIX) 20 MG tablet, Take 1 tablet by mouth 3 (Three) Times a Week., Disp: , Rfl:     Glucosamine HCl (GLUCOSAMINE PO), Take 50 mg by mouth Daily., Disp: , Rfl:     Multiple Vitamins-Minerals (EYE VITAMINS PO), Take  by mouth., Disp: , Rfl:     pantoprazole (PROTONIX) 40 MG EC tablet, Take 1 tablet by mouth Daily., Disp: 90 tablet, Rfl: 3    sertraline (ZOLOFT) 100 MG tablet, TAKE ONE TABLET BY MOUTH DAILY, Disp: 90 tablet, Rfl: 3    spironolactone (ALDACTONE) 25 MG tablet, Take 1 tablet by mouth Daily., Disp: 90 tablet, Rfl: 2     Objective   History of Present Illness Estela is here for blood pressure check and lab follow-up.  She has hypertension, hyperlipidemia, COPD, depression in remission, stable preserved ejection fraction heart failure, carotid artery disease, knee osteoarthritis, osteopenia.  She feels well.  She eats healthy and walks some for exercise.  She has not been monitoring her blood pressure.  She had vascular screening test 2 years ago and had unilateral mild carotid artery disease less than 50% stenosis.  She does complain of bilateral knee pain as well as instability.  She did have a steroid injection helped.    Review of Systems   Constitutional:  Negative for activity change and unexpected weight change.   Respiratory:  Negative for shortness of breath.    Cardiovascular:  Negative for leg swelling.   Neurological:  Negative for light-headedness.   Psychiatric/Behavioral:  Negative for dysphoric mood.        Physical Exam  Vitals reviewed.   Constitutional:        Appearance: Normal appearance.   Neck:      Vascular: No carotid bruit.   Cardiovascular:      Rate and Rhythm: Normal rate and regular rhythm.      Heart sounds: Normal heart sounds.      Comments: 112/60  Pulmonary:      Effort: Pulmonary effort is normal.      Breath sounds: Normal breath sounds.   Neurological:      Mental Status: She is alert.   Psychiatric:         Mood and Affect: Mood normal.         Behavior: Behavior normal.         Thought Content: Thought content normal.         Judgment: Judgment normal.         Assessment blood pressure is well-controlled.  LDL cholesterol is 104 up from 78 6 months ago.  Goal is 55 or less.  BUN and creatinine are elevated secondary to dehydration the morning of your lab draw.  Discussed healthy heart diet and importance of regular exercise program.  Discussed knee osteoarthritis and quadricep strengthening exercises.  Will continue current medicines..  ASSESSMENT    Problems Addressed this Visit          Cardiac and Vasculature    Hyperlipidemia    Essential hypertension - Primary    Chronic heart failure with preserved ejection fraction (HFpEF)       Gastrointestinal Abdominal     GERD (gastroesophageal reflux disease)       Mental Health    Depression       Musculoskeletal and Injuries    Osteopenia     Other Visit Diagnoses       Need for pneumococcal vaccination        Relevant Orders    Pneumococcal Conjugate Vaccine 20-Valent All (Completed)          Diagnoses         Codes Comments    Essential hypertension    -  Primary ICD-10-CM: I10  ICD-9-CM: 401.9     Mixed hyperlipidemia     ICD-10-CM: E78.2  ICD-9-CM: 272.2     Chronic heart failure with preserved ejection fraction (HFpEF)     ICD-10-CM: I50.32  ICD-9-CM: 428.9     Other depression     ICD-10-CM: F32.89  ICD-9-CM: 311     Gastroesophageal reflux disease, unspecified whether esophagitis present     ICD-10-CM: K21.9  ICD-9-CM: 530.81     Osteopenia, unspecified location     ICD-10-CM: M85.80  ICD-9-CM:  733.90     Need for pneumococcal vaccination     ICD-10-CM: Z23  ICD-9-CM: V03.82             PLAN  There are no Patient Instructions on file for this visit.    Return in about 6 months (around 1/18/2025), or Wellness visit, for cmp, lipid, cbc, tsh.

## 2024-08-01 ENCOUNTER — TELEPHONE (OUTPATIENT)
Dept: FAMILY MEDICINE CLINIC | Facility: CLINIC | Age: 84
End: 2024-08-01

## 2024-08-01 NOTE — TELEPHONE ENCOUNTER
Caller: Estela Joshua    Relationship: Self    Best call back number: [phone]     What is the best time to reach you: ANY    Who are you requesting to speak with (clinical staff, provider,  specific staff member): CLINICAL STAFF    Do you know the name of the person who called:      What was the call regarding: PATIENT CALLED STATED SHE WAS SEEN COUPLE WEEKS AGO WENT OVER LABS, DISCUSSED THE DIARRHEA THEN.  SHE IS STILL HAVING IT 2 TO 3 TIMES PER DAY AND GETTING WEAK FROM IT.  SHE WANTS TO KNOW IF THERE IS SOMETHING SHE CAN DO OR TAKE.  PLEASE CALL AND LET HER KNOW.      Is it okay if the provider responds through MyChart:

## 2024-08-01 NOTE — TELEPHONE ENCOUNTER
"Relay     \"Called to advise patient to schedule an appointment with any available PCP  \"                 "

## 2024-09-09 ENCOUNTER — TELEPHONE (OUTPATIENT)
Dept: CARDIOLOGY | Facility: CLINIC | Age: 84
End: 2024-09-09
Payer: MEDICARE

## 2024-09-23 RX ORDER — FUROSEMIDE 20 MG
20 TABLET ORAL DAILY
Qty: 90 TABLET | Refills: 0 | Status: SHIPPED | OUTPATIENT
Start: 2024-09-23

## 2024-09-30 RX ORDER — AMLODIPINE BESYLATE 2.5 MG/1
2.5 TABLET ORAL DAILY
Qty: 90 TABLET | Refills: 0 | Status: SHIPPED | OUTPATIENT
Start: 2024-09-30

## 2024-10-31 RX ORDER — AMLODIPINE BESYLATE 2.5 MG/1
2.5 TABLET ORAL DAILY
Qty: 90 TABLET | Refills: 0 | Status: SHIPPED | OUTPATIENT
Start: 2024-10-31

## 2024-10-31 RX ORDER — FUROSEMIDE 20 MG/1
20 TABLET ORAL DAILY
Qty: 90 TABLET | Refills: 0 | Status: SHIPPED | OUTPATIENT
Start: 2024-10-31

## 2024-12-03 ENCOUNTER — OFFICE VISIT (OUTPATIENT)
Dept: FAMILY MEDICINE CLINIC | Facility: CLINIC | Age: 84
End: 2024-12-03
Payer: MEDICARE

## 2024-12-03 VITALS
HEART RATE: 62 BPM | SYSTOLIC BLOOD PRESSURE: 136 MMHG | DIASTOLIC BLOOD PRESSURE: 58 MMHG | TEMPERATURE: 97.9 F | HEIGHT: 63 IN | WEIGHT: 146 LBS | BODY MASS INDEX: 25.87 KG/M2 | RESPIRATION RATE: 17 BRPM | OXYGEN SATURATION: 99 %

## 2024-12-03 DIAGNOSIS — R19.7 DIARRHEA, UNSPECIFIED TYPE: Primary | ICD-10-CM

## 2024-12-03 PROCEDURE — 3075F SYST BP GE 130 - 139MM HG: CPT | Performed by: INTERNAL MEDICINE

## 2024-12-03 PROCEDURE — 1125F AMNT PAIN NOTED PAIN PRSNT: CPT | Performed by: INTERNAL MEDICINE

## 2024-12-03 PROCEDURE — 99213 OFFICE O/P EST LOW 20 MIN: CPT | Performed by: INTERNAL MEDICINE

## 2024-12-03 PROCEDURE — 3078F DIAST BP <80 MM HG: CPT | Performed by: INTERNAL MEDICINE

## 2024-12-03 NOTE — PROGRESS NOTES
Juan Manuel Joshua is a 84 y.o. female. Patient is here today for   Chief Complaint   Patient presents with    Constipation    Diarrhea          Vitals:    24 1554   BP: 136/58   Pulse: 62   Resp: 17   Temp: 97.9 °F (36.6 °C)   SpO2: 99%     Body mass index is 25.87 kg/m².    The following portions of the patient's history were reviewed and updated as appropriate: allergies, current medications, past family history, past medical history, past social history, past surgical history and problem list.    Past Medical History:   Diagnosis Date    COPD (chronic obstructive pulmonary disease)     Depression     GERD (gastroesophageal reflux disease)     Hyperglycemia     Hyperlipidemia     Hypertension     Macular degeneration     Osteoarthritis       No Known Allergies   Social History     Socioeconomic History    Marital status:    Tobacco Use    Smoking status: Former     Current packs/day: 0.00     Average packs/day: 1 pack/day for 20.0 years (20.0 ttl pk-yrs)     Types: Cigarettes     Start date:      Quit date:      Years since quittin.9    Smokeless tobacco: Never   Vaping Use    Vaping status: Never Used   Substance and Sexual Activity    Alcohol use: Yes     Alcohol/week: 2.0 standard drinks of alcohol     Types: 1 Glasses of wine, 1 Cans of beer per week    Drug use: No    Sexual activity: Defer        Current Outpatient Medications:     albuterol sulfate  (90 Base) MCG/ACT inhaler, Inhale 2 puffs Every 6 (Six) Hours As Needed for Wheezing., Disp: 18 g, Rfl: 5    alendronate (FOSAMAX) 70 MG tablet, TAKE 1 TABLET BY MOUTH ONCE WEEKLY ON AN EMPTY STOMACH BEFORE BREAKFAST. REMAIN UPRIGHT FOR 30 MINUTES AND TAKE WITH 8 OUNCES OF WATER, Disp: 12 tablet, Rfl: 3    amLODIPine (NORVASC) 2.5 MG tablet, TAKE 1 TABLET BY MOUTH DAILY, Disp: 90 tablet, Rfl: 0    atorvastatin (LIPITOR) 40 MG tablet, TAKE ONE TABLET BY MOUTH ONCE NIGHTLY, Disp: 90 tablet, Rfl: 3    BESIVANCE 0.6 %  suspension ophthalmic suspension, SHAKE LQ AND INT 1 GTT IN OD BID, Disp: , Rfl: 1    budesonide-formoterol (Symbicort) 80-4.5 MCG/ACT inhaler, Inhale 2 puffs 2 (Two) Times a Day., Disp: 3 each, Rfl: 3    diclofenac (VOLTAREN) 75 MG EC tablet, Take 1 tablet by mouth 2 (Two) Times a Day With Meals., Disp: 180 tablet, Rfl: 1    dicyclomine (BENTYL) 10 MG capsule, Take 1 capsule by mouth 3 (Three) Times a Day As Needed for Abdominal Cramping., Disp: 20 capsule, Rfl: 0    fluticasone (FLONASE) 50 MCG/ACT nasal spray, USE ONE SPRAY IN EACH NOSTRIL MORNING AND EVENING, Disp: 16 mL, Rfl: 1    furosemide (LASIX) 20 MG tablet, TAKE 1 TABLET BY MOUTH DAILY, Disp: 90 tablet, Rfl: 0    Glucosamine HCl (GLUCOSAMINE PO), Take 50 mg by mouth Daily., Disp: , Rfl:     Multiple Vitamins-Minerals (EYE VITAMINS PO), Take  by mouth., Disp: , Rfl:     pantoprazole (PROTONIX) 40 MG EC tablet, Take 1 tablet by mouth Daily., Disp: 90 tablet, Rfl: 3    sertraline (ZOLOFT) 100 MG tablet, TAKE ONE TABLET BY MOUTH DAILY, Disp: 90 tablet, Rfl: 3    spironolactone (ALDACTONE) 25 MG tablet, Take 1 tablet by mouth Daily., Disp: 90 tablet, Rfl: 2     Objective     History of Present Illness   History of Present Illness  The patient is an 85-year-old female who presents today with complaints of diarrhea and constipation.    She has been experiencing intermittent diarrhea for the past 9 months, with one particularly severe episode occurring in [place]. The diarrhea is described as watery, not resembling a typical bowel movement, and is alvarado in color. She reports no presence of blood in her stool. The diarrhea typically lasts for a day, followed by a day of normal bowel movements, and then recurs. The current episode of diarrhea has been ongoing for over a month. She experienced diarrhea today, which she attributes to her regular diet. She believes that bran cereal may be causing her symptoms, as it seems to form a knot in her stomach. She consumes  this cereal twice a week. She also eats steel-cut oats, which do not cause her to feel bloated.     She has experienced abdominal cramps twice, which are usually accompanied by significant stomach noise. She reports no nausea or vomiting. Her weight has remained stable, although she reports a loss of 4 pounds. She reports no fever or chills. She has been dealing with weight issues for several years and often feels bloated. She does not avoid gluten. She has not had a solid bowel movement for the past 2 weeks. She defines constipation as the absence of a bowel movement. She reports no pain, except for the occasional cramps. She has been consuming more fruit, particularly pineapple.    Review of Systems   Constitutional:  Negative for activity change, appetite change and unexpected weight change.   Respiratory: Negative.     Cardiovascular: Negative.    Gastrointestinal:  Positive for abdominal pain, constipation and diarrhea. Negative for blood in stool, nausea and vomiting.   Neurological: Negative.    Psychiatric/Behavioral: Negative.         Physical Exam  Vitals reviewed.   Constitutional:       Appearance: Normal appearance.   Cardiovascular:      Rate and Rhythm: Normal rate and regular rhythm.      Heart sounds: Normal heart sounds.   Pulmonary:      Effort: Pulmonary effort is normal.      Breath sounds: Normal breath sounds.   Abdominal:      General: Abdomen is flat. There is no distension.      Palpations: There is no mass.      Tenderness: There is no abdominal tenderness.      Comments: Bowel sounds increased   Neurological:      Mental Status: She is alert.   Psychiatric:         Mood and Affect: Mood normal.         Behavior: Behavior normal.         Thought Content: Thought content normal.         Judgment: Judgment normal.     Physical Exam  Hyperactive bowel sounds noted in the gastrointestinal system.    Vital Signs  Weight is 146.    Results         Assessment    ASSESSMENT    Problems Addressed  this Visit          Gastrointestinal Abdominal     Diarrhea - Primary    Relevant Orders    Comprehensive Metabolic Panel    CBC & Differential    Gastrointestinal Panel, PCR - Stool, Per Rectum    Celiac Disease Panel     Diagnoses         Codes Comments    Diarrhea, unspecified type    -  Primary ICD-10-CM: R19.7  ICD-9-CM: 787.91           Assessment & Plan  1. Diarrhea.  She reports episodes of watery diarrhea lasting for over a month, with intermittent periods of formed stools. She denies any blood in her stool, nausea, or vomiting. Her weight has remained stable despite her perception of weight loss. Abdominal cramps are present but not constant. The role of fiber in stool formation was explained, and she was advised to increase her dietary fiber intake. Stool studies and blood work will be ordered to investigate the cause of her symptoms. Tests for bacterial organisms, parasites, and celiac disease will be conducted. She was provided with a fiber sheet and advised to keep a diary to track her food intake and symptoms.    2. Constipation.  She reports episodes of constipation, defined as not having a bowel movement, often following episodes of diarrhea. The process of peristalsis and stool formation was discussed. She was advised to increase her dietary fiber intake to help with stool formation.      PLAN  There are no Patient Instructions on file for this visit.  No follow-ups on file.    Patient or patient representative verbalized consent for the use of Ambient Listening during the visit with  Robert Miner MD for chart documentation. 12/3/2024  16:49 EST

## 2024-12-05 DIAGNOSIS — R19.7 DIARRHEA, UNSPECIFIED TYPE: Primary | ICD-10-CM

## 2024-12-05 LAB
ALBUMIN SERPL-MCNC: 3.9 G/DL (ref 3.7–4.7)
ALP SERPL-CCNC: 38 IU/L (ref 44–121)
ALT SERPL-CCNC: 12 IU/L (ref 0–32)
AST SERPL-CCNC: 16 IU/L (ref 0–40)
BASOPHILS # BLD AUTO: 0 X10E3/UL (ref 0–0.2)
BASOPHILS NFR BLD AUTO: 0 %
BILIRUB SERPL-MCNC: 0.4 MG/DL (ref 0–1.2)
BUN SERPL-MCNC: 18 MG/DL (ref 8–27)
BUN/CREAT SERPL: 19 (ref 12–28)
CALCIUM SERPL-MCNC: 9.2 MG/DL (ref 8.7–10.3)
CHLORIDE SERPL-SCNC: 106 MMOL/L (ref 96–106)
CO2 SERPL-SCNC: 24 MMOL/L (ref 20–29)
CREAT SERPL-MCNC: 0.95 MG/DL (ref 0.57–1)
EGFRCR SERPLBLD CKD-EPI 2021: 59 ML/MIN/1.73
ENDOMYSIUM IGA SER QL: NEGATIVE
EOSINOPHIL # BLD AUTO: 0 X10E3/UL (ref 0–0.4)
EOSINOPHIL NFR BLD AUTO: 0 %
ERYTHROCYTE [DISTWIDTH] IN BLOOD BY AUTOMATED COUNT: 12.2 % (ref 11.7–15.4)
GLOBULIN SER CALC-MCNC: 2.3 G/DL (ref 1.5–4.5)
GLUCOSE SERPL-MCNC: 85 MG/DL (ref 70–99)
HCT VFR BLD AUTO: 34.4 % (ref 34–46.6)
HGB BLD-MCNC: 11.3 G/DL (ref 11.1–15.9)
IGA SERPL-MCNC: 110 MG/DL (ref 64–422)
IMM GRANULOCYTES # BLD AUTO: 0 X10E3/UL (ref 0–0.1)
IMM GRANULOCYTES NFR BLD AUTO: 0 %
LYMPHOCYTES # BLD AUTO: 1.3 X10E3/UL (ref 0.7–3.1)
LYMPHOCYTES NFR BLD AUTO: 11 %
MCH RBC QN AUTO: 31.8 PG (ref 26.6–33)
MCHC RBC AUTO-ENTMCNC: 32.8 G/DL (ref 31.5–35.7)
MCV RBC AUTO: 97 FL (ref 79–97)
MONOCYTES # BLD AUTO: 0.7 X10E3/UL (ref 0.1–0.9)
MONOCYTES NFR BLD AUTO: 6 %
NEUTROPHILS # BLD AUTO: 10.3 X10E3/UL (ref 1.4–7)
NEUTROPHILS NFR BLD AUTO: 83 %
PLATELET # BLD AUTO: 279 X10E3/UL (ref 150–450)
POTASSIUM SERPL-SCNC: 4.2 MMOL/L (ref 3.5–5.2)
PROT SERPL-MCNC: 6.2 G/DL (ref 6–8.5)
RBC # BLD AUTO: 3.55 X10E6/UL (ref 3.77–5.28)
SODIUM SERPL-SCNC: 143 MMOL/L (ref 134–144)
TTG IGA SER-ACNC: <2 U/ML (ref 0–3)
WBC # BLD AUTO: 12.5 X10E3/UL (ref 3.4–10.8)

## 2024-12-06 DIAGNOSIS — R19.7 DIARRHEA, UNSPECIFIED TYPE: Primary | ICD-10-CM

## 2024-12-10 LAB
BACTERIA SPEC CULT: NORMAL
BACTERIA SPEC CULT: NORMAL
CAMPYLOBACTER STL CULT: NORMAL
E COLI SXT STL QL IA: NEGATIVE
SALM + SHIG STL CULT: NORMAL

## 2025-01-13 DIAGNOSIS — E78.2 MIXED HYPERLIPIDEMIA: ICD-10-CM

## 2025-01-13 DIAGNOSIS — M25.50 ARTHRALGIA, UNSPECIFIED JOINT: ICD-10-CM

## 2025-01-13 DIAGNOSIS — I10 PRIMARY HYPERTENSION: ICD-10-CM

## 2025-01-13 DIAGNOSIS — I10 ESSENTIAL HYPERTENSION: Primary | ICD-10-CM

## 2025-01-13 DIAGNOSIS — I50.32 CHRONIC HEART FAILURE WITH PRESERVED EJECTION FRACTION (HFPEF): ICD-10-CM

## 2025-01-14 LAB
ALBUMIN SERPL-MCNC: 4.1 G/DL (ref 3.5–5.2)
ALBUMIN/GLOB SERPL: 1.8 G/DL
ALP SERPL-CCNC: 40 U/L (ref 39–117)
ALT SERPL-CCNC: 16 U/L (ref 1–33)
AST SERPL-CCNC: 20 U/L (ref 1–32)
BASOPHILS # BLD AUTO: 0.03 10*3/MM3 (ref 0–0.2)
BASOPHILS NFR BLD AUTO: 0.5 % (ref 0–1.5)
BILIRUB SERPL-MCNC: 0.3 MG/DL (ref 0–1.2)
BUN SERPL-MCNC: 14 MG/DL (ref 8–23)
BUN/CREAT SERPL: 15.9 (ref 7–25)
CALCIUM SERPL-MCNC: 9.4 MG/DL (ref 8.6–10.5)
CHLORIDE SERPL-SCNC: 106 MMOL/L (ref 98–107)
CHOLEST SERPL-MCNC: 160 MG/DL (ref 0–200)
CO2 SERPL-SCNC: 25.8 MMOL/L (ref 22–29)
CREAT SERPL-MCNC: 0.88 MG/DL (ref 0.57–1)
EGFRCR SERPLBLD CKD-EPI 2021: 64.9 ML/MIN/1.73
ENDOMYSIUM IGA SER QL: NEGATIVE
EOSINOPHIL # BLD AUTO: 0.06 10*3/MM3 (ref 0–0.4)
EOSINOPHIL NFR BLD AUTO: 0.9 % (ref 0.3–6.2)
ERYTHROCYTE [DISTWIDTH] IN BLOOD BY AUTOMATED COUNT: 12.6 % (ref 12.3–15.4)
GLOBULIN SER CALC-MCNC: 2.3 GM/DL
GLUCOSE SERPL-MCNC: 86 MG/DL (ref 65–99)
HCT VFR BLD AUTO: 33.7 % (ref 34–46.6)
HDLC SERPL-MCNC: 37 MG/DL (ref 40–60)
HGB BLD-MCNC: 11.8 G/DL (ref 12–15.9)
IGA SERPL-MCNC: 124 MG/DL (ref 64–422)
IMM GRANULOCYTES # BLD AUTO: 0.02 10*3/MM3 (ref 0–0.05)
IMM GRANULOCYTES NFR BLD AUTO: 0.3 % (ref 0–0.5)
LDLC SERPL CALC-MCNC: 96 MG/DL (ref 0–100)
LDLC/HDLC SERPL: 2.48 {RATIO}
LYMPHOCYTES # BLD AUTO: 1.19 10*3/MM3 (ref 0.7–3.1)
LYMPHOCYTES NFR BLD AUTO: 18.7 % (ref 19.6–45.3)
MCH RBC QN AUTO: 32.7 PG (ref 26.6–33)
MCHC RBC AUTO-ENTMCNC: 35 G/DL (ref 31.5–35.7)
MCV RBC AUTO: 93.4 FL (ref 79–97)
MONOCYTES # BLD AUTO: 0.47 10*3/MM3 (ref 0.1–0.9)
MONOCYTES NFR BLD AUTO: 7.4 % (ref 5–12)
NEUTROPHILS # BLD AUTO: 4.6 10*3/MM3 (ref 1.7–7)
NEUTROPHILS NFR BLD AUTO: 72.2 % (ref 42.7–76)
NRBC BLD AUTO-RTO: 0 /100 WBC (ref 0–0.2)
PLATELET # BLD AUTO: 218 10*3/MM3 (ref 140–450)
POTASSIUM SERPL-SCNC: 4.2 MMOL/L (ref 3.5–5.2)
PROT SERPL-MCNC: 6.4 G/DL (ref 6–8.5)
RBC # BLD AUTO: 3.61 10*6/MM3 (ref 3.77–5.28)
SODIUM SERPL-SCNC: 141 MMOL/L (ref 136–145)
TRIGL SERPL-MCNC: 156 MG/DL (ref 0–150)
TSH SERPL DL<=0.005 MIU/L-ACNC: 2.51 UIU/ML (ref 0.27–4.2)
TTG IGA SER-ACNC: <2 U/ML (ref 0–3)
VLDLC SERPL CALC-MCNC: 27 MG/DL (ref 5–40)
WBC # BLD AUTO: 6.37 10*3/MM3 (ref 3.4–10.8)

## 2025-01-20 ENCOUNTER — OFFICE VISIT (OUTPATIENT)
Dept: FAMILY MEDICINE CLINIC | Facility: CLINIC | Age: 85
End: 2025-01-20
Payer: MEDICARE

## 2025-01-20 VITALS
HEART RATE: 67 BPM | TEMPERATURE: 97.8 F | RESPIRATION RATE: 16 BRPM | SYSTOLIC BLOOD PRESSURE: 92 MMHG | DIASTOLIC BLOOD PRESSURE: 48 MMHG | HEIGHT: 63 IN | BODY MASS INDEX: 25.87 KG/M2 | WEIGHT: 146 LBS | OXYGEN SATURATION: 97 %

## 2025-01-20 DIAGNOSIS — R19.7 DIARRHEA, UNSPECIFIED TYPE: ICD-10-CM

## 2025-01-20 DIAGNOSIS — R10.9 ABDOMINAL PAIN, UNSPECIFIED ABDOMINAL LOCATION: ICD-10-CM

## 2025-01-20 DIAGNOSIS — I10 ESSENTIAL HYPERTENSION: Primary | ICD-10-CM

## 2025-01-20 DIAGNOSIS — E78.2 MIXED HYPERLIPIDEMIA: ICD-10-CM

## 2025-01-20 PROCEDURE — 3074F SYST BP LT 130 MM HG: CPT | Performed by: INTERNAL MEDICINE

## 2025-01-20 PROCEDURE — 99212 OFFICE O/P EST SF 10 MIN: CPT | Performed by: INTERNAL MEDICINE

## 2025-01-20 PROCEDURE — 1126F AMNT PAIN NOTED NONE PRSNT: CPT | Performed by: INTERNAL MEDICINE

## 2025-01-20 PROCEDURE — 1170F FXNL STATUS ASSESSED: CPT | Performed by: INTERNAL MEDICINE

## 2025-01-20 PROCEDURE — 3078F DIAST BP <80 MM HG: CPT | Performed by: INTERNAL MEDICINE

## 2025-01-20 PROCEDURE — G0439 PPPS, SUBSEQ VISIT: HCPCS | Performed by: INTERNAL MEDICINE

## 2025-01-20 RX ORDER — DICYCLOMINE HYDROCHLORIDE 10 MG/1
10 CAPSULE ORAL 3 TIMES DAILY PRN
Qty: 90 CAPSULE | Refills: 3 | Status: SHIPPED | OUTPATIENT
Start: 2025-01-20

## 2025-01-20 NOTE — PROGRESS NOTES
Subjective   The ABCs of the Annual Wellness Visit  Medicare Wellness Visit      Estela Joshua is a 84 y.o. patient who presents for a Medicare Wellness Visit.    The following portions of the patient's history were reviewed and   updated as appropriate: allergies, current medications, past family history, past medical history, past social history, past surgical history, and problem list.    Compared to one year ago, the patient's physical   health is the same.  Compared to one year ago, the patient's mental   health is the same.    Recent Hospitalizations:  She was not admitted to the hospital during the last year.     Current Medical Providers:  Patient Care Team:  Robert Miner MD as PCP - General  Robert Miner MD as PCP - Family Medicine    Outpatient Medications Prior to Visit   Medication Sig Dispense Refill    albuterol sulfate  (90 Base) MCG/ACT inhaler Inhale 2 puffs Every 6 (Six) Hours As Needed for Wheezing. 18 g 5    alendronate (FOSAMAX) 70 MG tablet TAKE 1 TABLET BY MOUTH ONCE WEEKLY ON AN EMPTY STOMACH BEFORE BREAKFAST. REMAIN UPRIGHT FOR 30 MINUTES AND TAKE WITH 8 OUNCES OF WATER 12 tablet 3    amLODIPine (NORVASC) 2.5 MG tablet TAKE 1 TABLET BY MOUTH DAILY 90 tablet 0    atorvastatin (LIPITOR) 40 MG tablet TAKE ONE TABLET BY MOUTH ONCE NIGHTLY 90 tablet 3    BESIVANCE 0.6 % suspension ophthalmic suspension SHAKE LQ AND INT 1 GTT IN OD BID  1    budesonide-formoterol (Symbicort) 80-4.5 MCG/ACT inhaler Inhale 2 puffs 2 (Two) Times a Day. 3 each 3    diclofenac (VOLTAREN) 75 MG EC tablet Take 1 tablet by mouth 2 (Two) Times a Day With Meals. 180 tablet 1    fluticasone (FLONASE) 50 MCG/ACT nasal spray USE ONE SPRAY IN EACH NOSTRIL MORNING AND EVENING 16 mL 1    furosemide (LASIX) 20 MG tablet TAKE 1 TABLET BY MOUTH DAILY 90 tablet 0    Glucosamine HCl (GLUCOSAMINE PO) Take 50 mg by mouth Daily.      Multiple Vitamins-Minerals (EYE VITAMINS PO) Take  by mouth.      pantoprazole  "(PROTONIX) 40 MG EC tablet Take 1 tablet by mouth Daily. 90 tablet 3    sertraline (ZOLOFT) 100 MG tablet TAKE ONE TABLET BY MOUTH DAILY 90 tablet 3    spironolactone (ALDACTONE) 25 MG tablet Take 1 tablet by mouth Daily. 90 tablet 2    dicyclomine (BENTYL) 10 MG capsule Take 1 capsule by mouth 3 (Three) Times a Day As Needed for Abdominal Cramping. 20 capsule 0     No facility-administered medications prior to visit.     No opioid medication identified on active medication list. I have reviewed chart for other potential  high risk medication/s and harmful drug interactions in the elderly.      Aspirin is not on active medication list.  Aspirin use is not indicated based on review of current medical condition/s. Risk of harm outweighs potential benefits.  .    Patient Active Problem List   Diagnosis    Depression    Hyperlipidemia    Essential hypertension    Visual disturbance    Vitamin D deficiency    Imbalance    Impacted cerumen of right ear    Restrictive lung disease    Arthralgia    GERD (gastroesophageal reflux disease)    Arthritis of left knee    Arthritis of knee, right    Chronic bilateral low back pain without sciatica    Allergic rhinitis    Skin lesion    Osteopenia    Lung nodule    Pericardial effusion    Chronic heart failure with preserved ejection fraction (HFpEF)    Diarrhea     Advance Care Planning Advance Directive is not on file.  ACP discussion was held with the patient during this visit. Patient has an advance directive (not in EMR), copy requested.            Objective   Vitals:    01/20/25 1109   BP: 92/48   BP Location: Right arm   Patient Position: Sitting   Cuff Size: Adult   Pulse: 67   Resp: 16   Temp: 97.8 °F (36.6 °C)   TempSrc: Temporal   SpO2: 97%   Weight: 66.2 kg (146 lb)   Height: 160 cm (62.99\")   PainSc: 0-No pain       Estimated body mass index is 25.87 kg/m² as calculated from the following:    Height as of this encounter: 160 cm (62.99\").    Weight as of this encounter: " 66.2 kg (146 lb).    BMI is >= 25 and <30. (Overweight) The following options were offered after discussion;: exercise counseling/recommendations and nutrition counseling/recommendations           Does the patient have evidence of cognitive impairment? No  Lab Results   Component Value Date    CHLPL 160 2025    TRIG 156 (H) 2025    HDL 37 (L) 2025    LDL 96 2025    VLDL 27 2025                                                                                                Health  Risk Assessment    Smoking Status:  Social History     Tobacco Use   Smoking Status Former    Current packs/day: 0.00    Average packs/day: 1 pack/day for 20.0 years (20.0 ttl pk-yrs)    Types: Cigarettes    Start date:     Quit date:     Years since quittin.0   Smokeless Tobacco Never     Alcohol Consumption:  Social History     Substance and Sexual Activity   Alcohol Use Yes    Alcohol/week: 2.0 standard drinks of alcohol    Types: 1 Glasses of wine, 1 Cans of beer per week       Fall Risk Screen  STEADI Fall Risk Assessment was completed, and patient is at LOW risk for falls.Assessment completed on:2025    Depression Screening   Little interest or pleasure in doing things? Not at all   Feeling down, depressed, or hopeless? Not at all   PHQ-2 Total Score 0      Health Habits and Functional and Cognitive Screenin/20/2025    11:40 AM   Functional & Cognitive Status   Do you have difficulty preparing food and eating? No   Do you have difficulty bathing yourself, getting dressed or grooming yourself? No   Do you have difficulty using the toilet? No   Do you have difficulty moving around from place to place? No   Do you have trouble with steps or getting out of a bed or a chair? Yes   Current Diet Well Balanced Diet   Dental Exam Up to date   Eye Exam Up to date   Exercise (times per week) 0 times per week   Current Exercises Include No Regular Exercise   Do you need help using the phone?   No   Are you deaf or do you have serious difficulty hearing?  Yes   Do you need help to go to places out of walking distance? No   Do you need help shopping? No   Do you need help preparing meals?  No   Do you need help with housework?  No   Do you need help with laundry? No   Do you need help taking your medications? No   Do you need help managing money? No   Do you ever drive or ride in a car without wearing a seat belt? No   Have you felt unusual stress, anger or loneliness in the last month? No   Who do you live with? Other   If you need help, do you have trouble finding someone available to you? No   Have you been bothered in the last four weeks by sexual problems? No   Do you have difficulty concentrating, remembering or making decisions? Yes           Age-appropriate Screening Schedule:  Refer to the list below for future screening recommendations based on patient's age, sex and/or medical conditions. Orders for these recommended tests are listed in the plan section. The patient has been provided with a written plan.    Health Maintenance List  Health Maintenance   Topic Date Due    BMI FOLLOWUP  01/04/2025    DXA SCAN  06/19/2025    LIPID PANEL  01/13/2026    ANNUAL WELLNESS VISIT  01/20/2026    COLORECTAL CANCER SCREENING  09/01/2027    TDAP/TD VACCINES (2 - Td or Tdap) 09/01/2028    COVID-19 Vaccine  Completed    RSV Vaccine - Adults  Completed    INFLUENZA VACCINE  Completed    Pneumococcal Vaccine 65+  Completed    ZOSTER VACCINE  Completed    MAMMOGRAM  Discontinued    LUNG CANCER SCREENING  Discontinued                                                                                                                                                CMS Preventative Services Quick Reference  Risk Factors Identified During Encounter  None Identified    The above risks/problems have been discussed with the patient.  Pertinent information has been shared with the patient in the After Visit Summary.  An After  Visit Summary and PPPS were made available to the patient.    Follow Up:   Next Medicare Wellness visit to be scheduled in 1 year.         Additional E&M Note during same encounter follows:  Patient has additional, significant, and separately identifiable condition(s)/problem(s) that require work above and beyond the Medicare Wellness Visit     Chief Complaint  Medicare Wellness-subsequent, Dizziness, and Hypertension    Subjective    HPI         The patient is an 84-year-old female here for an annual wellness visit, blood pressure check, and lab follow-up.    She reports experiencing lightheadedness and fatigue, which she attributes to low blood pressure. She does not monitor her blood pressure regularly but notes that it occasionally drops below 100. She has not experienced any episodes of syncope upon standing, except for today. She has not attempted to take her antihypertensive medication at night. She has not experienced these symptoms in the past 3 months. Her current medication regimen includes amlodipine 2.5 mg, taken in the morning.    She continues to experience diarrhea, which she describes as being almost clear with minimal fecal matter. She suspects that her symptoms may be stress-related. She does not experience any abdominal pain but reports audible bowel sounds. Her diet includes bran 3 to 4 times a week, along with fruit and salad. She has not undergone a colonoscopy since 2017. She has not maintained a food diary. She had a bout of diarrhea yesterday but not today. She occasionally consumes yogurt. She reports no presence of blood in her stool. She has been prescribed dicyclomine once daily for her gastrointestinal symptoms but is not taking it.    She maintains a healthy diet, primarily consuming home-cooked meals. She engages in physical activity by caring for her grandchildren, aged 4 and 8, and performing household chores. She experiences back pain when cleaning the floors. She has been  "noticing some memory issues recently. She does not consume alcohol excessively, is not in chronic pain, does not suffer from depression, and does not use illicit drugs. She has macular degeneration and regularly visits an ophthalmologist. She uses hearing aids, which she finds beneficial. She receives dental care biannually and practices daily oral hygiene, including flossing. She takes a multivitamin supplement but does not take additional vitamin D3. She has previously tested negative for celiac disease. She has not been hospitalized within the past year. She does not take aspirin. She has a living will and advanced directive.    SOCIAL HISTORY  She does not drink a lot of alcohol. She does not use drugs.    MEDICATIONS  amlodipine, dicyclomine    IMMUNIZATIONS  She received the RSV vaccine in 2023. She received a COVID-19 vaccine on 08/29/2024. She received the Prevnar 20 in July. She received the shingles vaccine in 2021. Her tetanus, diphtheria, and pertussis vaccine was in 2018, which is good for 10 years. She received the influenza vaccine at the same time as one of her other vaccines.          Objective   Vital Signs:  BP 92/48 (BP Location: Right arm, Patient Position: Sitting, Cuff Size: Adult)   Pulse 67   Temp 97.8 °F (36.6 °C) (Temporal)   Resp 16   Ht 160 cm (62.99\")   Wt 66.2 kg (146 lb)   SpO2 97%   BMI 25.87 kg/m²   Physical Exam  Vitals reviewed.   Constitutional:       Appearance: Normal appearance.   Neck:      Vascular: No carotid bruit.   Cardiovascular:      Rate and Rhythm: Normal rate and regular rhythm.      Heart sounds: Normal heart sounds.      Comments: 95/0  Pulmonary:      Effort: Pulmonary effort is normal.      Breath sounds: Normal breath sounds.   Musculoskeletal:      Right lower leg: No edema.      Left lower leg: No edema.   Neurological:      Mental Status: She is alert.   Psychiatric:         Mood and Affect: Mood normal.         Behavior: Behavior normal.         " Thought Content: Thought content normal.         Judgment: Judgment normal.           Vital Signs  Blood pressure reading is 92/48.            Results  Laboratory Studies  BUN was 29 and creatinine was 1.08 six months ago. Glucose was normal at 86 seven days ago. BUN and creatinine are completely normal. Liver tests are all normal. Hemoglobin is 11.8. TSH is normal. Celiac disease test was negative. Stool culture for E. coli, Campylobacter, salmonella, Shigella was negative.              Assessment and Plan        1. Hypotension.  Her blood pressure readings have been consistently low, with today's reading at 92/48. She reports feeling lightheaded and very tired. She is currently taking amlodipine 2.5 mg in the morning. She is advised to ensure adequate hydration. If symptoms persist, she may consider taking her medication at night instead of during the day.    2. Chronic diarrhea.  She reports ongoing diarrhea with periods of clear fluid and occasional formed stools. Previous stool cultures for E. coli, Campylobacter, salmonella, and Shigella were negative. She is advised to maintain a food diary to identify potential triggers. She will continue her current regimen of dicyclomine, increasing the frequency to twice daily.. She is instructed to communicate via [employer] regarding the effectiveness of dicyclomine. If dicyclomine is ineffective, she will start cholestyramine 1 pack daily.    3. Anemia.  Her hemoglobin level is slightly below normal at 11.8, indicating mild anemia. Previous levels were 11.3 over the last three times in the past 6 months. Thirteen months ago, her hemoglobin was 12.9, which is normal. A repeat blood count will be ordered in 1 month to monitor her hemoglobin levels. If levels decrease, further investigation will be necessary.    4. Health maintenance.  She received the RSV vaccine in 2023, the COVID-19 vaccine on 08/29/2024, the Prevnar 20 in July 2024, the shingles vaccine in 2021, and the  tetanus, diphtheria, and pertussis vaccine in 2018. She is slightly anemic with a hemoglobin level of 11.8. Her TSH is normal, and she tested negative for celiac disease. Stool culture for E. coli, Campylobacter, salmonella, Shigella was negative. She is advised to take a vitamin D3 supplement in addition to her current multivitamin.    Follow-up  The patient will follow up in 1 month.            Follow Up   No follow-ups on file.  Patient was given instructions and counseling regarding her condition or for health maintenance advice. Please see specific information pulled into the AVS if appropriate.  Patient or patient representative verbalized consent for the use of Ambient Listening during the visit with  Robert Miner MD for chart documentation. 1/20/2025  11:44 EST

## 2025-01-24 ENCOUNTER — OFFICE VISIT (OUTPATIENT)
Dept: CARDIOLOGY | Facility: CLINIC | Age: 85
End: 2025-01-24
Payer: MEDICARE

## 2025-01-24 VITALS
HEIGHT: 63 IN | SYSTOLIC BLOOD PRESSURE: 106 MMHG | HEART RATE: 68 BPM | OXYGEN SATURATION: 98 % | DIASTOLIC BLOOD PRESSURE: 56 MMHG | WEIGHT: 147 LBS | BODY MASS INDEX: 26.05 KG/M2

## 2025-01-24 DIAGNOSIS — I10 ESSENTIAL HYPERTENSION: ICD-10-CM

## 2025-01-24 DIAGNOSIS — I50.32 CHRONIC HEART FAILURE WITH PRESERVED EJECTION FRACTION (HFPEF): Primary | ICD-10-CM

## 2025-01-24 DIAGNOSIS — I25.84 CORONARY ARTERY CALCIFICATION OF NATIVE ARTERY: ICD-10-CM

## 2025-01-24 DIAGNOSIS — I25.10 CORONARY ARTERY CALCIFICATION OF NATIVE ARTERY: ICD-10-CM

## 2025-01-24 RX ORDER — AMLODIPINE BESYLATE 2.5 MG/1
2.5 TABLET ORAL DAILY
Qty: 90 TABLET | Refills: 0 | Status: SHIPPED | OUTPATIENT
Start: 2025-01-24 | End: 2025-01-24

## 2025-01-24 NOTE — PROGRESS NOTES
PATIENTINFORMATION    Date of Office Visit: 2025  Encounter Provider: Ranulfo Kirby MD  Place of Service: [facility] CARDIOLOGY  Patient Name: Estela Joshua  : 1940    Subjective:     Encounter Date:2025      Patient ID: Estela Joshua is a 84 y.o. female.    Chief Complaint   Patient presents with    Congestive Heart Failure       HPI  Ms. Joshua is a pleasant 84 years old lady who came to cardiac clinic for follow-up visit.  She has chronic intermittent fatigue, dizzy spells likely from hypotension.  She is fairly active doing chores and denies rest or exertional chest discomfort.  No significant change in breathing.  No orthopnea, PND, palpitations, or syncope.  No recent ER visit  She reports good urine output on current diuretic regimen.      ROS  All systems reviewed and negative except as noted in HPI.    Past Medical History:   Diagnosis Date    COPD (chronic obstructive pulmonary disease)     Depression     GERD (gastroesophageal reflux disease)     Hyperglycemia     Hyperlipidemia     Hypertension     Macular degeneration     Osteoarthritis        Past Surgical History:   Procedure Laterality Date    APPENDECTOMY      BREAST EXCISIONAL BIOPSY Right     ; benign    CARDIAC CATHETERIZATION      CARDIAC CATHETERIZATION N/A 2017    Procedure: Right and Left Heart Cath;  Surgeon: Pramod Salvador MD;  Location: [facility] CATH INVASIVE LOCATION;  Service:     CARDIAC CATHETERIZATION N/A 2017    Procedure: Coronary angiography;  Surgeon: Pramod Salvador MD;  Location: [facility] CATH INVASIVE LOCATION;  Service:     CARDIAC CATHETERIZATION N/A 2017    Procedure: Left ventriculography;  Surgeon: Pramod Salvador MD;  Location: [facility] CATH INVASIVE LOCATION;  Service:     CATARACT EXTRACTION      COLONOSCOPY N/A 2017    Procedure: COLONOSCOPY TO CECUM;  Surgeon: Siddhartha Mujica MD;  Location: [facility] ENDOSCOPY;  Service:     ENDOSCOPY  "N/A 2017    Procedure: ESOPHAGOGASTRODUODENOSCOPY WITH COLD BIOPSIES;  Surgeon: Siddhartha Mujica MD;  Location: [facility] ENDOSCOPY;  Service:     LAPAROSCOPIC TUBAL LIGATION         Social History     Socioeconomic History    Marital status:    Tobacco Use    Smoking status: Former     Current packs/day: 0.00     Average packs/day: 1 pack/day for 20.0 years (20.0 ttl pk-yrs)     Types: Cigarettes     Start date:      Quit date:      Years since quittin.0    Smokeless tobacco: Never   Vaping Use    Vaping status: Never Used   Substance and Sexual Activity    Alcohol use: Yes     Alcohol/week: 2.0 standard drinks of alcohol     Types: 1 Glasses of wine, 1 Cans of beer per week    Drug use: No    Sexual activity: Defer       Family History   Problem Relation Age of Onset    Alzheimer's disease Mother     Stroke Mother     Heart failure Father          Procedures       Objective:     /56 (BP Location: Right arm, Patient Position: Sitting, Cuff Size: Adult)   Pulse 68   Ht 160 cm (62.99\")   Wt 66.7 kg (147 lb)   SpO2 98%   BMI 26.05 kg/m²  Body mass index is 26.05 kg/m².     Constitutional:       General: Not in acute distress.     Appearance: Well-developed. Not diaphoretic.   Eyes:      Pupils: Pupils are equal, round, and reactive to light.   HENT:      Head: Normocephalic and atraumatic.   Neck:      Thyroid: No thyromegaly.   Pulmonary:      Effort: Pulmonary effort is normal. No respiratory distress.      Breath sounds: Normal breath sounds. No wheezing. No rales.   Chest:      Chest wall: Not tender to palpatation.   Cardiovascular:      Normal rate. Regular rhythm.      No gallop.    Pulses:     Intact distal pulses.   Edema:     Peripheral edema absent.   Abdominal:      General: Bowel sounds are normal. There is no distension.      Palpations: Abdomen is soft.      Tenderness: There is no guarding.   Musculoskeletal: Normal range of motion.         General: No deformity.      " Cervical back: Normal range of motion and neck supple. Skin:     General: Skin is warm and dry.      Findings: No rash.   Neurological:      Mental Status: Alert and oriented to person, place, and time.      Cranial Nerves: No cranial nerve deficit.      Deep Tendon Reflexes: Reflexes are normal and symmetric.   Psychiatric:         Judgment: Judgment normal.       Review Of Data: I have reviewed pertinent recent labs, images and documents and pertinent findings included in HPI or assessment below.    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  LDL is at goal.  Coronary artery calcification of LAD noted on CT chest.  Reportedly normal coronary angiogram with absent right coronary artery stenting 2017.  Normal myocardial perfusion study in March 2023.  Aspirin discontinued because of daily diclofenac use.  Chronic osteoarthritic pain for which she has been taking diclofenac 75 mg p.o. twice daily.  Chronic intermittent dizzy spells and fatigue likely from hypotension.    I am concerned about intermittent hypotension.  During recent visit with PCP noted to have low blood pressure after which amlodipine dose decreased.  She still has low diastolic BP during office visit today.  She will hold amlodipine and monitor blood pressure at home till she gets to see Dr Monalisa Kaur in 6 months or sooner with concerns     Diagnosis and plan of care discussed with patient and verbalized understanding.            Your  medication list            Accurate as of January 24, 2025 10:38 AM. If you have any questions, ask your nurse or doctor.                CONTINUE taking these medications        Instructions Last Dose Given Next Dose Due   albuterol sulfate  (90 Base) MCG/ACT inhaler  Commonly known as: PROVENTIL HFA;VENTOLIN HFA;PROAIR HFA      Inhale 2 puffs Every 6 (Six) Hours As Needed for Wheezing.       alendronate 70 MG tablet  Commonly known as: FOSAMAX      TAKE 1 TABLET BY MOUTH ONCE WEEKLY ON AN EMPTY STOMACH BEFORE BREAKFAST. REMAIN UPRIGHT FOR 30 MINUTES AND TAKE WITH 8 OUNCES OF WATER       amLODIPine 2.5 MG tablet  Commonly known as: NORVASC      TAKE 1 TABLET BY MOUTH DAILY       atorvastatin 40 MG tablet  Commonly known as: LIPITOR      TAKE ONE TABLET BY MOUTH ONCE NIGHTLY       Besivance 0.6 % suspension ophthalmic suspension  Generic drug: besifloxacin      SHAKE LQ AND INT 1 GTT IN OD BID       budesonide-formoterol 80-4.5 MCG/ACT inhaler  Commonly known as: Symbicort      Inhale 2 puffs 2 (Two) Times a Day.       CBD oral oil  Commonly known as: cannabidiol      Take 2 drops by mouth Daily.       diclofenac 75 MG EC tablet  Commonly known as: VOLTAREN      Take 1 tablet by mouth 2 (Two) Times a Day With Meals.       dicyclomine 10 MG capsule  Commonly known as: BENTYL      Take 1 capsule by mouth 3 (Three) Times a Day As Needed for Abdominal Cramping.       EYE VITAMINS PO      Take  by mouth.       fluticasone 50 MCG/ACT nasal spray  Commonly known as: FLONASE      USE ONE SPRAY IN EACH NOSTRIL MORNING AND EVENING       furosemide 20 MG tablet  Commonly known as: LASIX      TAKE 1 TABLET BY MOUTH DAILY       GLUCOSAMINE PO      Take 50 mg by mouth Daily.       pantoprazole 40 MG EC tablet  Commonly known as: PROTONIX      Take 1 tablet by mouth Daily.       sertraline 100 MG tablet  Commonly known as: ZOLOFT      TAKE ONE TABLET BY MOUTH DAILY       spironolactone 25 MG tablet  Commonly known as:  ALDACTONE      Take 1 tablet by mouth Daily.                    Ranulfo Kirby MD  01/24/25  10:38 EST

## 2025-02-12 DIAGNOSIS — I10 ESSENTIAL HYPERTENSION: Primary | ICD-10-CM

## 2025-02-12 DIAGNOSIS — E78.2 MIXED HYPERLIPIDEMIA: ICD-10-CM

## 2025-02-12 DIAGNOSIS — R10.9 ABDOMINAL PAIN, UNSPECIFIED ABDOMINAL LOCATION: ICD-10-CM

## 2025-02-12 DIAGNOSIS — I10 PRIMARY HYPERTENSION: ICD-10-CM

## 2025-02-14 LAB
ALBUMIN SERPL-MCNC: 4.2 G/DL (ref 3.5–5.2)
ALBUMIN/GLOB SERPL: 1.5 G/DL
ALP SERPL-CCNC: 43 U/L (ref 39–117)
ALT SERPL-CCNC: 13 U/L (ref 1–33)
AST SERPL-CCNC: 16 U/L (ref 1–32)
BASOPHILS # BLD AUTO: 0.03 10*3/MM3 (ref 0–0.2)
BASOPHILS NFR BLD AUTO: 0.3 % (ref 0–1.5)
BILIRUB SERPL-MCNC: 0.2 MG/DL (ref 0–1.2)
BUN SERPL-MCNC: 27 MG/DL (ref 8–23)
BUN/CREAT SERPL: 23.7 (ref 7–25)
CALCIUM SERPL-MCNC: 9.7 MG/DL (ref 8.6–10.5)
CHLORIDE SERPL-SCNC: 109 MMOL/L (ref 98–107)
CHOLEST SERPL-MCNC: 171 MG/DL (ref 0–200)
CO2 SERPL-SCNC: 25.4 MMOL/L (ref 22–29)
CREAT SERPL-MCNC: 1.14 MG/DL (ref 0.57–1)
EGFRCR SERPLBLD CKD-EPI 2021: 47.6 ML/MIN/1.73
EOSINOPHIL # BLD AUTO: 0.05 10*3/MM3 (ref 0–0.4)
EOSINOPHIL NFR BLD AUTO: 0.6 % (ref 0.3–6.2)
ERYTHROCYTE [DISTWIDTH] IN BLOOD BY AUTOMATED COUNT: 12.5 % (ref 12.3–15.4)
GLOBULIN SER CALC-MCNC: 2.8 GM/DL
GLUCOSE SERPL-MCNC: 98 MG/DL (ref 65–99)
HCT VFR BLD AUTO: 36.5 % (ref 34–46.6)
HDLC SERPL-MCNC: 41 MG/DL (ref 40–60)
HGB BLD-MCNC: 12.3 G/DL (ref 12–15.9)
IMM GRANULOCYTES # BLD AUTO: 0.03 10*3/MM3 (ref 0–0.05)
IMM GRANULOCYTES NFR BLD AUTO: 0.3 % (ref 0–0.5)
LDLC SERPL CALC-MCNC: 100 MG/DL (ref 0–100)
LDLC/HDLC SERPL: 2.33 {RATIO}
LYMPHOCYTES # BLD AUTO: 1.47 10*3/MM3 (ref 0.7–3.1)
LYMPHOCYTES NFR BLD AUTO: 17.1 % (ref 19.6–45.3)
MCH RBC QN AUTO: 31.2 PG (ref 26.6–33)
MCHC RBC AUTO-ENTMCNC: 33.7 G/DL (ref 31.5–35.7)
MCV RBC AUTO: 92.6 FL (ref 79–97)
MONOCYTES # BLD AUTO: 0.53 10*3/MM3 (ref 0.1–0.9)
MONOCYTES NFR BLD AUTO: 6.2 % (ref 5–12)
NEUTROPHILS # BLD AUTO: 6.47 10*3/MM3 (ref 1.7–7)
NEUTROPHILS NFR BLD AUTO: 75.5 % (ref 42.7–76)
NRBC BLD AUTO-RTO: 0 /100 WBC (ref 0–0.2)
PLATELET # BLD AUTO: 216 10*3/MM3 (ref 140–450)
POTASSIUM SERPL-SCNC: 5.2 MMOL/L (ref 3.5–5.2)
PROT SERPL-MCNC: 7 G/DL (ref 6–8.5)
RBC # BLD AUTO: 3.94 10*6/MM3 (ref 3.77–5.28)
SODIUM SERPL-SCNC: 142 MMOL/L (ref 136–145)
TRIGL SERPL-MCNC: 173 MG/DL (ref 0–150)
TSH SERPL DL<=0.005 MIU/L-ACNC: 1.78 UIU/ML (ref 0.27–4.2)
VLDLC SERPL CALC-MCNC: 30 MG/DL (ref 5–40)
WBC # BLD AUTO: 8.58 10*3/MM3 (ref 3.4–10.8)

## 2025-02-24 NOTE — TELEPHONE ENCOUNTER
Caller: Estela Joshua    Relationship to patient: Self    Best call back number: [phone]    Patient is needing: PATIENT CALLING STATING THAT SHE HAS 2 DAYS OF THE DICLOFENAC REMAINING AND REQUESTING A REFILL.

## 2025-02-25 RX ORDER — DICLOFENAC SODIUM 75 MG/1
75 TABLET, DELAYED RELEASE ORAL 2 TIMES DAILY WITH MEALS
Qty: 180 TABLET | Refills: 1 | Status: SHIPPED | OUTPATIENT
Start: 2025-02-25

## 2025-02-25 NOTE — TELEPHONE ENCOUNTER
Rx Refill Note  Requested Prescriptions     Pending Prescriptions Disp Refills    diclofenac (VOLTAREN) 75 MG EC tablet [Pharmacy Med Name: DICLOFENAC SOD EC 75 MG TAB] 180 tablet 1     Sig: TAKE 1 TABLET BY MOUTH TWICE A DAY WITH A MEAL      Last office visit with prescribing clinician: 1/20/2025   Last telemedicine visit with prescribing clinician: Visit date not found   Next office visit with prescribing clinician: 2/28/2025                         Would you like a call back once the refill request has been completed: [] Yes [] No    If the office needs to give you a call back, can they leave a voicemail: [] Yes [] No    Blanca Romero MA  02/25/25, 08:48 EST

## 2025-02-28 ENCOUNTER — OFFICE VISIT (OUTPATIENT)
Dept: FAMILY MEDICINE CLINIC | Facility: CLINIC | Age: 85
End: 2025-02-28
Payer: MEDICARE

## 2025-02-28 VITALS
RESPIRATION RATE: 16 BRPM | HEIGHT: 63 IN | OXYGEN SATURATION: 92 % | DIASTOLIC BLOOD PRESSURE: 56 MMHG | HEART RATE: 102 BPM | SYSTOLIC BLOOD PRESSURE: 104 MMHG | WEIGHT: 142 LBS | BODY MASS INDEX: 25.16 KG/M2

## 2025-02-28 DIAGNOSIS — R19.7 DIARRHEA, UNSPECIFIED TYPE: ICD-10-CM

## 2025-02-28 DIAGNOSIS — I10 ESSENTIAL HYPERTENSION: Primary | ICD-10-CM

## 2025-02-28 PROCEDURE — 3074F SYST BP LT 130 MM HG: CPT | Performed by: INTERNAL MEDICINE

## 2025-02-28 PROCEDURE — 3078F DIAST BP <80 MM HG: CPT | Performed by: INTERNAL MEDICINE

## 2025-02-28 PROCEDURE — 99213 OFFICE O/P EST LOW 20 MIN: CPT | Performed by: INTERNAL MEDICINE

## 2025-02-28 PROCEDURE — 1126F AMNT PAIN NOTED NONE PRSNT: CPT | Performed by: INTERNAL MEDICINE

## 2025-02-28 NOTE — PROGRESS NOTES
Juan Manuel Joshua is a 84 y.o. female. Patient is here today for   Chief Complaint   Patient presents with    Hypertension    Follow-up          Vitals:    25 0926   BP: 104/56   Pulse: 102   Resp: 16   SpO2: 92%     Body mass index is 25.16 kg/m².    The following portions of the patient's history were reviewed and updated as appropriate: allergies, current medications, past family history, past medical history, past social history, past surgical history and problem list.    Past Medical History:   Diagnosis Date    COPD (chronic obstructive pulmonary disease)     Depression     GERD (gastroesophageal reflux disease)     Hyperglycemia     Hyperlipidemia     Hypertension     Macular degeneration     Osteoarthritis       No Known Allergies   Social History     Socioeconomic History    Marital status:    Tobacco Use    Smoking status: Former     Current packs/day: 0.00     Average packs/day: 1 pack/day for 20.0 years (20.0 ttl pk-yrs)     Types: Cigarettes     Start date:      Quit date:      Years since quittin.1    Smokeless tobacco: Never   Vaping Use    Vaping status: Never Used   Substance and Sexual Activity    Alcohol use: Yes     Alcohol/week: 2.0 standard drinks of alcohol     Types: 1 Glasses of wine, 1 Cans of beer per week    Drug use: No    Sexual activity: Defer        Current Outpatient Medications:     albuterol sulfate  (90 Base) MCG/ACT inhaler, Inhale 2 puffs Every 6 (Six) Hours As Needed for Wheezing., Disp: 18 g, Rfl: 5    alendronate (FOSAMAX) 70 MG tablet, TAKE 1 TABLET BY MOUTH ONCE WEEKLY ON AN EMPTY STOMACH BEFORE BREAKFAST. REMAIN UPRIGHT FOR 30 MINUTES AND TAKE WITH 8 OUNCES OF WATER, Disp: 12 tablet, Rfl: 3    atorvastatin (LIPITOR) 40 MG tablet, TAKE ONE TABLET BY MOUTH ONCE NIGHTLY, Disp: 90 tablet, Rfl: 3    BESIVANCE 0.6 % suspension ophthalmic suspension, SHAKE LQ AND INT 1 GTT IN OD BID, Disp: , Rfl: 1    budesonide-formoterol (Symbicort)  80-4.5 MCG/ACT inhaler, Inhale 2 puffs 2 (Two) Times a Day., Disp: 3 each, Rfl: 3    CBD (cannabidiol) oral oil, Take 2 drops by mouth Daily., Disp: , Rfl:     diclofenac (VOLTAREN) 75 MG EC tablet, TAKE 1 TABLET BY MOUTH TWICE A DAY WITH A MEAL, Disp: 180 tablet, Rfl: 1    dicyclomine (BENTYL) 10 MG capsule, Take 1 capsule by mouth 3 (Three) Times a Day As Needed for Abdominal Cramping., Disp: 90 capsule, Rfl: 3    fluticasone (FLONASE) 50 MCG/ACT nasal spray, USE ONE SPRAY IN EACH NOSTRIL MORNING AND EVENING, Disp: 16 mL, Rfl: 1    furosemide (LASIX) 20 MG tablet, TAKE 1 TABLET BY MOUTH DAILY, Disp: 90 tablet, Rfl: 0    Glucosamine HCl (GLUCOSAMINE PO), Take 50 mg by mouth Daily., Disp: , Rfl:     Multiple Vitamins-Minerals (EYE VITAMINS PO), Take  by mouth., Disp: , Rfl:     pantoprazole (PROTONIX) 40 MG EC tablet, Take 1 tablet by mouth Daily., Disp: 90 tablet, Rfl: 3    sertraline (ZOLOFT) 100 MG tablet, TAKE ONE TABLET BY MOUTH DAILY, Disp: 90 tablet, Rfl: 3    spironolactone (ALDACTONE) 25 MG tablet, Take 1 tablet by mouth Daily., Disp: 90 tablet, Rfl: 2     Objective     History of Present Illness   History of Present Illness  The patient is an 84-year-old female here for a 1-month follow-up.    She has been monitoring her blood pressure at home, with readings consistently ranging between 104 and 112, predominantly closer to 104. She reports no episodes of lightheadedness. She has been making efforts to increase her water intake. She has discontinued the use of amlodipine since her last visit.    She experienced a period of stability in her bowel movements for approximately 2 to 3 weeks following her last visit. However, she recently had a 3-day episode of constipation, followed by a bout of watery diarrhea. Her bowel movements have been small in volume, occurring once every 2 to 3 days. She has not had a bowel movement today. She experiences crampy pain around her navel during these episodes. She consumes a  couple of cups of coffee in the morning and an additional cup at lunch if the weather is cold. She also drinks tea. She has expressed a desire to switch to decaffeinated coffee. She supplements her diet with Metamucil wafers, consuming a few if she skips her cereal. She has been managing her symptoms with dicyclomine, taken as needed for loose stools, which she reports as effective. She has also been taking dicyclomine prophylactically every morning.    MEDICATIONS  Current: dicyclomine  Discontinued: amlodipine    Review of Systems   Constitutional:  Negative for unexpected weight change.       Physical Exam  Physical Exam  Vital Signs  Blood pressure is 122/60.    Results  Laboratory Studies  Hemoglobin is normal 12.3. TSH level was normal. Creatinine and BUN were elevated due to dehydration.       Assessment    ASSESSMENT    Problems Addressed this Visit          Cardiac and Vasculature    Essential hypertension - Primary       Gastrointestinal Abdominal     Diarrhea     Diagnoses         Codes Comments    Essential hypertension    -  Primary ICD-10-CM: I10  ICD-9-CM: 401.9     Diarrhea, unspecified type     ICD-10-CM: R19.7  ICD-9-CM: 787.91           Assessment & Plan  1. Hypertension.  Her blood pressure readings have been stable, ranging between 104 and 112, with most readings closer to 104. She has not experienced any lightheadedness. She has been drinking more water to address previous dehydration. Her hemoglobin levels have normalized at 12.3, and her thyroid function tests, including TSH, are within normal limits. She was previously dehydrated, leading to elevated creatinine and BUN levels, but these have since normalized. She will continue monitoring her blood pressure at home. She has stopped taking amlodipine as per her cardiologist's advice.    2. Diarrhea.  She reports improvement in her diarrhea symptoms, with only occasional watery stools. She has been taking dicyclomine (Bentyl) regularly every  morning, which has been effective. She experiences crampy pain around her navel during these episodes. She consumes a couple of cups of coffee in the morning and an additional cup at lunch if the weather is cold. She also drinks tea. She has expressed a desire to switch to decaffeinated coffee. She supplements her diet with Metamucil wafers, consuming a few if she skips her cereal. She was advised to reduce her caffeine intake, as it may be contributing to her symptoms. She was also encouraged to maintain a high-fiber diet. If symptoms persist, cholestyramine may be considered.    PLAN  There are no Patient Instructions on file for this visit.  Return in about 6 months (around 8/28/2025) for cmp, lipid, cbc.    Patient or patient representative verbalized consent for the use of Ambient Listening during the visit with  Robert Miner MD for chart documentation. 2/28/2025  09:49 EST

## 2025-03-06 RX ORDER — FUROSEMIDE 20 MG/1
20 TABLET ORAL DAILY
Qty: 90 TABLET | Refills: 0 | Status: SHIPPED | OUTPATIENT
Start: 2025-03-06

## 2025-03-06 RX ORDER — SERTRALINE HYDROCHLORIDE 100 MG/1
100 TABLET, FILM COATED ORAL DAILY
Qty: 90 TABLET | Refills: 3 | Status: SHIPPED | OUTPATIENT
Start: 2025-03-06

## 2025-03-14 RX ORDER — ATORVASTATIN CALCIUM 40 MG/1
40 TABLET, FILM COATED ORAL NIGHTLY
Qty: 90 TABLET | Refills: 0 | Status: SHIPPED | OUTPATIENT
Start: 2025-03-14

## 2025-03-24 RX ORDER — ALENDRONATE SODIUM 70 MG/1
70 TABLET ORAL WEEKLY
Qty: 12 TABLET | Refills: 3 | Status: SHIPPED | OUTPATIENT
Start: 2025-03-24

## 2025-04-21 RX ORDER — SPIRONOLACTONE 25 MG/1
25 TABLET ORAL DAILY
Qty: 90 TABLET | Refills: 1 | Status: SHIPPED | OUTPATIENT
Start: 2025-04-21

## 2025-06-05 RX ORDER — FUROSEMIDE 20 MG/1
20 TABLET ORAL DAILY
Qty: 90 TABLET | Refills: 0 | Status: SHIPPED | OUTPATIENT
Start: 2025-06-05

## 2025-06-30 RX ORDER — AMLODIPINE BESYLATE 2.5 MG/1
2.5 TABLET ORAL DAILY
Qty: 90 TABLET | Refills: 0 | OUTPATIENT
Start: 2025-06-30

## 2025-06-30 NOTE — TELEPHONE ENCOUNTER
Pt called back in to let us that she's not taking amlodipine any longer.  She thinks this was stopped after LOV.    Briana ORO RN  Triage [facility] [place]  06/30/25 12:21 EDT

## 2025-08-12 RX ORDER — PANTOPRAZOLE SODIUM 40 MG/1
40 TABLET, DELAYED RELEASE ORAL DAILY
Qty: 90 TABLET | Refills: 3 | Status: SHIPPED | OUTPATIENT
Start: 2025-08-12

## 2025-08-27 DIAGNOSIS — E78.2 MIXED HYPERLIPIDEMIA: Primary | ICD-10-CM

## 2025-08-27 DIAGNOSIS — I10 PRIMARY HYPERTENSION: ICD-10-CM

## 2025-08-27 DIAGNOSIS — I10 ESSENTIAL HYPERTENSION: ICD-10-CM

## 2025-08-28 LAB
ALBUMIN SERPL-MCNC: 4.2 G/DL (ref 3.5–5.2)
ALBUMIN/GLOB SERPL: 1.9 G/DL
ALP SERPL-CCNC: 38 U/L (ref 39–117)
ALT SERPL-CCNC: 17 U/L (ref 1–33)
AST SERPL-CCNC: 21 U/L (ref 1–32)
BASOPHILS # BLD AUTO: 0.04 10*3/MM3 (ref 0–0.2)
BASOPHILS NFR BLD AUTO: 0.6 % (ref 0–1.5)
BILIRUB SERPL-MCNC: 0.4 MG/DL (ref 0–1.2)
BUN SERPL-MCNC: 17 MG/DL (ref 8–23)
BUN/CREAT SERPL: 17.7 (ref 7–25)
CALCIUM SERPL-MCNC: 10 MG/DL (ref 8.6–10.5)
CHLORIDE SERPL-SCNC: 107 MMOL/L (ref 98–107)
CHOLEST SERPL-MCNC: 165 MG/DL (ref 0–200)
CO2 SERPL-SCNC: 25.5 MMOL/L (ref 22–29)
CREAT SERPL-MCNC: 0.96 MG/DL (ref 0.57–1)
EGFRCR SERPLBLD CKD-EPI 2021: 58.5 ML/MIN/1.73
EOSINOPHIL # BLD AUTO: 0.07 10*3/MM3 (ref 0–0.4)
EOSINOPHIL NFR BLD AUTO: 1 % (ref 0.3–6.2)
ERYTHROCYTE [DISTWIDTH] IN BLOOD BY AUTOMATED COUNT: 12.9 % (ref 12.3–15.4)
GLOBULIN SER CALC-MCNC: 2.2 GM/DL
GLUCOSE SERPL-MCNC: 94 MG/DL (ref 65–99)
HCT VFR BLD AUTO: 36.7 % (ref 34–46.6)
HDLC SERPL-MCNC: 43 MG/DL (ref 40–60)
HGB BLD-MCNC: 11.8 G/DL (ref 12–15.9)
IMM GRANULOCYTES # BLD AUTO: 0.02 10*3/MM3 (ref 0–0.05)
IMM GRANULOCYTES NFR BLD AUTO: 0.3 % (ref 0–0.5)
LDLC SERPL CALC-MCNC: 97 MG/DL (ref 0–100)
LDLC/HDLC SERPL: 2.19 {RATIO}
LYMPHOCYTES # BLD AUTO: 1.74 10*3/MM3 (ref 0.7–3.1)
LYMPHOCYTES NFR BLD AUTO: 24.8 % (ref 19.6–45.3)
MCH RBC QN AUTO: 31.1 PG (ref 26.6–33)
MCHC RBC AUTO-ENTMCNC: 32.2 G/DL (ref 31.5–35.7)
MCV RBC AUTO: 96.8 FL (ref 79–97)
MONOCYTES # BLD AUTO: 0.53 10*3/MM3 (ref 0.1–0.9)
MONOCYTES NFR BLD AUTO: 7.6 % (ref 5–12)
NEUTROPHILS # BLD AUTO: 4.61 10*3/MM3 (ref 1.7–7)
NEUTROPHILS NFR BLD AUTO: 65.7 % (ref 42.7–76)
NRBC BLD AUTO-RTO: 0 /100 WBC (ref 0–0.2)
PLATELET # BLD AUTO: 182 10*3/MM3 (ref 140–450)
POTASSIUM SERPL-SCNC: 4.4 MMOL/L (ref 3.5–5.2)
PROT SERPL-MCNC: 6.4 G/DL (ref 6–8.5)
RBC # BLD AUTO: 3.79 10*6/MM3 (ref 3.77–5.28)
SODIUM SERPL-SCNC: 142 MMOL/L (ref 136–145)
TRIGL SERPL-MCNC: 139 MG/DL (ref 0–150)
VLDLC SERPL CALC-MCNC: 25 MG/DL (ref 5–40)
WBC # BLD AUTO: 7.01 10*3/MM3 (ref 3.4–10.8)

## (undated) DEVICE — GW EMR FIX EXCHG J STD .035 3MM 260CM

## (undated) DEVICE — CANN NASL CO2 TRULINK W/O2 A/

## (undated) DEVICE — GLIDESHEATH BASIC HYDROPHILIC COATED INTRODUCER SHEATH: Brand: GLIDESHEATH

## (undated) DEVICE — CATH DIAG IMPULSE ARMOD 5F 100CM

## (undated) DEVICE — RUNTHROUGH NS EXTRA FLOPPY PTCA GUIDEWIRE: Brand: RUNTHROUGH

## (undated) DEVICE — KT MANIFLD CARDIAC

## (undated) DEVICE — TBG 02 CRUSH RESIST LF CLR 7FT

## (undated) DEVICE — TR BAND RADIAL ARTERY COMPRESSION DEVICE: Brand: TR BAND

## (undated) DEVICE — HI-TORQUE BALANCE MIDDLEWEIGHT UNIVERSAL II GUIDE WIRE STRAIGHT TIP PAK  190 CM: Brand: HI-TORQUE BALANCE MIDDLEWEIGHT UNIVERSAL II

## (undated) DEVICE — CATH VENT MIV RADL PIG ST TIP 5F 110CM

## (undated) DEVICE — Device: Brand: DEFENDO AIR/WATER/SUCTION AND BIOPSY VALVE

## (undated) DEVICE — TUBING, SUCTION, 1/4" X 10', STRAIGHT: Brand: MEDLINE

## (undated) DEVICE — PK CATH CARD 40

## (undated) DEVICE — BALN PRESS WEDGE 5F 110CM

## (undated) DEVICE — CATH DIAG IMPULSE FL3.5 5F 100CM

## (undated) DEVICE — FRCP BX RADJAW4 NDL 2.8 240CM LG OG BX40

## (undated) DEVICE — KT PK ANGIOPLASTY ACC 9 MERIT

## (undated) DEVICE — BITEBLOCK OMNI BLOC

## (undated) DEVICE — THE TORRENT IRRIGATION SCOPE CONNECTOR IS USED WITH THE TORRENT IRRIGATION TUBING TO PROVIDE IRRIGATION FLUIDS SUCH AS STERILE WATER DURING GASTROINTESTINAL ENDOSCOPIC PROCEDURES WHEN USED IN CONJUNCTION WITH AN IRRIGATION PUMP (OR ELECTROSURGICAL UNIT).: Brand: TORRENT

## (undated) DEVICE — CATH DIAG IMPULSE FR4 5F 100CM